# Patient Record
Sex: FEMALE | Race: WHITE | Employment: OTHER | URBAN - NONMETROPOLITAN AREA
[De-identification: names, ages, dates, MRNs, and addresses within clinical notes are randomized per-mention and may not be internally consistent; named-entity substitution may affect disease eponyms.]

---

## 2017-07-23 ENCOUNTER — HOSPITAL ENCOUNTER (EMERGENCY)
Age: 82
Discharge: HOME | End: 2017-07-23
Attending: EMERGENCY MEDICINE
Payer: MEDICARE

## 2017-07-23 VITALS
WEIGHT: 114.6 LBS | RESPIRATION RATE: 16 BRPM | OXYGEN SATURATION: 98 % | DIASTOLIC BLOOD PRESSURE: 64 MMHG | SYSTOLIC BLOOD PRESSURE: 135 MMHG | HEIGHT: 61 IN | TEMPERATURE: 98 F | HEART RATE: 84 BPM | BODY MASS INDEX: 21.64 KG/M2

## 2017-07-23 DIAGNOSIS — L50.9 URTICARIA: Primary | ICD-10-CM

## 2017-07-23 PROCEDURE — 99213 OFFICE O/P EST LOW 20 MIN: CPT | Performed by: EMERGENCY MEDICINE

## 2017-07-23 PROCEDURE — 99212 OFFICE O/P EST SF 10 MIN: CPT

## 2017-07-23 RX ORDER — METHYLPREDNISOLONE 4 MG/1
TABLET ORAL
Qty: 1 KIT | Refills: 0 | Status: SHIPPED | OUTPATIENT
Start: 2017-07-23 | End: 2017-07-29

## 2017-07-23 RX ORDER — HYDROXYZINE 50 MG/1
50 TABLET, FILM COATED ORAL 3 TIMES DAILY PRN
Qty: 15 TABLET | Refills: 0 | Status: SHIPPED | OUTPATIENT
Start: 2017-07-23 | End: 2017-08-02

## 2017-07-23 RX ORDER — ERGOCALCIFEROL 1.25 MG/1
50000 CAPSULE ORAL WEEKLY
COMMUNITY

## 2017-07-23 ASSESSMENT — ENCOUNTER SYMPTOMS
NAUSEA: 0
SORE THROAT: 0
ABDOMINAL PAIN: 0
WHEEZING: 0
EYE REDNESS: 0
COUGH: 0
EYE PAIN: 0
VOMITING: 0
DIARRHEA: 0
SHORTNESS OF BREATH: 0
EYE DISCHARGE: 0

## 2018-04-06 ENCOUNTER — HOSPITAL ENCOUNTER (EMERGENCY)
Age: 83
Discharge: HOME | End: 2018-04-06
Payer: MEDICARE

## 2018-04-06 VITALS
OXYGEN SATURATION: 96 % | DIASTOLIC BLOOD PRESSURE: 64 MMHG | SYSTOLIC BLOOD PRESSURE: 137 MMHG | BODY MASS INDEX: 21.73 KG/M2 | TEMPERATURE: 98.7 F | HEART RATE: 107 BPM | RESPIRATION RATE: 16 BRPM | WEIGHT: 115 LBS

## 2018-04-06 DIAGNOSIS — J04.0 LARYNGITIS ACUTE, SPASMODIC: ICD-10-CM

## 2018-04-06 DIAGNOSIS — H65.01 RIGHT ACUTE SEROUS OTITIS MEDIA, RECURRENCE NOT SPECIFIED: Primary | ICD-10-CM

## 2018-04-06 PROCEDURE — 99212 OFFICE O/P EST SF 10 MIN: CPT

## 2018-04-06 PROCEDURE — 99213 OFFICE O/P EST LOW 20 MIN: CPT | Performed by: NURSE PRACTITIONER

## 2018-04-06 RX ORDER — AZITHROMYCIN 250 MG/1
TABLET, FILM COATED ORAL
Qty: 6 TABLET | Refills: 0 | Status: SHIPPED | OUTPATIENT
Start: 2018-04-06 | End: 2020-11-24 | Stop reason: ALTCHOICE

## 2018-04-06 RX ORDER — METHYLPREDNISOLONE 4 MG/1
TABLET ORAL
Qty: 1 KIT | Refills: 0 | Status: SHIPPED | OUTPATIENT
Start: 2018-04-06 | End: 2018-04-12

## 2018-04-06 RX ORDER — AZELASTINE 1 MG/ML
1 SPRAY, METERED NASAL 2 TIMES DAILY
Qty: 1 BOTTLE | Refills: 0 | Status: SHIPPED | OUTPATIENT
Start: 2018-04-06 | End: 2020-11-24 | Stop reason: ALTCHOICE

## 2018-04-06 ASSESSMENT — ENCOUNTER SYMPTOMS
FLU SYMPTOMS: 1
SINUS PRESSURE: 1
SHORTNESS OF BREATH: 0
DIARRHEA: 0
CHOKING: 0
CHEST TIGHTNESS: 0
VOMITING: 0
COUGH: 1
SORE THROAT: 1
APNEA: 0
WHEEZING: 0
STRIDOR: 0
NAUSEA: 0
RHINORRHEA: 1

## 2020-11-24 ENCOUNTER — APPOINTMENT (OUTPATIENT)
Dept: GENERAL RADIOLOGY | Age: 85
End: 2020-11-24
Payer: MEDICARE

## 2020-11-24 ENCOUNTER — HOSPITAL ENCOUNTER (EMERGENCY)
Age: 85
Discharge: HOME | End: 2020-11-24
Payer: MEDICARE

## 2020-11-24 VITALS
OXYGEN SATURATION: 98 % | TEMPERATURE: 98 F | SYSTOLIC BLOOD PRESSURE: 110 MMHG | BODY MASS INDEX: 21.92 KG/M2 | HEART RATE: 89 BPM | DIASTOLIC BLOOD PRESSURE: 73 MMHG | RESPIRATION RATE: 16 BRPM | WEIGHT: 116 LBS

## 2020-11-24 LAB
ALBUMIN SERPL-MCNC: 4.5 G/DL (ref 3.5–5.1)
ALP BLD-CCNC: 54 U/L (ref 38–126)
ALT SERPL-CCNC: 13 U/L (ref 11–66)
ANION GAP SERPL CALCULATED.3IONS-SCNC: 10 MEQ/L (ref 8–16)
AST SERPL-CCNC: 23 U/L (ref 5–40)
BACTERIA: ABNORMAL
BASOPHILS # BLD: 1.3 %
BASOPHILS ABSOLUTE: 0.1 THOU/MM3 (ref 0–0.1)
BILIRUB SERPL-MCNC: 0.2 MG/DL (ref 0.3–1.2)
BILIRUBIN DIRECT: < 0.2 MG/DL (ref 0–0.3)
BILIRUBIN URINE: NEGATIVE
BLOOD, URINE: NEGATIVE
BUN BLDV-MCNC: 14 MG/DL (ref 7–22)
CALCIUM SERPL-MCNC: 10.6 MG/DL (ref 8.5–10.5)
CASTS: ABNORMAL /LPF
CASTS: ABNORMAL /LPF
CHARACTER, URINE: CLEAR
CHLORIDE BLD-SCNC: 103 MEQ/L (ref 98–111)
CO2: 25 MEQ/L (ref 23–33)
COLOR: YELLOW
CREAT SERPL-MCNC: 0.9 MG/DL (ref 0.4–1.2)
CRYSTALS: ABNORMAL
D-DIMER QUANTITATIVE: 351 NG/ML FEU (ref 0–500)
EOSINOPHIL # BLD: 2.4 %
EOSINOPHILS ABSOLUTE: 0.2 THOU/MM3 (ref 0–0.4)
EPITHELIAL CELLS, UA: ABNORMAL /HPF
ERYTHROCYTE [DISTWIDTH] IN BLOOD BY AUTOMATED COUNT: 13 % (ref 11.5–14.5)
ERYTHROCYTE [DISTWIDTH] IN BLOOD BY AUTOMATED COUNT: 46.5 FL (ref 35–45)
GFR SERPL CREATININE-BSD FRML MDRD: 59 ML/MIN/1.73M2
GLUCOSE BLD-MCNC: 103 MG/DL (ref 70–108)
GLUCOSE, URINE: NEGATIVE MG/DL
HCT VFR BLD CALC: 36 % (ref 37–47)
HEMOGLOBIN: 11.8 GM/DL (ref 12–16)
IMMATURE GRANS (ABS): 0.01 THOU/MM3 (ref 0–0.07)
IMMATURE GRANULOCYTES: 0.1 %
KETONES, URINE: NEGATIVE
LEUKOCYTE ESTERASE, URINE: ABNORMAL
LIPASE: 30.6 U/L (ref 5.6–51.3)
LYMPHOCYTES # BLD: 28.8 %
LYMPHOCYTES ABSOLUTE: 2 THOU/MM3 (ref 1–4.8)
MCH RBC QN AUTO: 31.6 PG (ref 26–33)
MCHC RBC AUTO-ENTMCNC: 32.8 GM/DL (ref 32.2–35.5)
MCV RBC AUTO: 96.5 FL (ref 81–99)
MISCELLANEOUS LAB TEST RESULT: ABNORMAL
MONOCYTES # BLD: 5.9 %
MONOCYTES ABSOLUTE: 0.4 THOU/MM3 (ref 0.4–1.3)
NITRITE, URINE: NEGATIVE
NUCLEATED RED BLOOD CELLS: 0 /100 WBC
OSMOLALITY CALCULATION: 276.4 MOSMOL/KG (ref 275–300)
PH UA: 7 (ref 5–9)
PLATELET # BLD: 154 THOU/MM3 (ref 130–400)
PMV BLD AUTO: 12.6 FL (ref 9.4–12.4)
POTASSIUM SERPL-SCNC: 4 MEQ/L (ref 3.5–5.2)
PRO-BNP: 3859 PG/ML (ref 0–1800)
PROTEIN UA: NEGATIVE MG/DL
RBC # BLD: 3.73 MILL/MM3 (ref 4.2–5.4)
RBC URINE: ABNORMAL /HPF
RENAL EPITHELIAL, UA: ABNORMAL
SARS-COV-2, NAAT: NOT DETECTED
SEG NEUTROPHILS: 61.5 %
SEGMENTED NEUTROPHILS ABSOLUTE COUNT: 4.4 THOU/MM3 (ref 1.8–7.7)
SODIUM BLD-SCNC: 138 MEQ/L (ref 135–145)
SPECIFIC GRAVITY UA: 1.01 (ref 1–1.03)
TOTAL PROTEIN: 6.5 G/DL (ref 6.1–8)
TROPONIN T: < 0.01 NG/ML
UROBILINOGEN, URINE: 0.2 EU/DL (ref 0–1)
WBC # BLD: 7.1 THOU/MM3 (ref 4.8–10.8)
WBC UA: ABNORMAL /HPF
YEAST: ABNORMAL

## 2020-11-24 PROCEDURE — 36415 COLL VENOUS BLD VENIPUNCTURE: CPT

## 2020-11-24 PROCEDURE — 93005 ELECTROCARDIOGRAM TRACING: CPT | Performed by: NURSE PRACTITIONER

## 2020-11-24 PROCEDURE — 83880 ASSAY OF NATRIURETIC PEPTIDE: CPT

## 2020-11-24 PROCEDURE — 81001 URINALYSIS AUTO W/SCOPE: CPT

## 2020-11-24 PROCEDURE — 82248 BILIRUBIN DIRECT: CPT

## 2020-11-24 PROCEDURE — 85025 COMPLETE CBC W/AUTO DIFF WBC: CPT

## 2020-11-24 PROCEDURE — 83690 ASSAY OF LIPASE: CPT

## 2020-11-24 PROCEDURE — 99213 OFFICE O/P EST LOW 20 MIN: CPT | Performed by: NURSE PRACTITIONER

## 2020-11-24 PROCEDURE — 80053 COMPREHEN METABOLIC PANEL: CPT

## 2020-11-24 PROCEDURE — 84484 ASSAY OF TROPONIN QUANT: CPT

## 2020-11-24 PROCEDURE — 99215 OFFICE O/P EST HI 40 MIN: CPT

## 2020-11-24 PROCEDURE — 99284 EMERGENCY DEPT VISIT MOD MDM: CPT

## 2020-11-24 PROCEDURE — U0002 COVID-19 LAB TEST NON-CDC: HCPCS

## 2020-11-24 PROCEDURE — 85379 FIBRIN DEGRADATION QUANT: CPT

## 2020-11-24 PROCEDURE — 71045 X-RAY EXAM CHEST 1 VIEW: CPT

## 2020-11-24 PROCEDURE — 6370000000 HC RX 637 (ALT 250 FOR IP): Performed by: NURSE PRACTITIONER

## 2020-11-24 RX ORDER — POTASSIUM CHLORIDE 20 MEQ/1
20 TABLET, EXTENDED RELEASE ORAL DAILY
Qty: 14 TABLET | Refills: 0 | Status: ON HOLD | OUTPATIENT
Start: 2020-11-24 | End: 2020-12-18 | Stop reason: SDUPTHER

## 2020-11-24 RX ORDER — ASPIRIN 81 MG/1
243 TABLET, CHEWABLE ORAL ONCE
Status: COMPLETED | OUTPATIENT
Start: 2020-11-24 | End: 2020-11-24

## 2020-11-24 RX ORDER — FUROSEMIDE 20 MG/1
20 TABLET ORAL DAILY
Qty: 14 TABLET | Refills: 0 | Status: ON HOLD | OUTPATIENT
Start: 2020-11-24 | End: 2020-12-18 | Stop reason: SDUPTHER

## 2020-11-24 RX ADMIN — ASPIRIN 243 MG: 81 TABLET, CHEWABLE ORAL at 12:44

## 2020-11-24 ASSESSMENT — ENCOUNTER SYMPTOMS
SHORTNESS OF BREATH: 1
RHINORRHEA: 0
BACK PAIN: 0
ABDOMINAL PAIN: 1
COUGH: 0
VOMITING: 0
DIARRHEA: 0
SINUS PRESSURE: 0
CONSTIPATION: 0
CHEST TIGHTNESS: 1
NAUSEA: 0
SORE THROAT: 0

## 2020-11-24 NOTE — PROGRESS NOTES
Patient released in stable condition. Instructed to go directly to [facility] emergency department for further evaluation and treatment. Patient verbalized understanding. Ambulated, per self, to private car.

## 2020-11-24 NOTE — ED PROVIDER NOTES
[facility]  Urgent Care Encounter       CHIEF COMPLAINT       Chief Complaint   Patient presents with    Shortness of Breath     dizziness    Chest Pain       Nurses Notes reviewed and I agree except as noted in the HPI. HISTORY OF PRESENT ILLNESS   Denny Osorio is a 80 y.o. female who presents for evaluation of chest tightness and shortness of breath. Patient states this is an ongoing issue for the past week. States that the symptoms seem to be worse with exertion. States that she can typically walk without difficulty but now feels somewhat short of breath and her chest will get tight after a mildly extended period of walking which is not normal for her. States that she has also noticed some swelling in the left lower extremity. Patient reports a history of hypertension and high cholesterol for which she is currently taking medications. States that she has not had a routine cardiac work-ups patient denies any cough, congestion, sore throat or any respiratory or evaluations. Does report a history of a heart murmur. Patient denies any illnesses or any known sick exposures. The history is provided by the patient. REVIEW OF SYSTEMS     Review of Systems   Constitutional: Negative for chills and fever. HENT: Negative for congestion and sore throat. Respiratory: Positive for chest tightness and shortness of breath. Negative for cough. Cardiovascular: Positive for chest pain and leg swelling. Gastrointestinal: Negative for nausea and vomiting. Musculoskeletal: Negative for arthralgias and myalgias. Skin: Negative for rash. Allergic/Immunologic: Negative for environmental allergies. Neurological: Positive for dizziness. Negative for headaches.        PAST MEDICAL HISTORY         Diagnosis Date    Hyperlipidemia     Hypertension     Osteoporosis     Pneumonia        SURGICALHISTORY     Patient  has a past surgical history that includes Elbow surgery; Tonsillectomy; and Carpal tunnel release (Right). CURRENT MEDICATIONS       Previous Medications    AMLODIPINE-BENAZEPRIL (LOTREL) 5-10 MG PER CAPSULE    Take 1 capsule by mouth daily. DICYCLOMINE (BENTYL) 10 MG CAPSULE    Take 1 capsule by mouth 4 times daily (before meals and nightly). LORATADINE (CLARITIN) 10 MG TABLET    Take 10 mg by mouth daily. LOVASTATIN (MEVACOR) 10 MG TABLET    Take 1 tablet by mouth nightly. MULTIPLE VITAMINS-MINERALS (MULTI VITAMIN/MINERALS) TABS    Take  by mouth. OMEGA-3 FATTY ACIDS (FISH OIL OMEGA-3 PO)    Take by mouth    PIROXICAM (FELDENE) 20 MG CAPSULE    Take 1 capsule by mouth daily for 90 days. UNABLE TO FIND    Indications: Osteoporosis    VITAMIN D (ERGOCALCIFEROL) 42571 UNITS CAPS CAPSULE    Take 50,000 Units by mouth once a week       ALLERGIES     Patient is is allergic to tomato. Patients   There is no immunization history on file for this patient. FAMILY HISTORY     Patient's family history is not on file. SOCIAL HISTORY     Patient  reports that she has never smoked. She has never used smokeless tobacco. She reports that she does not drink alcohol or use drugs. PHYSICAL EXAM     ED TRIAGE VITALS  BP: 122/66, Temp: 97.7 °F (36.5 °C), Pulse: 97, Resp: 18, SpO2: 98 %,Estimated body mass index is 21.92 kg/m² as calculated from the following:    Height as of 7/23/17: 5' 1\" (1.549 m). Weight as of this encounter: 116 lb (52.6 kg). ,No LMP recorded. Patient is postmenopausal.    Physical Exam  Vitals signs and nursing note reviewed. Constitutional:       General: She is not in acute distress. Appearance: She is well-developed. She is not diaphoretic. Eyes:      Conjunctiva/sclera:      Right eye: Right conjunctiva is not injected. Left eye: Left conjunctiva is not injected. Pupils: Pupils are equal.   Neck:      Musculoskeletal: Normal range of motion.    Cardiovascular:      Rate and Rhythm: Normal rate and regular rhythm. Heart sounds: Murmur present. Pulmonary:      Effort: Pulmonary effort is normal. No respiratory distress. Breath sounds: Normal breath sounds. Musculoskeletal:      Right knee: She exhibits normal range of motion. Left knee: She exhibits normal range of motion. Right lower leg: No edema. Left lower leg: No edema. Skin:     General: Skin is warm. Findings: No rash. Neurological:      Mental Status: She is alert and oriented to person, place, and time. Psychiatric:         Behavior: Behavior normal.         DIAGNOSTIC RESULTS     Labs:  Results for orders placed or performed during the hospital encounter of 11/24/20   EKG 12 Lead   Result Value Ref Range    Ventricular Rate 97 BPM    Atrial Rate 97 BPM    P-R Interval 164 ms    QRS Duration 78 ms    Q-T Interval 310 ms    QTc Calculation (Bazett) 393 ms    P Axis 58 degrees    R Axis 24 degrees    T Axis 65 degrees       IMAGING:    No orders to display         EKG:  Sinus rhythm with subtle changes noted from previous EKG performed on 1/17/2013    URGENT CARE COURSE:     Vitals:    11/24/20 1219   BP: 122/66   Pulse: 97   Resp: 18   Temp: 97.7 °F (36.5 °C)   TempSrc: Temporal   SpO2: 98%   Weight: 116 lb (52.6 kg)       Medications   aspirin chewable tablet 243 mg (has no administration in time range)            PROCEDURES:  None    FINAL IMPRESSION      1. Dyspnea on exertion    2. Chest pain, unspecified type          DISPOSITION/ PLAN       I discussed with the patient that although the symptoms have been ongoing intermittently for the past week, the fact that they seem to occur with exertion is concerning and I advised that she have a cardiac work-up performed in the emergency department. She is agreeable to plan as discussed and states that she would prefer to go to [facility]. Report was called to HELEN Pierre RN.     PATIENT REFERRED TO:  Celena Tan MD  [address] / [address] [facility]

## 2020-11-24 NOTE — ED PROVIDER NOTES
[facility]       Chief Complaint   Patient presents with    Shortness of Breath     dizziness    Chest Pain     for about a week       Nurses Notes reviewed and I agree except as noted in the HPI. HISTORY OF PRESENT ILLNESS    Diego Camarillo is a 80 y.o. female with a history of hypertension, dyslipidemia, and heart murmur who presents to the ED for evaluation of chest pain and dizziness. Patient states she first noticed the chest pain when she was outside getting the mail about a week ago. She describes it as a substernal \"pressure\" that is non-radiating and lasted for a few seconds before subsiding. She states this chest pressure would occur intermittently throughout the week with exertion. She states she has not had a pain like this before in the past. She also notes SOB with exertion and dizziness. Patient also states she has bilateral lower extremity edema but denies leg pain. She denies palpitations, diaphoresis, cough, PND, orthopnea, pain with inspiration, recent illness, bowel changes, urinary changes, nausea, vomiting, fever, or chills. Patient was seen at urgent care and was referred to the ED. HPI was provided by the patient. REVIEW OF SYSTEMS     Review of Systems   Constitutional: Negative for chills, diaphoresis and fever. HENT: Negative for congestion, ear pain, hearing loss, rhinorrhea, sinus pressure, sore throat and tinnitus. Eyes: Negative for visual disturbance. Respiratory: Positive for shortness of breath (with exertion). Negative for cough. Cardiovascular: Positive for chest pain (substernal pressure) and leg swelling (bilateral ). Negative for palpitations. Gastrointestinal: Positive for abdominal pain (RLQ). Negative for constipation, diarrhea, nausea and vomiting. Genitourinary: Negative for dysuria and frequency. Musculoskeletal: Negative for back pain and neck pain. Neurological: Positive for dizziness.  Negative for syncope, weakness and headaches. PAST MEDICAL HISTORY     Past Medical History:   Diagnosis Date    Hyperlipidemia     Hypertension     Osteoporosis     Pneumonia        SURGICALHISTORY      has a past surgical history that includes Elbow surgery; Tonsillectomy; and Carpal tunnel release (Right). CURRENT MEDICATIONS       Previous Medications    AMLODIPINE-BENAZEPRIL (LOTREL) 5-10 MG PER CAPSULE    Take 1 capsule by mouth daily. DICYCLOMINE (BENTYL) 10 MG CAPSULE    Take 1 capsule by mouth 4 times daily (before meals and nightly). LORATADINE (CLARITIN) 10 MG TABLET    Take 10 mg by mouth daily. LOVASTATIN (MEVACOR) 10 MG TABLET    Take 1 tablet by mouth nightly. MULTIPLE VITAMINS-MINERALS (MULTI VITAMIN/MINERALS) TABS    Take  by mouth. OMEGA-3 FATTY ACIDS (FISH OIL OMEGA-3 PO)    Take by mouth    PIROXICAM (FELDENE) 20 MG CAPSULE    Take 1 capsule by mouth daily for 90 days. UNABLE TO FIND    Indications: Osteoporosis    VITAMIN D (ERGOCALCIFEROL) 68246 UNITS CAPS CAPSULE    Take 50,000 Units by mouth once a week       ALLERGIES     is allergic to tomato. FAMILY HISTORY     She indicated that her mother is . She indicated that her father is . family history is not on file. SOCIAL HISTORY       Social History     Socioeconomic History    Marital status:       Spouse name: Not on file    Number of children: Not on file    Years of education: Not on file    Highest education level: Not on file   Occupational History    Not on file   Social Needs    Financial resource strain: Not on file    Food insecurity     Worry: Not on file     Inability: Not on file    Transportation needs     Medical: Not on file     Non-medical: Not on file   Tobacco Use    Smoking status: Never Smoker    Smokeless tobacco: Never Used   Substance and Sexual Activity    Alcohol use: No     Comment: rare    Drug use: No    Sexual activity: Not on file   Lifestyle  Physical activity     Days per week: Not on file     Minutes per session: Not on file    Stress: Not on file   Relationships    Social connections     Talks on phone: Not on file     Gets together: Not on file     Attends Protestant service: Not on file     Active member of club or organization: Not on file     Attends meetings of clubs or organizations: Not on file     Relationship status: Not on file    Intimate partner violence     Fear of current or ex partner: Not on file     Emotionally abused: Not on file     Physically abused: Not on file     Forced sexual activity: Not on file   Other Topics Concern    Not on file   Social History Narrative    Not on file       PHYSICAL EXAM     INITIAL VITALS:  weight is 116 lb (52.6 kg). Her oral temperature is 98 °F (36.7 °C). Her blood pressure is 134/55 (abnormal) and her pulse is 88. Her respiration is 18 and oxygen saturation is 95%. Physical Exam  Constitutional:       General: She is not in acute distress. Appearance: She is well-developed and normal weight. She is not ill-appearing or diaphoretic. Cardiovascular:      Rate and Rhythm: Normal rate and regular rhythm. Pulses: Normal pulses. Heart sounds: Murmur (blowing murmur located in the aortic region that radiates to carotids ) present. Pulmonary:      Effort: Pulmonary effort is normal. No respiratory distress. Breath sounds: Examination of the right-lower field reveals rales. Examination of the left-lower field reveals rales. Rales present. No decreased breath sounds or wheezing. Chest:      Chest wall: No tenderness. Abdominal:      General: Bowel sounds are normal.      Palpations: Abdomen is soft. There is no mass. Tenderness: There is no abdominal tenderness. Musculoskeletal: Normal range of motion. Right lower leg: She exhibits tenderness. Edema present. Left lower leg: She exhibits tenderness. Edema present.       Comments: Mild non-pitting edema L>R Skin:     General: Skin is warm and dry. Capillary Refill: Capillary refill takes less than 2 seconds. Coloration: Skin is not pale. Findings: No erythema. Comments: Varicose veins located bilateral lower extremities    Neurological:      General: No focal deficit present. Mental Status: She is alert. Motor: No weakness. DIFFERENTIAL DIAGNOSIS:   Unstable angina, CHF, PE, aortic stenosis, PNA    DIAGNOSTIC RESULTS     EKG: All EKG's are interpreted by the Emergency Department Physician who eithersigns or Co-signs this chart in the absence of a cardiologist.    EKG read and interpreted by myself with comparison to January 17, 2013 gives impression of normal sinus rhythm with heart rate of 97; interval 164; QRS 78;QTc 393; axis P-58, R-24, T-65. RADIOLOGY: non-plainfilm images(s) such as CT, Ultrasound and MRI are read by the radiologist.  Plain radiographic images are visualized and preliminarily interpreted by the emergency physician unless otherwise stated below. XR CHEST PORTABLE    (Results Pending)         LABS:   Labs Reviewed   CBC WITH AUTO DIFFERENTIAL   BASIC METABOLIC PANEL   HEPATIC FUNCTION PANEL   LIPASE   TROPONIN   BRAIN NATRIURETIC PEPTIDE   D-DIMER, QUANTITATIVE   COVID-19   URINALYSIS WITH MICROSCOPIC   COVID-19       EMERGENCY DEPARTMENT COURSE:   Vitals:    Vitals:    11/24/20 1219 11/24/20 1337   BP: 122/66 (!) 134/55   Pulse: 97 88   Resp: 18 18   Temp: 97.7 °F (36.5 °C) 98 °F (36.7 °C)   TempSrc: Temporal Oral   SpO2: 98% 95%   Weight: 116 lb (52.6 kg)        MDM    Patient was seen and evaluated in the emergency department, patient appeared to be in no acute distress, vital signs were reviewed, no significant findings were noted. Physical exam was completed, some minimal edema in the lower extremities, she had a systolic murmur, consistent with aortic stenosis.   She denies any history of echocardiogram in the past.  She denies history of cardiologist.  She denies chest pain for me. She has no significant medical histories. Labs were obtained, slight elevation in proBNP noted. Chest x-ray reveals no significant findings. Patient seems to have some congestive heart failure may be associated with her heart murmur. She is stable enough for discharge, will place her on furosemide daily, will add potassium for expected hypokalemia. Discussed the case with the on-call cardiologist Dr. Garry Maddox, he agrees to follow-up with the patient as an outpatient. Discussed this with the patient and her daughter and they are amenable with discharge. They are advised to return the emergency department new or worsening signs or symptoms. Here in the emergency department patient maintained stable course and appropriate for discharge. Medications   aspirin chewable tablet 243 mg (243 mg Oral Given 11/24/20 1244)       Patient was seenindependently by myself. The patient's final impression and disposition and plan was determined by myself. CRITICAL CARE:   None    CONSULTS:  Dr. Tami Gary:  None    FINAL IMPRESSION     1. Dyspnea on exertion    2. Chest pain, unspecified type          DISPOSITION/PLAN   Patient was discharged in stable condition    PATIENT REFERREDTO:  [facility] [address] [address] EMERGENCY DEPT  [address]  Go today        DISCHARGE MEDICATIONS:  New Prescriptions    No medications on file       (Please note that portions of this note were completed with a voice recognition program.  Efforts were made to edit the dictations but occasionally words are mis-transcribed.)      Provider:  I personally performed the services described in the documentation,reviewed and edited the documentation which was dictated to the scribe in my presence, and it accurately records my words and actions.     Samir Pope CNP 11/24/20 2:29 PM    Allegra Pope, DEBBIE - CNP       [place], APRN - CNP  11/24/20

## 2020-11-24 NOTE — ED TRIAGE NOTES
Patient to room from registration. C/o intermittent shortness of breath, chest tightness, and dizziness beginning one week ago. EKG completed. Provider notified.

## 2020-11-25 ENCOUNTER — TELEPHONE (OUTPATIENT)
Dept: CARDIOLOGY CLINIC | Age: 85
End: 2020-11-25

## 2020-11-25 LAB
EKG ATRIAL RATE: 83 BPM
EKG ATRIAL RATE: 97 BPM
EKG P AXIS: 58 DEGREES
EKG P AXIS: 59 DEGREES
EKG P-R INTERVAL: 162 MS
EKG P-R INTERVAL: 164 MS
EKG Q-T INTERVAL: 310 MS
EKG Q-T INTERVAL: 362 MS
EKG QRS DURATION: 78 MS
EKG QRS DURATION: 78 MS
EKG QTC CALCULATION (BAZETT): 393 MS
EKG QTC CALCULATION (BAZETT): 425 MS
EKG R AXIS: 14 DEGREES
EKG R AXIS: 24 DEGREES
EKG T AXIS: 44 DEGREES
EKG T AXIS: 65 DEGREES
EKG VENTRICULAR RATE: 83 BPM
EKG VENTRICULAR RATE: 97 BPM

## 2020-11-25 PROCEDURE — 93010 ELECTROCARDIOGRAM REPORT: CPT | Performed by: INTERNAL MEDICINE

## 2020-11-30 ENCOUNTER — OFFICE VISIT (OUTPATIENT)
Dept: CARDIOLOGY CLINIC | Age: 85
End: 2020-11-30
Payer: MEDICARE

## 2020-11-30 VITALS
HEIGHT: 62 IN | WEIGHT: 119.4 LBS | SYSTOLIC BLOOD PRESSURE: 138 MMHG | BODY MASS INDEX: 21.97 KG/M2 | DIASTOLIC BLOOD PRESSURE: 80 MMHG | HEART RATE: 66 BPM

## 2020-11-30 PROBLEM — R07.9 CHEST PAIN ON EXERTION: Status: ACTIVE | Noted: 2020-11-30

## 2020-11-30 PROBLEM — R01.1 SYSTOLIC EJECTION MURMUR: Status: ACTIVE | Noted: 2020-11-30

## 2020-11-30 PROBLEM — R01.1 PANSYSTOLIC MURMUR: Status: ACTIVE | Noted: 2020-11-30

## 2020-11-30 PROBLEM — I50.9 CHRONIC CONGESTIVE HEART FAILURE (HCC): Status: ACTIVE | Noted: 2020-11-30

## 2020-11-30 PROBLEM — R06.02 SOB (SHORTNESS OF BREATH) ON EXERTION: Status: ACTIVE | Noted: 2020-11-30

## 2020-11-30 PROCEDURE — 99204 OFFICE O/P NEW MOD 45 MIN: CPT | Performed by: INTERNAL MEDICINE

## 2020-11-30 RX ORDER — AMLODIPINE BESYLATE 5 MG/1
TABLET ORAL
Status: ON HOLD | COMMUNITY
Start: 2020-10-01 | End: 2020-12-30 | Stop reason: HOSPADM

## 2020-11-30 NOTE — PROGRESS NOTES
Chief Complaint   Patient presents with    New Patient    Chest Pain    Shortness of Breath    New patient for SOB and chest pain    Seen in ED and sent home with lasix  And kcl    Leg edema got better after lasix    Chest Pain   Patient complains of chest pain. For the last 2 weeks . Retrosternal, sudden in onset, Symptoms have been unchanged since that time. The patient's pain is intermittent. The patient describes the pain as pressure and does not radiate. Patient rates pain as a 5/10 in intensity. Associated symptoms are: dyspnea. Aggravating factors are: exercise and walking. Alleviating factors are: rest.   CP last 5 min    Sob on exertion olast 2 weeks    She started to walk less    Walking to mail box and return give her cp and sob    Normally active and doing yard work    Dizziness on getting up    Never smoked    Known to have murmur for yrs    FHX  None for CAD      Patient Active Problem List   Diagnosis    HTN (hypertension)    Hyperlipidemia    Osteoporosis    Allergic rhinitis    IBS (irritable bowel syndrome)    Osteoarthrosis involving multiple sites    Medication monitoring encounter    Postmenopausal bone loss    Anemia    Right hand weakness, atrophy of thumb muscle.  Chest pressure  on exertion    SOB (shortness of breath) on exertion    Chronic congestive heart failure (HCC)    Systolic ejection murmur 5/6 best aortic area    Pansystolic murmur- best tricuspid area 5/6       Past Surgical History:   Procedure Laterality Date    CARPAL TUNNEL RELEASE Right     ELBOW SURGERY      2013 right elbow    TONSILLECTOMY         Allergies   Allergen Reactions    Tomato Hives        History reviewed. No pertinent family history. Social History     Socioeconomic History    Marital status:       Spouse name: Not on file    Number of children: Not on file    Years of education: Not on file    Highest education level: Not on file   Occupational History    Not on file Social Needs    Financial resource strain: Not on file    Food insecurity     Worry: Not on file     Inability: Not on file    Transportation needs     Medical: Not on file     Non-medical: Not on file   Tobacco Use    Smoking status: Never Smoker    Smokeless tobacco: Never Used   Substance and Sexual Activity    Alcohol use: No     Comment: rare    Drug use: No    Sexual activity: Not on file   Lifestyle    Physical activity     Days per week: Not on file     Minutes per session: Not on file    Stress: Not on file   Relationships    Social connections     Talks on phone: Not on file     Gets together: Not on file     Attends Mandaen service: Not on file     Active member of club or organization: Not on file     Attends meetings of clubs or organizations: Not on file     Relationship status: Not on file    Intimate partner violence     Fear of current or ex partner: Not on file     Emotionally abused: Not on file     Physically abused: Not on file     Forced sexual activity: Not on file   Other Topics Concern    Not on file   Social History Narrative    Not on file       Current Outpatient Medications   Medication Sig Dispense Refill    amLODIPine (NORVASC) 5 MG tablet       furosemide (LASIX) 20 MG tablet Take 1 tablet by mouth daily 14 tablet 0    potassium chloride (KLOR-CON M) 20 MEQ extended release tablet Take 1 tablet by mouth daily 14 tablet 0    UNABLE TO FIND Indications: Osteoporosis      Omega-3 Fatty Acids (FISH OIL OMEGA-3 PO) Take by mouth      vitamin D (ERGOCALCIFEROL) 84289 units CAPS capsule Take 50,000 Units by mouth once a week      Multiple Vitamins-Minerals (MULTI VITAMIN/MINERALS) TABS Take  by mouth.  lovastatin (MEVACOR) 10 MG tablet Take 1 tablet by mouth nightly. 90 tablet 3    dicyclomine (BENTYL) 10 MG capsule Take 1 capsule by mouth 4 times daily (before meals and nightly). 360 capsule 3    loratadine (CLARITIN) 10 MG tablet Take 10 mg by mouth daily.  piroxicam (FELDENE) 20 MG capsule Take 1 capsule by mouth daily for 90 days. 30 capsule 3     No current facility-administered medications for this visit. Review of Systems -     General ROS: negative  Psychological ROS: negative  Hematological and Lymphatic ROS: No history of blood clots or bleeding disorder. Respiratory ROS: no cough,  or wheezing, the rest see HPI  Cardiovascular ROS: See HPI  Gastrointestinal ROS: negative  Genito-Urinary ROS: no dysuria, trouble voiding, or hematuria  Musculoskeletal ROS: negative  Neurological ROS: no TIA or stroke symptoms  Dermatological ROS: negative      Blood pressure 138/80, pulse 66, height 5' 2\" (1.575 m), weight 119 lb 6.4 oz (54.2 kg). Physical Examination:    General appearance - alert, well appearing, and in no distress  HEENT- Pink conjunctiva  , Non-icteri sclera,PERRLA  Mental status - alert, oriented to person, place, and time  Neck - supple, no significant adenopathy, no JVD, or carotid bruits  Chest - clear to auscultation, no wheezes, rales or rhonchi, symmetric air entry  Heart - normal rate, regular rhythm, normal S1, S2, no murmurs, rubs, clicks or gallops  Abdomen - soft, nontender, nondistended, no masses or organomegaly  HODAN- no CVA or flank tenderness, no suprapubic tenderness  Neurological - alert, oriented, normal speech, no focal findings or movement disorder noted  Musculoskeletal/limbs - no joint tenderness, deformity or swelling   - peripheral pulses normal, no pedal edema, no clubbing or cyanosis  Skin - normal coloration and turgor, no rashes, no suspicious skin lesions noted  Psych- appropriate mood and affect    Lab  No results for input(s): CKTOTAL, CKMB, CKMBINDEX, TROPONINI in the last 72 hours.   CBC:   Lab Results   Component Value Date    WBC 7.1 11/24/2020    RBC 3.73 11/24/2020    HGB 11.8 11/24/2020    HCT 36.0 11/24/2020    MCV 96.5 11/24/2020    MCH 31.6 11/24/2020    MCHC 32.8 11/24/2020    RDW 13.0 symptoms got worse and/or develop new symptoms. The ED record reviewed    Congestive heart failure: no evidence of fluid overload today, no recent hospitalization for CHF  Cont lasix and kcl  BMP and mg  Leg edema resolved    significant murmurs  Echo    Cp and sob on exertion  Echo  lexisc nuc after echo      Hypertension, on medical treatment. Seems to be under good control. Patient is compliant with medical treatment. Hyperlipidemia: on statins, followed periodically. Patient need periodic lipid and liver profile.      BMP and MG    D/w the pat the plan of care    RTC in 2 weeks        [facility]

## 2020-12-02 ENCOUNTER — HOSPITAL ENCOUNTER (OUTPATIENT)
Dept: NON INVASIVE DIAGNOSTICS | Age: 85
Discharge: HOME | End: 2020-12-02
Payer: MEDICARE

## 2020-12-02 LAB
LV EF: 43 %
LVEF MODALITY: NORMAL

## 2020-12-02 PROCEDURE — 93306 TTE W/DOPPLER COMPLETE: CPT

## 2020-12-08 ENCOUNTER — TELEPHONE (OUTPATIENT)
Dept: CARDIOLOGY CLINIC | Age: 85
End: 2020-12-08

## 2020-12-08 NOTE — TELEPHONE ENCOUNTER
Echo completed. Does pt need stress test?    Summary   Left ventricle size is normal.   Mildly increased left ventricle wall thickness. Mild concentric left ventricular hypertrophy. There was moderate global hypokinesis of the left ventricle. Systolic function was moderately reduced. Ejection fraction is visually estimated in the range of 40% to 45%. Doppler parameters were consistent with abnormal left ventricular   relaxation (grade 1 diastolic dysfunction). The left atrium is Mildly dilated. There is severe/critical/ aortic stenosis with valve area of 0.5 to 0.6 sq   cm. The maximum aortic valve gradient is 125 mmHg, the mean gradient is 84   mmHg, and the peak velocity is 5.6 m/s.

## 2020-12-10 NOTE — PATIENT INSTRUCTIONS
You may receive a survey regarding the care you received during your visit. Your input is valuable to us. We encourage you to complete and return your survey. We hope you will choose us in the future for your healthcare needs.
Yes

## 2020-12-11 ENCOUNTER — OFFICE VISIT (OUTPATIENT)
Dept: CARDIOLOGY CLINIC | Age: 85
End: 2020-12-11
Payer: MEDICARE

## 2020-12-11 ENCOUNTER — HOSPITAL ENCOUNTER (OUTPATIENT)
Age: 85
Discharge: HOME | End: 2020-12-11
Payer: MEDICARE

## 2020-12-11 VITALS
BODY MASS INDEX: 22.38 KG/M2 | DIASTOLIC BLOOD PRESSURE: 64 MMHG | SYSTOLIC BLOOD PRESSURE: 123 MMHG | HEIGHT: 62 IN | WEIGHT: 121.6 LBS | HEART RATE: 94 BPM

## 2020-12-11 PROBLEM — I42.9 CARDIOMYOPATHY (HCC): Status: ACTIVE | Noted: 2020-12-11

## 2020-12-11 PROBLEM — I35.0 SEVERE CALCIFIC AORTIC STENOSIS: Status: ACTIVE | Noted: 2020-12-11

## 2020-12-11 PROCEDURE — U0003 INFECTIOUS AGENT DETECTION BY NUCLEIC ACID (DNA OR RNA); SEVERE ACUTE RESPIRATORY SYNDROME CORONAVIRUS 2 (SARS-COV-2) (CORONAVIRUS DISEASE [COVID-19]), AMPLIFIED PROBE TECHNIQUE, MAKING USE OF HIGH THROUGHPUT TECHNOLOGIES AS DESCRIBED BY CMS-2020-01-R: HCPCS

## 2020-12-11 PROCEDURE — 99215 OFFICE O/P EST HI 40 MIN: CPT | Performed by: INTERNAL MEDICINE

## 2020-12-11 NOTE — PROGRESS NOTES
Chief Complaint   Patient presents with    Follow-up     Echo fu     Chest Pain        Pt here to go over the echo. Seen in ED and sent home with lasix  And kcl 3 week back  With referal to cardiology    Complains of palpitation intermittent  On exertion    Sob and chest pressure on exertion in the last 4 weeks  Feel fatigue  Kline Median was active prior     Leg edema got better after lasix    Chest Pain   Patient complains of chest pain. For the last 4 weeks . Retrosternal, sudden in onset, Symptoms have been unchanged since that time. The patient's pain is intermittent. The patient describes the pain as pressure and does not radiate. Patient rates pain as a 5/10 in intensity. Associated symptoms are: dyspnea. Aggravating factors are: exercise and walking. Alleviating factors are: rest.   CP last 5 min    Sob on exertion 4  weeks    She started to walk less    Walking to mail box and return give her cp and sob    Normally active and doing yard work    Dizziness on getting up in the last 4 weeks    Never smoked    Known to have murmur for yrs    [address] [facility]  None for CAD      Patient Active Problem List   Diagnosis    HTN (hypertension)    Hyperlipidemia    Osteoporosis    Allergic rhinitis    IBS (irritable bowel syndrome)    Osteoarthrosis involving multiple sites    Medication monitoring encounter    Postmenopausal bone loss    Anemia    Right hand weakness, atrophy of thumb muscle.  Chest pressure  on exertion    SOB (shortness of breath) on exertion    Chronic congestive heart failure (HCC)    Systolic ejection murmur 5/6 best aortic area    Pansystolic murmur- best tricuspid area 5/6    Severe calcific aortic stenosis    Cardiomyopathy [facility])       Past Surgical History:   Procedure Laterality Date    CARPAL TUNNEL RELEASE Right     ELBOW SURGERY      2013 right elbow    TONSILLECTOMY         Allergies   Allergen Reactions    Tomato Hives        History reviewed. No pertinent family history. Social History     Socioeconomic History    Marital status:      Spouse name: Not on file    Number of children: Not on file    Years of education: Not on file    Highest education level: Not on file   Occupational History    Not on file   Social Needs    Financial resource strain: Not on file    Food insecurity     Worry: Not on file     Inability: Not on file    Transportation needs     Medical: Not on file     Non-medical: Not on file   Tobacco Use    Smoking status: Never Smoker    Smokeless tobacco: Never Used   Substance and Sexual Activity    Alcohol use: No     Comment: rare    Drug use: No    Sexual activity: Not on file   Lifestyle    Physical activity     Days per week: Not on file     Minutes per session: Not on file    Stress: Not on file   Relationships    Social connections     Talks on phone: Not on file     Gets together: Not on file     Attends Zoroastrianism service: Not on file     Active member of club or organization: Not on file     Attends meetings of clubs or organizations: Not on file     Relationship status: Not on file    Intimate partner violence     Fear of current or ex partner: Not on file     Emotionally abused: Not on file     Physically abused: Not on file     Forced sexual activity: Not on file   Other Topics Concern    Not on file   Social History Narrative    Not on file       Current Outpatient Medications   Medication Sig Dispense Refill    amLODIPine (NORVASC) 5 MG tablet       furosemide (LASIX) 20 MG tablet Take 1 tablet by mouth daily 14 tablet 0    potassium chloride (KLOR-CON M) 20 MEQ extended release tablet Take 1 tablet by mouth daily 14 tablet 0    UNABLE TO FIND Indications: Osteoporosis      Omega-3 Fatty Acids (FISH OIL OMEGA-3 PO) Take by mouth      vitamin D (ERGOCALCIFEROL) 90534 units CAPS capsule Take 50,000 Units by mouth once a week      Multiple Vitamins-Minerals (MULTI VITAMIN/MINERALS) TABS Take  by mouth.         lovastatin (MEVACOR) 10 MG tablet Take 1 tablet by mouth nightly. 90 tablet 3    dicyclomine (BENTYL) 10 MG capsule Take 1 capsule by mouth 4 times daily (before meals and nightly). 360 capsule 3    loratadine (CLARITIN) 10 MG tablet Take 10 mg by mouth daily.  piroxicam (FELDENE) 20 MG capsule Take 1 capsule by mouth daily for 90 days. 30 capsule 3     No current facility-administered medications for this visit. Review of Systems -     General ROS: negative  Psychological ROS: negative  Hematological and Lymphatic ROS: No history of blood clots or bleeding disorder. Respiratory ROS: no cough,  or wheezing, the rest see HPI  Cardiovascular ROS: See HPI  Gastrointestinal ROS: negative  Genito-Urinary ROS: no dysuria, trouble voiding, or hematuria  Musculoskeletal ROS: negative  Neurological ROS: no TIA or stroke symptoms  Dermatological ROS: negative      Blood pressure 123/64, pulse 116, height 5' 2\" (1.575 m), weight 121 lb 9.6 oz (55.2 kg).         Physical Examination:    General appearance - alert, well appearing, and in no distress  HEENT- Pink conjunctiva  , Non-icteri sclera,PERRLA  Mental status - alert, oriented to person, place, and time  Neck - supple, no significant adenopathy, no JVD, or carotid bruits  Chest - clear to auscultation, no wheezes, rales or rhonchi, symmetric air entry  Heart - normal rate, regular rhythm, normal S1, S2, no murmurs, rubs, clicks or gallops  Abdomen - soft, nontender, nondistended, no masses or organomegaly  HODAN- no CVA or flank tenderness, no suprapubic tenderness  Neurological - alert, oriented, normal speech, no focal findings or movement disorder noted  Musculoskeletal/limbs - no joint tenderness, deformity or swelling   - peripheral pulses normal, no pedal edema, no clubbing or cyanosis  Skin - normal coloration and turgor, no rashes, no suspicious skin lesions noted  Psych- appropriate mood and affect    Lab  No results for input(s): CKTOTAL, CKMB, CKMBINDEX, TROPONINI in the last 72 hours. CBC:   Lab Results   Component Value Date    WBC 7.1 11/24/2020    RBC 3.73 11/24/2020    HGB 11.8 11/24/2020    HCT 36.0 11/24/2020    MCV 96.5 11/24/2020    MCH 31.6 11/24/2020    MCHC 32.8 11/24/2020    RDW 13.0 01/17/2013     11/24/2020    MPV 12.6 11/24/2020     BMP:    Lab Results   Component Value Date     11/24/2020    K 4.0 11/24/2020     11/24/2020    CO2 25 11/24/2020    BUN 14 11/24/2020    LABALBU 4.5 11/24/2020    CREATININE 0.9 11/24/2020    CALCIUM 10.6 11/24/2020    LABGLOM 59 11/24/2020    GLUCOSE 103 11/24/2020     Hepatic Function Panel:    Lab Results   Component Value Date    ALKPHOS 54 11/24/2020    ALT 13 11/24/2020    AST 23 11/24/2020    PROT 6.5 11/24/2020    BILITOT 0.2 11/24/2020    BILIDIR <0.2 11/24/2020    LABALBU 4.5 11/24/2020     Magnesium:  No results found for: MG  Warfarin PT/INR:  No components found for: PTPATWAR, PTINRWAR  HgBA1c:  No results found for: LABA1C  FLP:    Lab Results   Component Value Date    TRIG 56 01/17/2013    HDL 91 01/17/2013    HDL 68 01/11/2012    LDLCALC 106 01/17/2013     TSH:  No results found for: TSH    ekg  Normal 11/24/2020sinus rhythm  Possible Left atrial enlargement  LVH  Nonspecific T wave abnormality  Abnormal ECG  When compared with ECG of 24-NOV-2020 12:12,  No significant change was found     Conclusions      Summary   Left ventricle size is normal.   Mildly increased left ventricle wall thickness. Mild concentric left ventricular hypertrophy. There was moderate global hypokinesis of the left ventricle. Systolic function was moderately reduced. Ejection fraction is visually estimated in the range of 40% to 45%. Doppler parameters were consistent with abnormal left ventricular   relaxation (grade 1 diastolic dysfunction). The left atrium is Mildly dilated. There is severe/critical/ aortic stenosis with valve area of 0.5 to 0.6 sq   cm.    The maximum aortic valve gradient is 125 mmHg, the mean gradient is 84   mmHg, and the peak velocity is 5.6 m/s. Signature    ----------------------------------------------------------------   Electronically signed by Poonam Jane MD (Interpreting   physician) on 12/02/2020 at 05:08 PM      Assessment       Diagnosis Orders   1. Severe calcific aortic stenosis     2. SOB (shortness of breath) on exertion     3. Chest pressure  on exertion     4. Cardiomyopathy, unspecified type ([address]     5. Chronic combined systolic and diastolic congestive heart failure (HCC)     6. Pure hypercholesterolemia     7. Essential hypertension     8. Systolic ejection murmur 5/6 best aortic area           Plan     Continue the current treatment and with constant vigilance to changes in symptoms and also any potential side effects. Return for care or seek medical attention immediately if symptoms got worse and/or develop new symptoms. The ED record reviewed    Congestive heart failure: no evidence of fluid overload today, no recent hospitalization for CHF  Off  lasix and kcl for the last 5 days and still stable  Leg edema  Trace    Severe /critical AORTIC STENOSIS  significant murmurs  Cp and sob on exertion  Echo- ABN  lexisc nuc  WAS TO BE DONE after echo- CANCELLED DUE TO SEVER AS  NEED DONE ASAP   NEED VALVE REPLACEMENT- SAVR VERSUS TAVR  NEED CATH  NEED CATH AND AVR ASAP  D/W THE PAT AT LENGTH    The risk and benefit of left heart cath has been explain in detail including but not limited to  Bleeding including retroperitoneal bleed 1%, infection, MI, CVA, BRENDA, Limb loss, dissection, allergic reaction,death  Each of them 1 in 2000 range. The alternative managment has been explained. Patient expressed understanding of the risk and benefit and of the alternative managment well. Patient wanted and agreed to proceed with left heart cath. Hence we will schedule hER for [facility]       Cardiomyopathy: improving, no CHF symptoms, no change in clinical condition.  Will need periodic echocardiograms depending on symptoms. Hypertension, on medical treatment. Seems to be under good control. Patient is compliant with medical treatment. Hyperlipidemia: on statins, followed periodically. Patient need periodic lipid and liver profile.     D/w the elsy the plan of care    RTC in 3 weeks        [facility]

## 2020-12-13 LAB — SARS-COV-2: NOT DETECTED

## 2020-12-16 LAB
ANION GAP SERPL CALCULATED.3IONS-SCNC: 9 MMOL/L (ref 5–15)
BUN BLDV-MCNC: 16 MG/DL (ref 5–27)
CALCIUM SERPL-MCNC: 9.9 MG/DL (ref 8.5–10.5)
CHLORIDE BLD-SCNC: 107 MMOL/L (ref 98–109)
CO2: 26 MMOL/L (ref 22–32)
CREAT SERPL-MCNC: 0.93 MG/DL (ref 0.4–1)
EGFR AFRICAN AMERICAN: >60 ML/MIN/1.73SQ.M
EGFR IF NONAFRICAN AMERICAN: 57 ML/MIN/1.73SQ.M
GLUCOSE: 93 MG/DL (ref 65–99)
MAGNESIUM: 1.8 MG/DL (ref 1.8–2.6)
POTASSIUM SERPL-SCNC: 4 MMOL/L (ref 3.5–5)
SODIUM BLD-SCNC: 142 MMOL/L (ref 134–146)

## 2020-12-17 ENCOUNTER — PREP FOR PROCEDURE (OUTPATIENT)
Dept: CARDIOLOGY | Age: 85
End: 2020-12-17

## 2020-12-17 RX ORDER — SODIUM CHLORIDE 0.9 % (FLUSH) 0.9 %
10 SYRINGE (ML) INJECTION EVERY 12 HOURS SCHEDULED
Status: CANCELLED | OUTPATIENT
Start: 2020-12-17

## 2020-12-17 RX ORDER — SODIUM CHLORIDE 0.9 % (FLUSH) 0.9 %
10 SYRINGE (ML) INJECTION PRN
Status: CANCELLED | OUTPATIENT
Start: 2020-12-17

## 2020-12-17 RX ORDER — ASPIRIN 325 MG
325 TABLET ORAL ONCE
Status: CANCELLED | OUTPATIENT
Start: 2020-12-17 | End: 2020-12-17

## 2020-12-17 RX ORDER — SODIUM CHLORIDE 9 MG/ML
INJECTION, SOLUTION INTRAVENOUS CONTINUOUS
Status: CANCELLED | OUTPATIENT
Start: 2020-12-17

## 2020-12-18 ENCOUNTER — APPOINTMENT (OUTPATIENT)
Dept: CT IMAGING | Age: 85
DRG: 266 | End: 2020-12-18
Attending: INTERNAL MEDICINE
Payer: MEDICARE

## 2020-12-18 ENCOUNTER — HOSPITAL ENCOUNTER (INPATIENT)
Dept: INPATIENT UNIT | Age: 85
LOS: 6 days | Discharge: NURSING, REHAB OR LONG-TERM CARE | DRG: 266 | End: 2020-12-24
Attending: INTERNAL MEDICINE | Admitting: INTERNAL MEDICINE
Payer: MEDICARE

## 2020-12-18 ENCOUNTER — APPOINTMENT (OUTPATIENT)
Dept: GENERAL RADIOLOGY | Age: 85
DRG: 266 | End: 2020-12-18
Attending: INTERNAL MEDICINE
Payer: MEDICARE

## 2020-12-18 PROBLEM — Z98.890 S/P CARDIAC CATH: Status: ACTIVE | Noted: 2020-12-18

## 2020-12-18 PROBLEM — Z98.890 S/P RIGHT HEART CATHETERIZATION: Status: ACTIVE | Noted: 2020-12-18

## 2020-12-18 PROBLEM — Z98.890 S/P RIGHT HEART CATHETERIZATION: Status: RESOLVED | Noted: 2020-12-18 | Resolved: 2020-12-18

## 2020-12-18 PROBLEM — I46.9 CARDIAC ARREST (HCC): Status: ACTIVE | Noted: 2020-12-18

## 2020-12-18 PROBLEM — Z98.890 S/P RIGHT AND LEFT HEART CATHETERIZATION: Status: ACTIVE | Noted: 2020-12-18

## 2020-12-18 LAB
ABO: NORMAL
ACTIVATED CLOTTING TIME: 153 SECONDS (ref 1–150)
ANION GAP SERPL CALCULATED.3IONS-SCNC: 10 MEQ/L (ref 8–16)
ANTIBODY SCREEN: NORMAL
APTT: 28.3 SECONDS (ref 22–38)
BUN BLDV-MCNC: 13 MG/DL (ref 7–22)
CALCIUM SERPL-MCNC: 10.5 MG/DL (ref 8.5–10.5)
CHLORIDE BLD-SCNC: 101 MEQ/L (ref 98–111)
CO2: 24 MEQ/L (ref 23–33)
COLLECTED BY:: ABNORMAL
CREAT SERPL-MCNC: 0.9 MG/DL (ref 0.4–1.2)
EKG ATRIAL RATE: 89 BPM
EKG P AXIS: 51 DEGREES
EKG P-R INTERVAL: 168 MS
EKG Q-T INTERVAL: 360 MS
EKG QRS DURATION: 82 MS
EKG QTC CALCULATION (BAZETT): 438 MS
EKG R AXIS: 8 DEGREES
EKG T AXIS: 73 DEGREES
EKG VENTRICULAR RATE: 89 BPM
ERYTHROCYTE [DISTWIDTH] IN BLOOD BY AUTOMATED COUNT: 13.2 % (ref 11.5–14.5)
ERYTHROCYTE [DISTWIDTH] IN BLOOD BY AUTOMATED COUNT: 45.4 FL (ref 35–45)
GFR SERPL CREATININE-BSD FRML MDRD: 59 ML/MIN/1.73M2
GLUCOSE BLD-MCNC: 113 MG/DL (ref 70–108)
HCT VFR BLD CALC: 35 % (ref 37–47)
HEMOGLOBIN: 11.5 GM/DL (ref 12–16)
INR BLD: 0.99 (ref 0.85–1.13)
MCH RBC QN AUTO: 31.3 PG (ref 26–33)
MCHC RBC AUTO-ENTMCNC: 32.9 GM/DL (ref 32.2–35.5)
MCV RBC AUTO: 95.1 FL (ref 81–99)
PLATELET # BLD: 161 THOU/MM3 (ref 130–400)
PMV BLD AUTO: 12.2 FL (ref 9.4–12.4)
POC O2 SATURATION: 61 % (ref 94–97)
POC O2 SATURATION: 61 % (ref 94–97)
POC O2 SATURATION: 62 % (ref 94–97)
POC O2 SATURATION: 93 % (ref 94–97)
POTASSIUM REFLEX MAGNESIUM: 4.1 MEQ/L (ref 3.5–5.2)
RBC # BLD: 3.68 MILL/MM3 (ref 4.2–5.4)
RH FACTOR: NORMAL
SODIUM BLD-SCNC: 135 MEQ/L (ref 135–145)
SOURCE, BLOOD GAS: ABNORMAL
WBC # BLD: 8 THOU/MM3 (ref 4.8–10.8)

## 2020-12-18 PROCEDURE — U0002 COVID-19 LAB TEST NON-CDC: HCPCS

## 2020-12-18 PROCEDURE — 5A1935Z RESPIRATORY VENTILATION, LESS THAN 24 CONSECUTIVE HOURS: ICD-10-PCS | Performed by: INTERNAL MEDICINE

## 2020-12-18 PROCEDURE — 6360000002 HC RX W HCPCS

## 2020-12-18 PROCEDURE — 83935 ASSAY OF URINE OSMOLALITY: CPT

## 2020-12-18 PROCEDURE — C1769 GUIDE WIRE: HCPCS

## 2020-12-18 PROCEDURE — 36415 COLL VENOUS BLD VENIPUNCTURE: CPT

## 2020-12-18 PROCEDURE — 2500000003 HC RX 250 WO HCPCS

## 2020-12-18 PROCEDURE — 0BH18EZ INSERTION OF ENDOTRACHEAL AIRWAY INTO TRACHEA, VIA NATURAL OR ARTIFICIAL OPENING ENDOSCOPIC: ICD-10-PCS | Performed by: INTERNAL MEDICINE

## 2020-12-18 PROCEDURE — C1887 CATHETER, GUIDING: HCPCS

## 2020-12-18 PROCEDURE — 87486 CHLMYD PNEUM DNA AMP PROBE: CPT

## 2020-12-18 PROCEDURE — 80076 HEPATIC FUNCTION PANEL: CPT

## 2020-12-18 PROCEDURE — 84100 ASSAY OF PHOSPHORUS: CPT

## 2020-12-18 PROCEDURE — 87500 VANOMYCIN DNA AMP PROBE: CPT

## 2020-12-18 PROCEDURE — 81003 URINALYSIS AUTO W/O SCOPE: CPT

## 2020-12-18 PROCEDURE — 87081 CULTURE SCREEN ONLY: CPT

## 2020-12-18 PROCEDURE — 85347 COAGULATION TIME ACTIVATED: CPT

## 2020-12-18 PROCEDURE — 85027 COMPLETE CBC AUTOMATED: CPT

## 2020-12-18 PROCEDURE — 93010 ELECTROCARDIOGRAM REPORT: CPT | Performed by: INTERNAL MEDICINE

## 2020-12-18 PROCEDURE — 2709999900 HC NON-CHARGEABLE SUPPLY

## 2020-12-18 PROCEDURE — 85610 PROTHROMBIN TIME: CPT

## 2020-12-18 PROCEDURE — 93456 R HRT CORONARY ARTERY ANGIO: CPT | Performed by: INTERNAL MEDICINE

## 2020-12-18 PROCEDURE — 86900 BLOOD TYPING SEROLOGIC ABO: CPT

## 2020-12-18 PROCEDURE — 93005 ELECTROCARDIOGRAM TRACING: CPT | Performed by: NURSE PRACTITIONER

## 2020-12-18 PROCEDURE — 87641 MR-STAPH DNA AMP PROBE: CPT

## 2020-12-18 PROCEDURE — 87541 LEGION PNEUMO DNA AMP PROB: CPT

## 2020-12-18 PROCEDURE — 2500000003 HC RX 250 WO HCPCS: Performed by: NURSE PRACTITIONER

## 2020-12-18 PROCEDURE — 85025 COMPLETE CBC W/AUTO DIFF WBC: CPT

## 2020-12-18 PROCEDURE — 6360000004 HC RX CONTRAST MEDICATION: Performed by: INTERNAL MEDICINE

## 2020-12-18 PROCEDURE — 71045 X-RAY EXAM CHEST 1 VIEW: CPT

## 2020-12-18 PROCEDURE — 85730 THROMBOPLASTIN TIME PARTIAL: CPT

## 2020-12-18 PROCEDURE — B2111ZZ FLUOROSCOPY OF MULTIPLE CORONARY ARTERIES USING LOW OSMOLAR CONTRAST: ICD-10-PCS | Performed by: INTERNAL MEDICINE

## 2020-12-18 PROCEDURE — 82810 BLOOD GASES O2 SAT ONLY: CPT

## 2020-12-18 PROCEDURE — 87631 RESP VIRUS 3-5 TARGETS: CPT

## 2020-12-18 PROCEDURE — 2580000003 HC RX 258: Performed by: PHYSICIAN ASSISTANT

## 2020-12-18 PROCEDURE — 70450 CT HEAD/BRAIN W/O DYE: CPT

## 2020-12-18 PROCEDURE — C1894 INTRO/SHEATH, NON-LASER: HCPCS

## 2020-12-18 PROCEDURE — 87581 M.PNEUMON DNA AMP PROBE: CPT

## 2020-12-18 PROCEDURE — 6360000002 HC RX W HCPCS: Performed by: NURSE PRACTITIONER

## 2020-12-18 PROCEDURE — 74174 CTA ABD&PLVS W/CONTRAST: CPT

## 2020-12-18 PROCEDURE — 71275 CT ANGIOGRAPHY CHEST: CPT

## 2020-12-18 PROCEDURE — 2000000000 HC ICU R&B

## 2020-12-18 PROCEDURE — 93005 ELECTROCARDIOGRAM TRACING: CPT | Performed by: PHYSICIAN ASSISTANT

## 2020-12-18 PROCEDURE — 94002 VENT MGMT INPAT INIT DAY: CPT

## 2020-12-18 PROCEDURE — 87086 URINE CULTURE/COLONY COUNT: CPT

## 2020-12-18 PROCEDURE — 99223 1ST HOSP IP/OBS HIGH 75: CPT | Performed by: INTERNAL MEDICINE

## 2020-12-18 PROCEDURE — C1725 CATH, TRANSLUMIN NON-LASER: HCPCS

## 2020-12-18 PROCEDURE — 86901 BLOOD TYPING SEROLOGIC RH(D): CPT

## 2020-12-18 PROCEDURE — 86850 RBC ANTIBODY SCREEN: CPT

## 2020-12-18 PROCEDURE — 4A023N8 MEASUREMENT OF CARDIAC SAMPLING AND PRESSURE, BILATERAL, PERCUTANEOUS APPROACH: ICD-10-PCS | Performed by: INTERNAL MEDICINE

## 2020-12-18 PROCEDURE — 84300 ASSAY OF URINE SODIUM: CPT

## 2020-12-18 PROCEDURE — 87798 DETECT AGENT NOS DNA AMP: CPT

## 2020-12-18 PROCEDURE — 80048 BASIC METABOLIC PNL TOTAL CA: CPT

## 2020-12-18 PROCEDURE — 82330 ASSAY OF CALCIUM: CPT

## 2020-12-18 RX ORDER — SODIUM CHLORIDE 0.9 % (FLUSH) 0.9 %
10 SYRINGE (ML) INJECTION PRN
Status: DISCONTINUED | OUTPATIENT
Start: 2020-12-18 | End: 2020-12-18 | Stop reason: SDUPTHER

## 2020-12-18 RX ORDER — ONDANSETRON 2 MG/ML
4 INJECTION INTRAMUSCULAR; INTRAVENOUS EVERY 6 HOURS PRN
Status: DISCONTINUED | OUTPATIENT
Start: 2020-12-18 | End: 2020-12-18 | Stop reason: SDUPTHER

## 2020-12-18 RX ORDER — SODIUM CHLORIDE 0.9 % (FLUSH) 0.9 %
10 SYRINGE (ML) INJECTION EVERY 12 HOURS SCHEDULED
Status: DISCONTINUED | OUTPATIENT
Start: 2020-12-18 | End: 2020-12-24 | Stop reason: HOSPADM

## 2020-12-18 RX ORDER — PROMETHAZINE HYDROCHLORIDE 25 MG/1
12.5 TABLET ORAL EVERY 6 HOURS PRN
Status: DISCONTINUED | OUTPATIENT
Start: 2020-12-18 | End: 2020-12-24 | Stop reason: HOSPADM

## 2020-12-18 RX ORDER — MIDAZOLAM HYDROCHLORIDE 2 MG/2ML
2 INJECTION, SOLUTION INTRAMUSCULAR; INTRAVENOUS ONCE
Status: COMPLETED | OUTPATIENT
Start: 2020-12-18 | End: 2020-12-18

## 2020-12-18 RX ORDER — ACETAMINOPHEN 325 MG/1
650 TABLET ORAL EVERY 6 HOURS PRN
Status: DISCONTINUED | OUTPATIENT
Start: 2020-12-18 | End: 2020-12-18

## 2020-12-18 RX ORDER — SODIUM CHLORIDE 9 MG/ML
INJECTION, SOLUTION INTRAVENOUS CONTINUOUS
Status: DISCONTINUED | OUTPATIENT
Start: 2020-12-18 | End: 2020-12-19

## 2020-12-18 RX ORDER — EPINEPHRINE 0.1 MG/ML
SYRINGE (ML) INJECTION DAILY PRN
Status: COMPLETED | OUTPATIENT
Start: 2020-12-18 | End: 2020-12-18

## 2020-12-18 RX ORDER — SODIUM CHLORIDE 0.9 % (FLUSH) 0.9 %
10 SYRINGE (ML) INJECTION PRN
Status: DISCONTINUED | OUTPATIENT
Start: 2020-12-18 | End: 2020-12-24 | Stop reason: HOSPADM

## 2020-12-18 RX ORDER — CHLORHEXIDINE GLUCONATE 0.12 MG/ML
15 RINSE ORAL 2 TIMES DAILY
Status: DISCONTINUED | OUTPATIENT
Start: 2020-12-18 | End: 2020-12-19

## 2020-12-18 RX ORDER — POLYETHYLENE GLYCOL 3350 17 G/17G
17 POWDER, FOR SOLUTION ORAL DAILY PRN
Status: DISCONTINUED | OUTPATIENT
Start: 2020-12-18 | End: 2020-12-24 | Stop reason: HOSPADM

## 2020-12-18 RX ORDER — MIDAZOLAM HYDROCHLORIDE 1 MG/ML
INJECTION INTRAMUSCULAR; INTRAVENOUS
Status: COMPLETED | OUTPATIENT
Start: 2020-12-18 | End: 2020-12-18

## 2020-12-18 RX ORDER — ASPIRIN 325 MG
325 TABLET ORAL ONCE
Status: DISCONTINUED | OUTPATIENT
Start: 2020-12-18 | End: 2020-12-24 | Stop reason: HOSPADM

## 2020-12-18 RX ORDER — SODIUM CHLORIDE 0.9 % (FLUSH) 0.9 %
10 SYRINGE (ML) INJECTION EVERY 12 HOURS SCHEDULED
Status: DISCONTINUED | OUTPATIENT
Start: 2020-12-18 | End: 2020-12-18 | Stop reason: SDUPTHER

## 2020-12-18 RX ORDER — PROPOFOL 10 MG/ML
10 INJECTION, EMULSION INTRAVENOUS
Status: DISCONTINUED | OUTPATIENT
Start: 2020-12-18 | End: 2020-12-20 | Stop reason: ALTCHOICE

## 2020-12-18 RX ORDER — ACETAMINOPHEN 325 MG/1
650 TABLET ORAL EVERY 4 HOURS PRN
Status: DISCONTINUED | OUTPATIENT
Start: 2020-12-18 | End: 2020-12-24 | Stop reason: HOSPADM

## 2020-12-18 RX ORDER — ACETAMINOPHEN 650 MG/1
650 SUPPOSITORY RECTAL EVERY 6 HOURS PRN
Status: DISCONTINUED | OUTPATIENT
Start: 2020-12-18 | End: 2020-12-18

## 2020-12-18 RX ORDER — ONDANSETRON 2 MG/ML
4 INJECTION INTRAMUSCULAR; INTRAVENOUS EVERY 6 HOURS PRN
Status: DISCONTINUED | OUTPATIENT
Start: 2020-12-18 | End: 2020-12-24 | Stop reason: HOSPADM

## 2020-12-18 RX ORDER — ETOMIDATE 2 MG/ML
INJECTION INTRAVENOUS
Status: COMPLETED | OUTPATIENT
Start: 2020-12-18 | End: 2020-12-18

## 2020-12-18 RX ORDER — POTASSIUM CHLORIDE 750 MG/1
10 TABLET, EXTENDED RELEASE ORAL DAILY
Qty: 10 TABLET | Refills: 0 | Status: ON HOLD | OUTPATIENT
Start: 2020-12-18 | End: 2020-12-30 | Stop reason: HOSPADM

## 2020-12-18 RX ORDER — ASPIRIN 81 MG/1
81 TABLET ORAL DAILY
COMMUNITY

## 2020-12-18 RX ORDER — FENTANYL CITRATE 50 UG/ML
25 INJECTION, SOLUTION INTRAMUSCULAR; INTRAVENOUS
Status: DISCONTINUED | OUTPATIENT
Start: 2020-12-18 | End: 2020-12-19

## 2020-12-18 RX ORDER — FUROSEMIDE 20 MG/1
10 TABLET ORAL DAILY
Qty: 10 TABLET | Refills: 0 | Status: ON HOLD | OUTPATIENT
Start: 2020-12-18 | End: 2020-12-30 | Stop reason: HOSPADM

## 2020-12-18 RX ADMIN — PROPOFOL 10 MCG/KG/MIN: 10 INJECTION, EMULSION INTRAVENOUS at 23:00

## 2020-12-18 RX ADMIN — ETOMIDATE 20 MG: 2 INJECTION INTRAVENOUS at 22:41

## 2020-12-18 RX ADMIN — MIDAZOLAM 2 MG: 1 INJECTION INTRAMUSCULAR; INTRAVENOUS at 22:42

## 2020-12-18 RX ADMIN — MIDAZOLAM 2 MG: 1 INJECTION INTRAMUSCULAR; INTRAVENOUS at 22:44

## 2020-12-18 RX ADMIN — MIDAZOLAM HYDROCHLORIDE 2 MG: 1 INJECTION, SOLUTION INTRAMUSCULAR; INTRAVENOUS at 23:48

## 2020-12-18 RX ADMIN — SODIUM CHLORIDE: 9 INJECTION, SOLUTION INTRAVENOUS at 13:57

## 2020-12-18 RX ADMIN — ONDANSETRON HYDROCHLORIDE 4 MG: 4 INJECTION, SOLUTION INTRAMUSCULAR; INTRAVENOUS at 22:27

## 2020-12-18 RX ADMIN — IOPAMIDOL 90 ML: 755 INJECTION, SOLUTION INTRAVENOUS at 18:12

## 2020-12-18 RX ADMIN — Medication 1 MG: at 22:20

## 2020-12-18 ASSESSMENT — PULMONARY FUNCTION TESTS: PIF_VALUE: 21

## 2020-12-18 ASSESSMENT — PAIN SCALES - GENERAL: PAINLEVEL_OUTOF10: 0

## 2020-12-18 NOTE — FLOWSHEET NOTE
Patient admitted to 2E12 via [facility] for heart cath  Patient NPO. Eva Hogan Vital signs obtained. Assessment and data collection intiated. Oriented to room. Policies and procedures for 2E explained. All questions answered with no further questions at this time. Fall prevention and safety precautions discussed with patient. Explained patients right to have family, representative or physician notified of their admission.   Patient has declined

## 2020-12-18 NOTE — PRE SEDATION
[facility]  Sedation/Analgesia History & Physical    Pt Name: Alena Santana  MRN: [id number]  YOB: 1931  Provider Performing Procedure: Jessica Colon  Primary Care Physician: Mally Gonzalez MD    PRE-PROCEDURE   DNR-CCA/DNR-CC []Yes [x]No  Brief History/Pre-Procedure Diagnosis:   Chest pain and sob on exertion CCS class III  Severe aortic stenosis need TAVR  Pre tavr cath and as well as symptomatic too    Need RHC and [facility]    The risk and benefit of left heart cath and RHC has been explain in detail including but not limited to  Bleeding including retroperitoneal bleed 1%, infection, MI, CVA, BRENDA, Limb loss, dissection, allergic reaction,death  Each of them 1 in 2000 range. The alternative managment has been explained. Patient expressed understanding of the risk and benefit and of the alternative managment well. Patient wanted and agreed to proceed with left heart cath. Hence we will schedule her for [facility]              MEDICAL HISTORY  []CAD/Valve  []Liver Disease  []Lung Disease []Diabetes  []Hypertension []Renal Disease  []Additional information:       has a past medical history of Hyperlipidemia, Hypertension, Osteoporosis, and Pneumonia. SURGICAL HISTORY   has a past surgical history that includes Elbow surgery; Tonsillectomy; and Carpal tunnel release (Right).   Additional information:       ALLERGIES   Allergies as of 12/18/2020 - Review Complete 12/18/2020   Allergen Reaction Noted    Tomato Hives 07/23/2017     Additional information:       MEDICATIONS   Coumadin Use Last 5 Days [x]No []Yes  Antiplatelet drug therapy use last 5 days  []No [x]Yes  Other anticoagulant use last 5 days  [x]No []Yes    Current Facility-Administered Medications:     0.9 % sodium chloride infusion, , Intravenous, Continuous, Avel Beckett PA-C    aspirin tablet 325 mg, 325 mg, Oral, Once, Avel Beckett PA-C   sodium chloride flush 0.9 % injection 10 mL, 10 mL, Intravenous, PRN, Zayra Goodson PA-C  Prior to Admission medications    Medication Sig Start Date End Date Taking? Authorizing Provider   Multiple Vitamins-Minerals (OCUVITE PO) Take 1 tablet by mouth daily   Yes Historical Provider, MD   Ferrous Sulfate (IRON PO) Take 1 tablet by mouth daily   Yes Historical Provider, MD   aspirin 81 MG EC tablet Take 81 mg by mouth daily   Yes Historical Provider, MD   ZINC PO Take 1 tablet by mouth daily   Yes Historical Provider, MD   amLODIPine (NORVASC) 5 MG tablet  10/1/20  Yes Historical Provider, MD   furosemide (LASIX) 20 MG tablet Take 1 tablet by mouth daily 11/24/20  Yes Samir Counts, APRN - CNP   potassium chloride (KLOR-CON M) 20 MEQ extended release tablet Take 1 tablet by mouth daily 11/24/20  Yes Samir Counts, APRN - CNP   Omega-3 Fatty Acids (FISH OIL OMEGA-3 PO) Take by mouth   Yes Historical Provider, MD   vitamin D (ERGOCALCIFEROL) 26933 units CAPS capsule Take 50,000 Units by mouth once a week   Yes Historical Provider, MD   Multiple Vitamins-Minerals (MULTI VITAMIN/MINERALS) TABS Take  by mouth. Yes Historical Provider, MD   lovastatin (MEVACOR) 10 MG tablet Take 1 tablet by mouth nightly. 1/31/12  Yes Tiny Hollis MD   dicyclomine (BENTYL) 10 MG capsule Take 1 capsule by mouth 4 times daily (before meals and nightly). 1/31/12  Yes Tiny Hollis MD   loratadine (CLARITIN) 10 MG tablet Take 10 mg by mouth daily. Yes Historical Provider, MD   piroxicam (FELDENE) 20 MG capsule Take 1 capsule by mouth daily for 90 days. 4/30/12 7/29/12  Tiny Hollis MD     Additional information:       VITAL SIGNS   There were no vitals filed for this visit.     PHYSICAL:   Heart:  [x]Regular rate and rhythm  []Other:    Lungs:  [x]Clear    []Other:    Abdomen: [x]Soft    []Other:    Mental Status: [x]Alert & Oriented  []Other:      PLANNED PROCEDURE   [x]Cath  []PCI                []Pacemaker/AICD

## 2020-12-18 NOTE — FLOWSHEET NOTE
Received from cath lab. Right groin (venous) stable. Right wrist(arterial) stable. vascband and armboard cont. Right brachial vein site stable. Pt aware to keep right leg still and not to lift head or cross legs. 0.9 normal saline cont. Denies pain or needs. Resting with easy resp.

## 2020-12-19 ENCOUNTER — APPOINTMENT (OUTPATIENT)
Dept: GENERAL RADIOLOGY | Age: 85
DRG: 266 | End: 2020-12-19
Attending: INTERNAL MEDICINE
Payer: MEDICARE

## 2020-12-19 PROBLEM — E43 SEVERE MALNUTRITION (HCC): Status: ACTIVE | Noted: 2020-12-19

## 2020-12-19 PROBLEM — E44.0 MODERATE MALNUTRITION (HCC): Status: ACTIVE | Noted: 2020-12-19

## 2020-12-19 LAB
ACINETOBACTER CALCOACETICUS-BAUMANNII BY PCR: NOT DETECTED
ACTIVATED CLOTTING TIME: 213 SECONDS (ref 1–150)
ACTIVATED CLOTTING TIME: 230 SECONDS (ref 1–150)
ADENOVIRUS BY PCR: NOT DETECTED
ALBUMIN SERPL-MCNC: 2.8 G/DL (ref 3.5–5.1)
ALP BLD-CCNC: 62 U/L (ref 38–126)
ALT SERPL-CCNC: 16 U/L (ref 11–66)
ANION GAP SERPL CALCULATED.3IONS-SCNC: 10 MEQ/L (ref 8–16)
ANION GAP SERPL CALCULATED.3IONS-SCNC: 12 MEQ/L (ref 8–16)
ANION GAP SERPL CALCULATED.3IONS-SCNC: 9 MEQ/L (ref 8–16)
APTT: 26.1 SECONDS (ref 22–38)
AST SERPL-CCNC: 80 U/L (ref 5–40)
BASOPHILS # BLD: 0.7 %
BASOPHILS ABSOLUTE: 0.1 THOU/MM3 (ref 0–0.1)
BILIRUB SERPL-MCNC: 0.4 MG/DL (ref 0.3–1.2)
BILIRUBIN DIRECT: < 0.2 MG/DL (ref 0–0.3)
BILIRUBIN URINE: NEGATIVE
BLOOD, URINE: NEGATIVE
BUN BLDV-MCNC: 11 MG/DL (ref 7–22)
BUN BLDV-MCNC: 12 MG/DL (ref 7–22)
BUN BLDV-MCNC: 12 MG/DL (ref 7–22)
CALCIUM IONIZED: 1.17 MMOL/L (ref 1.12–1.32)
CALCIUM SERPL-MCNC: 8.6 MG/DL (ref 8.5–10.5)
CALCIUM SERPL-MCNC: 8.9 MG/DL (ref 8.5–10.5)
CALCIUM SERPL-MCNC: 9.1 MG/DL (ref 8.5–10.5)
CHARACTER, URINE: CLEAR
CHLAMYDIA PNEUMONIAE BY PCR: NOT DETECTED
CHLORIDE BLD-SCNC: 107 MEQ/L (ref 98–111)
CHLORIDE BLD-SCNC: 107 MEQ/L (ref 98–111)
CHLORIDE BLD-SCNC: 96 MEQ/L (ref 98–111)
CO2: 21 MEQ/L (ref 23–33)
CO2: 22 MEQ/L (ref 23–33)
CO2: 22 MEQ/L (ref 23–33)
COLOR: YELLOW
CORONAVIRUS PCR: NOT DETECTED
CREAT SERPL-MCNC: 0.7 MG/DL (ref 0.4–1.2)
CREAT SERPL-MCNC: 0.8 MG/DL (ref 0.4–1.2)
CREAT SERPL-MCNC: 0.8 MG/DL (ref 0.4–1.2)
ENTEROBACTER CLOACAE COMPLEX BY PCR: NOT DETECTED
EOSINOPHIL # BLD: 1.9 %
EOSINOPHILS ABSOLUTE: 0.2 THOU/MM3 (ref 0–0.4)
ERYTHROCYTE [DISTWIDTH] IN BLOOD BY AUTOMATED COUNT: 13.2 % (ref 11.5–14.5)
ERYTHROCYTE [DISTWIDTH] IN BLOOD BY AUTOMATED COUNT: 13.2 % (ref 11.5–14.5)
ERYTHROCYTE [DISTWIDTH] IN BLOOD BY AUTOMATED COUNT: 44.3 FL (ref 35–45)
ERYTHROCYTE [DISTWIDTH] IN BLOOD BY AUTOMATED COUNT: 47.6 FL (ref 35–45)
ESCHERICHIA COLI BY PCR: NOT DETECTED
FIBRIN SPLIT PRODUCTS: > 20 MCG/ML
FIBRINOGEN: 277 MG/100ML (ref 155–475)
GFR SERPL CREATININE-BSD FRML MDRD: 67 ML/MIN/1.73M2
GFR SERPL CREATININE-BSD FRML MDRD: 67 ML/MIN/1.73M2
GFR SERPL CREATININE-BSD FRML MDRD: 79 ML/MIN/1.73M2
GLUCOSE BLD-MCNC: 112 MG/DL (ref 70–108)
GLUCOSE BLD-MCNC: 135 MG/DL (ref 70–108)
GLUCOSE BLD-MCNC: 94 MG/DL (ref 70–108)
GLUCOSE, URINE: NEGATIVE MG/DL
HAEMOPHILUS INFLUENZAE BY PCR: NOT DETECTED
HCT VFR BLD CALC: 31.4 % (ref 37–47)
HCT VFR BLD CALC: 34.3 % (ref 37–47)
HEMOGLOBIN: 10.7 GM/DL (ref 12–16)
HEMOGLOBIN: 11.3 GM/DL (ref 12–16)
IMMATURE GRANS (ABS): 0.08 THOU/MM3 (ref 0–0.07)
IMMATURE GRANULOCYTES: 0.8 %
INFLUENZA A BY PCR: NOT DETECTED
INFLUENZA B BY PCR: NOT DETECTED
INR BLD: 1.06 (ref 0.85–1.13)
KETONES, URINE: ABNORMAL
KLEBSIELLA AEROGENES BY PCR: NOT DETECTED
KLEBSIELLA OXYTOCA BY PCR: NOT DETECTED
KLEBSIELLA PNEUMONIAE GROUP BY PCR: NOT DETECTED
LEGIONELLA PNEUMOPHILIA BY PCR: NOT DETECTED
LEUKOCYTE EST, POC: NEGATIVE
LYMPHOCYTES # BLD: 14.4 %
LYMPHOCYTES ABSOLUTE: 1.4 THOU/MM3 (ref 1–4.8)
MCH RBC QN AUTO: 31.1 PG (ref 26–33)
MCH RBC QN AUTO: 32.6 PG (ref 26–33)
MCHC RBC AUTO-ENTMCNC: 32.9 GM/DL (ref 32.2–35.5)
MCHC RBC AUTO-ENTMCNC: 34.1 GM/DL (ref 32.2–35.5)
MCV RBC AUTO: 91.3 FL (ref 81–99)
MCV RBC AUTO: 98.8 FL (ref 81–99)
METAPNEUMOVIRUS BY PCR: NOT DETECTED
MONOCYTES # BLD: 7.4 %
MONOCYTES ABSOLUTE: 0.7 THOU/MM3 (ref 0.4–1.3)
MORAXELLA CATARRHALIS BY PCR: NOT DETECTED
MRSA SCREEN RT-PCR: NEGATIVE
MYCOPLASMA PNEUMONIAE BY PCR: NOT DETECTED
NITRITE, URINE: NEGATIVE
NUCLEATED RED BLOOD CELLS: 0 /100 WBC
OSMOLALITY URINE: 212 MOSMOL/KG (ref 250–750)
OSMOLALITY: 292 MOSMOL/KG (ref 275–295)
PARAINFLUENZA VIRUS BY PCR: NOT DETECTED
PH UA: 7 (ref 5–9)
PHOSPHORUS: 3.4 MG/DL (ref 2.4–4.7)
PLATELET # BLD: 111 THOU/MM3 (ref 130–400)
PLATELET # BLD: 126 THOU/MM3 (ref 130–400)
PMV BLD AUTO: 12.5 FL (ref 9.4–12.4)
PMV BLD AUTO: 13.5 FL (ref 9.4–12.4)
POTASSIUM REFLEX MAGNESIUM: 3.8 MEQ/L (ref 3.5–5.2)
POTASSIUM SERPL-SCNC: 3.5 MEQ/L (ref 3.5–5.2)
POTASSIUM SERPL-SCNC: 4 MEQ/L (ref 3.5–5.2)
PRO-BNP: 8639 PG/ML (ref 0–1800)
PROCALCITONIN: 0.14 NG/ML (ref 0.01–0.09)
PROTEIN UA: NEGATIVE MG/DL
PROTEUS SPECIES BY PCR: NOT DETECTED
PSEUDOMONAS AERUGINOSA BY PCR: NOT DETECTED
RBC # BLD: 3.44 MILL/MM3 (ref 4.2–5.4)
RBC # BLD: 3.47 MILL/MM3 (ref 4.2–5.4)
RESISTANT GENE CTX-M BY PCR: NORMAL
RESISTANT GENE IMP BY PCR: NORMAL
RESISTANT GENE KPC BY PCR: NORMAL
RESISTANT GENE MECA/C & MREJ BY PCR: NORMAL
RESISTANT GENE NDM BY PCR: NORMAL
RESISTANT GENE OXA-48-LIKE BY PCR: NORMAL
RESISTANT GENE VIM BY PCR: NORMAL
RESPIRATORY SYNCYTIAL VIRUS BY PCR: NOT DETECTED
RHINOVIRUS ENTEROVIRUS PCR: NOT DETECTED
SARS-COV-2, NAAT: NOT DETECTED
SEG NEUTROPHILS: 74.8 %
SEGMENTED NEUTROPHILS ABSOLUTE COUNT: 7.5 THOU/MM3 (ref 1.8–7.7)
SERRATIA MARCESCENS BY PCR: NOT DETECTED
SODIUM BLD-SCNC: 127 MEQ/L (ref 135–145)
SODIUM BLD-SCNC: 138 MEQ/L (ref 135–145)
SODIUM BLD-SCNC: 141 MEQ/L (ref 135–145)
SODIUM URINE: 21 MEQ/L
SOURCE: NORMAL
SPECIFIC GRAVITY UA: 1.01 (ref 1–1.03)
SPECIMEN ACCEPTABILITY: NORMAL
STAPH AUREUS BY PCR: NOT DETECTED
STREP AGALACTIAE BY PCR: NOT DETECTED
STREP PNEUMONIAE BY PCR: NOT DETECTED
STREP PYOGENES BY PCR: NOT DETECTED
TOTAL PROTEIN: 5.3 G/DL (ref 6.1–8)
TSH SERPL DL<=0.05 MIU/L-ACNC: 2.21 UIU/ML (ref 0.4–4.2)
UROBILINOGEN, URINE: 0.2 EU/DL (ref 0–1)
VANCOMYCIN RESISTANT ENTEROCOCCUS: NEGATIVE
WBC # BLD: 10 THOU/MM3 (ref 4.8–10.8)
WBC # BLD: 9.1 THOU/MM3 (ref 4.8–10.8)

## 2020-12-19 PROCEDURE — C1760 CLOSURE DEV, VASC: HCPCS

## 2020-12-19 PROCEDURE — 94761 N-INVAS EAR/PLS OXIMETRY MLT: CPT

## 2020-12-19 PROCEDURE — 02RF38Z REPLACEMENT OF AORTIC VALVE WITH ZOOPLASTIC TISSUE, PERCUTANEOUS APPROACH: ICD-10-PCS | Performed by: INTERNAL MEDICINE

## 2020-12-19 PROCEDURE — 2580000003 HC RX 258: Performed by: INTERNAL MEDICINE

## 2020-12-19 PROCEDURE — 6370000000 HC RX 637 (ALT 250 FOR IP)

## 2020-12-19 PROCEDURE — 6360000002 HC RX W HCPCS: Performed by: NURSE PRACTITIONER

## 2020-12-19 PROCEDURE — 2709999900 HC NON-CHARGEABLE SUPPLY

## 2020-12-19 PROCEDURE — 87077 CULTURE AEROBIC IDENTIFY: CPT

## 2020-12-19 PROCEDURE — 85347 COAGULATION TIME ACTIVATED: CPT

## 2020-12-19 PROCEDURE — 6370000000 HC RX 637 (ALT 250 FOR IP): Performed by: INTERNAL MEDICINE

## 2020-12-19 PROCEDURE — 83930 ASSAY OF BLOOD OSMOLALITY: CPT

## 2020-12-19 PROCEDURE — 80048 BASIC METABOLIC PNL TOTAL CA: CPT

## 2020-12-19 PROCEDURE — 6360000002 HC RX W HCPCS: Performed by: INTERNAL MEDICINE

## 2020-12-19 PROCEDURE — 6370000000 HC RX 637 (ALT 250 FOR IP): Performed by: NURSE PRACTITIONER

## 2020-12-19 PROCEDURE — 2000000000 HC ICU R&B

## 2020-12-19 PROCEDURE — 2580000003 HC RX 258: Performed by: NURSE PRACTITIONER

## 2020-12-19 PROCEDURE — 6360000002 HC RX W HCPCS

## 2020-12-19 PROCEDURE — 36415 COLL VENOUS BLD VENIPUNCTURE: CPT

## 2020-12-19 PROCEDURE — 2780000006 HC MISC HEART VALVE

## 2020-12-19 PROCEDURE — C1725 CATH, TRANSLUMIN NON-LASER: HCPCS

## 2020-12-19 PROCEDURE — 71045 X-RAY EXAM CHEST 1 VIEW: CPT

## 2020-12-19 PROCEDURE — 2500000003 HC RX 250 WO HCPCS

## 2020-12-19 PROCEDURE — 6360000004 HC RX CONTRAST MEDICATION: Performed by: INTERNAL MEDICINE

## 2020-12-19 PROCEDURE — 33361 REPLACE AORTIC VALVE PERQ: CPT | Performed by: INTERNAL MEDICINE

## 2020-12-19 PROCEDURE — 85362 FIBRIN DEGRADATION PRODUCTS: CPT

## 2020-12-19 PROCEDURE — C1894 INTRO/SHEATH, NON-LASER: HCPCS

## 2020-12-19 PROCEDURE — 83880 ASSAY OF NATRIURETIC PEPTIDE: CPT

## 2020-12-19 PROCEDURE — 2500000003 HC RX 250 WO HCPCS: Performed by: NURSE PRACTITIONER

## 2020-12-19 PROCEDURE — 85385 FIBRINOGEN ANTIGEN: CPT

## 2020-12-19 PROCEDURE — C1769 GUIDE WIRE: HCPCS

## 2020-12-19 PROCEDURE — 87205 SMEAR GRAM STAIN: CPT

## 2020-12-19 PROCEDURE — 93005 ELECTROCARDIOGRAM TRACING: CPT | Performed by: INTERNAL MEDICINE

## 2020-12-19 PROCEDURE — 87070 CULTURE OTHR SPECIMN AEROBIC: CPT

## 2020-12-19 PROCEDURE — 84145 PROCALCITONIN (PCT): CPT

## 2020-12-19 PROCEDURE — 94003 VENT MGMT INPAT SUBQ DAY: CPT

## 2020-12-19 PROCEDURE — 84443 ASSAY THYROID STIM HORMONE: CPT

## 2020-12-19 PROCEDURE — 2720000010 HC SURG SUPPLY STERILE

## 2020-12-19 PROCEDURE — APPSS60 APP SPLIT SHARED TIME 46-60 MINUTES: Performed by: PHYSICIAN ASSISTANT

## 2020-12-19 PROCEDURE — 85027 COMPLETE CBC AUTOMATED: CPT

## 2020-12-19 PROCEDURE — 33361 REPLACE AORTIC VALVE PERQ: CPT | Performed by: THORACIC SURGERY (CARDIOTHORACIC VASCULAR SURGERY)

## 2020-12-19 PROCEDURE — 99291 CRITICAL CARE FIRST HOUR: CPT | Performed by: INTERNAL MEDICINE

## 2020-12-19 RX ORDER — ONDANSETRON 2 MG/ML
INJECTION INTRAMUSCULAR; INTRAVENOUS DAILY PRN
Status: COMPLETED | OUTPATIENT
Start: 2020-12-18 | End: 2020-12-18

## 2020-12-19 RX ORDER — SODIUM CHLORIDE 9 MG/ML
INJECTION, SOLUTION INTRAVENOUS CONTINUOUS
Status: DISCONTINUED | OUTPATIENT
Start: 2020-12-19 | End: 2020-12-19

## 2020-12-19 RX ORDER — FUROSEMIDE 10 MG/ML
40 INJECTION INTRAMUSCULAR; INTRAVENOUS ONCE
Status: COMPLETED | OUTPATIENT
Start: 2020-12-19 | End: 2020-12-19

## 2020-12-19 RX ORDER — SODIUM CHLORIDE 0.9 % (FLUSH) 0.9 %
10 SYRINGE (ML) INJECTION EVERY 12 HOURS SCHEDULED
Status: DISCONTINUED | OUTPATIENT
Start: 2020-12-19 | End: 2020-12-19 | Stop reason: SDUPTHER

## 2020-12-19 RX ORDER — CEFAZOLIN SODIUM 1 G/50ML
1 INJECTION, SOLUTION INTRAVENOUS EVERY 8 HOURS
Status: COMPLETED | OUTPATIENT
Start: 2020-12-19 | End: 2020-12-20

## 2020-12-19 RX ORDER — ATROPINE SULFATE 0.4 MG/ML
0.5 AMPUL (ML) INJECTION
Status: ACTIVE | OUTPATIENT
Start: 2020-12-19 | End: 2020-12-19

## 2020-12-19 RX ORDER — ACETAMINOPHEN 325 MG/1
650 TABLET ORAL EVERY 4 HOURS PRN
Status: DISCONTINUED | OUTPATIENT
Start: 2020-12-19 | End: 2020-12-19 | Stop reason: SDUPTHER

## 2020-12-19 RX ORDER — FUROSEMIDE 10 MG/ML
20 INJECTION INTRAMUSCULAR; INTRAVENOUS ONCE
Status: COMPLETED | OUTPATIENT
Start: 2020-12-19 | End: 2020-12-19

## 2020-12-19 RX ORDER — HYDRALAZINE HYDROCHLORIDE 20 MG/ML
10 INJECTION INTRAMUSCULAR; INTRAVENOUS EVERY 10 MIN PRN
Status: DISCONTINUED | OUTPATIENT
Start: 2020-12-19 | End: 2020-12-24 | Stop reason: HOSPADM

## 2020-12-19 RX ORDER — ONDANSETRON 2 MG/ML
4 INJECTION INTRAMUSCULAR; INTRAVENOUS EVERY 6 HOURS PRN
Status: DISCONTINUED | OUTPATIENT
Start: 2020-12-19 | End: 2020-12-19 | Stop reason: SDUPTHER

## 2020-12-19 RX ORDER — MIDAZOLAM HYDROCHLORIDE 2 MG/2ML
1 INJECTION, SOLUTION INTRAMUSCULAR; INTRAVENOUS EVERY 6 HOURS PRN
Status: DISCONTINUED | OUTPATIENT
Start: 2020-12-19 | End: 2020-12-23 | Stop reason: ALTCHOICE

## 2020-12-19 RX ORDER — ASPIRIN 81 MG/1
81 TABLET ORAL DAILY
Status: DISCONTINUED | OUTPATIENT
Start: 2020-12-20 | End: 2020-12-24 | Stop reason: HOSPADM

## 2020-12-19 RX ORDER — 0.9 % SODIUM CHLORIDE 0.9 %
250 INTRAVENOUS SOLUTION INTRAVENOUS ONCE
Status: DISCONTINUED | OUTPATIENT
Start: 2020-12-19 | End: 2020-12-19

## 2020-12-19 RX ORDER — CLOPIDOGREL BISULFATE 75 MG/1
75 TABLET ORAL DAILY
Status: DISCONTINUED | OUTPATIENT
Start: 2020-12-20 | End: 2020-12-24 | Stop reason: HOSPADM

## 2020-12-19 RX ORDER — FENTANYL CITRATE 50 UG/ML
50 INJECTION, SOLUTION INTRAMUSCULAR; INTRAVENOUS
Status: DISCONTINUED | OUTPATIENT
Start: 2020-12-19 | End: 2020-12-23 | Stop reason: ALTCHOICE

## 2020-12-19 RX ORDER — SODIUM CHLORIDE 0.9 % (FLUSH) 0.9 %
10 SYRINGE (ML) INJECTION PRN
Status: DISCONTINUED | OUTPATIENT
Start: 2020-12-19 | End: 2020-12-19 | Stop reason: SDUPTHER

## 2020-12-19 RX ADMIN — CEFAZOLIN SODIUM 1 G: 1 INJECTION, SOLUTION INTRAVENOUS at 14:02

## 2020-12-19 RX ADMIN — ACETAMINOPHEN 650 MG: 325 TABLET ORAL at 21:13

## 2020-12-19 RX ADMIN — METOPROLOL TARTRATE 25 MG: 25 TABLET ORAL at 21:10

## 2020-12-19 RX ADMIN — PHENYLEPHRINE HYDROCHLORIDE 100 MCG/MIN: 10 INJECTION INTRAVENOUS at 13:06

## 2020-12-19 RX ADMIN — Medication 10 ML: at 08:03

## 2020-12-19 RX ADMIN — IOPAMIDOL 15 ML: 755 INJECTION, SOLUTION INTRAVENOUS at 12:13

## 2020-12-19 RX ADMIN — Medication 15 ML: at 02:00

## 2020-12-19 RX ADMIN — Medication 15 ML: at 08:02

## 2020-12-19 RX ADMIN — SODIUM CHLORIDE 250 ML: 9 INJECTION, SOLUTION INTRAVENOUS at 03:15

## 2020-12-19 RX ADMIN — CEFAZOLIN SODIUM 1 G: 1 INJECTION, SOLUTION INTRAVENOUS at 21:52

## 2020-12-19 RX ADMIN — SODIUM CHLORIDE: 9 INJECTION, SOLUTION INTRAVENOUS at 15:54

## 2020-12-19 RX ADMIN — FUROSEMIDE 20 MG: 10 INJECTION, SOLUTION INTRAMUSCULAR; INTRAVENOUS at 04:38

## 2020-12-19 RX ADMIN — SODIUM CHLORIDE: 9 INJECTION, SOLUTION INTRAVENOUS at 01:39

## 2020-12-19 RX ADMIN — FAMOTIDINE 20 MG: 10 INJECTION INTRAVENOUS at 08:02

## 2020-12-19 RX ADMIN — IOPAMIDOL 125 ML: 755 INJECTION, SOLUTION INTRAVENOUS at 01:07

## 2020-12-19 RX ADMIN — FUROSEMIDE 40 MG: 10 INJECTION, SOLUTION INTRAMUSCULAR; INTRAVENOUS at 23:36

## 2020-12-19 ASSESSMENT — PULMONARY FUNCTION TESTS
PIF_VALUE: 21
PIF_VALUE: 21
PIF_VALUE: 16
PIF_VALUE: 21

## 2020-12-19 ASSESSMENT — PAIN SCALES - GENERAL
PAINLEVEL_OUTOF10: 0
PAINLEVEL_OUTOF10: 0

## 2020-12-19 ASSESSMENT — PAIN DESCRIPTION - PROGRESSION: CLINICAL_PROGRESSION: NOT CHANGED

## 2020-12-19 ASSESSMENT — PAIN - FUNCTIONAL ASSESSMENT: PAIN_FUNCTIONAL_ASSESSMENT: ACTIVITIES ARE NOT PREVENTED

## 2020-12-19 NOTE — PROGRESS NOTES
· Usual Body Weight: (Per EMR: 121 lb 9.6 oz on 12/11/20; 119 lb 6.4 oz on 11/30/20)    · Ideal Body Weight: 110 lbs  · BMI: 16.65  · BMI Categories: Underweight (BMI less than 22) age over 72       Nutrition Diagnosis:   · Moderate malnutrition, In context of acute illness or injury related to inadequate protein-energy intake(suspect inadequate oral intake) as evidenced by mild muscle loss(-24% weight loss in 3 weeks)    Nutrition Interventions:   Food and/or Nutrient Delivery:  (Recommend enteral feeding when able to use gut)  Nutrition Education/Counseling:  Education not indicated   Coordination of Nutrition Care:  Continue to monitor while inpatient    Goals:  Patient will receive adequate nutrition within 1-4 days. Nutrition Monitoring and Evaluation:   Behavioral-Environmental Outcomes:  None Identified   Food/Nutrient Intake Outcomes:  (NPO status)  Physical Signs/Symptoms Outcomes:  Biochemical Data, Chewing or Swallowing, GI Status, Weight, Skin, Nutrition Focused Physical Findings, Fluid Status or Edema     Discharge Planning:     Too soon to determine     Electronically signed by Hilda Carey RD, ANGELITA on 12/19/20 at 1:10 PM EST    Contact: [phone]

## 2020-12-19 NOTE — PROGRESS NOTES
Pat with recently Dxed critically severe aortic valve stenosis  After presentation with sob and chest pain  Cath was done today for pre-TAVR and for chest pain   Over 4 hours post was informed by the nurse attending the pat that pat went in to cardiac arrest - asystole after used [facility] room. Code blue called and  Martina Puente was successfully resuscitated with in few minutes  The cardiac arrest happened while the Nurse was with the pat. .  Later pat regain consciousness but remain hypoxic hence transferred to ICU and got Intubated  And appears stable    Pat at high risk of sudden cardiac death in view of sever critical aortic stenosis. After initial arrest the chance of second arrest is quit high unless the severe aortic stenosis addressed urgently. I believe this pat need to have TAVR ASAP preferably tomorrow. To this effect I talked to dr. Vivien Rahman to address it . Dr. Sam Gandhi share similar concern and agreed to proceed for TAVR semi-emergently.     Appreciate all team involved in the code blue to save my pat's life including ICU team    Cecile Diaz MD

## 2020-12-19 NOTE — PROGRESS NOTES
2240: Patient arrived to unit from 2E. Patient emergently intubated upon arrival to unit. See Sedation Narrator for documentation regarding intubation. 2250: Spoke with Arden Jordan RN with Dr. Aguirre Alert office. Informed of new orders from Dr. Christin Marvin for STAT CT scans of abdomen/pelvis and chest with and without contrast in preparation for TAVR in the AM. CAITIE Manjarrez informed of patient's most recent creatinine level.     2300: Patient's step-daughter, Kranthi Mayfield, at bedside. Updates given and all questions answered. 2338: STAT labs obtained at this time. 2350: Patient transported to CT scan at this time. 0040: Patient returned from CT scan at this time. Patient placed back on continuous ICU bedside monitor. 0050: Patient's daughters at bedside. All questions answered. Instructed patient's daughters that per Dr. Christin Marvin, he would like them here at 0700 to explain TAVR procedure. Patient's daughters verbalize understanding.

## 2020-12-19 NOTE — H&P
CRITICAL CARE PROGRESS NOTE      Patient:  Alexandr Campoverde    Unit/Bed:4D-11/011-A  YOB: 1931  MRN: [id number]   PCP: Tika Steven MD  Date of Admission: 12/18/2020  Chief Complaint:-Cardiac arrest    Assessment and Plan:    1. Acute respiratory failure,hypoxia: Status post cardiac arrest.  Patient was orally intubated and connected to PCV mode of ventilation continue with lung protection strategies targeting a peak pressure 35 and less and plateau 30 and less. 2. Cardiac arrest: Bradycardia-asystole, with known severe AS this event was witnessed and occurred after activity of walking to bathroom. ROSC was obtained after 2 minutes CPR and epinephrine x1 was given. Patient was purposeful and oriented. Currently on no pressors to support blood pressure. Patient was not a candidate for TTH. EKG no acute. CT head no acute. CTA abdomen and chest are pending. Cardiology-Dr Larissa Abrams was notified and case was reviewed. Plans for TAVR in AM.  3. Severe AS: ECHO 12/2020 LVEF 40-45% Severe/Critical/AS with valve area of 0.5 to 0.6 sq cm. Valve gradient 125 mmHg. Plan for TAVR 12/19/2020  4. Acute Hyponatremia:  12/18 1320 - 2318 Sodium 135-127 likely secondary to hypervolemia ? Etomidate may have contributed. MAR was reviewed. Lasix IVPX1 given. 5. Acute on chronic biventricular heart failure: in the setting of severe AS, IVP Lasix given X1,   6. Nonobstructive CAD: 12/18 RHC/LHC  LM-Ostial 10% ostial stenosis, ASA was given, monitor. 7. Impaired glucose tolerance:  Monitor if greater than 150 will complete A1C and monitor with ACCU and SSI. 8. Hypoalbuminemia:  monitor  9. Anemia, normocytic:  Monitor and trend  10. Thrombocytopenia, mild:  No ecchymotic or bruising noted. Patient on ASA. Fibrinogen/FSP pending. Repeat and monitor.      INITIAL H AND P AND ICU COURSE:  Andrey Crenshaw is a 59-year-old  female admitted to [address] ICU on 12/18/2020 status post cardiac arrest. Patient has a past medical history that is significant for lifetime non-smoker, essential hypertension, Bi-Ventricular heart failure-2020 ECHO LVEF 40 to 05%, grade 1 diastolic, severe AS with valve area of 0.5-0.6 moderate global hypokinesis of left ventricle, recent RHC and LHC 2020 LM-Ostial 10% ostial stenosis, anemia, normocytic IBS, right hand weakness secondary to atrophy of the muscle, dyslipidemia. Recent [facility] ER evaluation on 2020 with complaints of shortness of breath and chest pain. Patient was treated for acute on on chronic congestive heart failure associated with heart murmur. Prescription was given for Lasix and Potassium with last dose 2020. She was followed up as an outpatient with Dr. Arvind Simmons in cardiology clinic on 2020 and 2020    Rojas Dawkins was admitted earlier on 2020 as an outpatient under the care of Dr. Arvind Simmons for right and left-sided heart catheterization in preparation for TAVR procedure. 2020 2218 patient had walked to the restroom with complaints of chest pain and dizziness. Per documentation patient became unresponsive in asystole was noted on cardiac monitor. CODE BLUE was called with ROSC obtained after 2 minutes of CPR and 1 round of epi given. Patient was awake answering questions appropriately and following commands. Monitor Sinus Tachycardia with no acute changes noted on EKG. She became hypoxic with SaO2 74% while on 100% NRB Mask. Patient was emergently intubated and transferred to ICU for further management and care. Past Medical History:  See HPI. Family History: Mother and father are . Social History: Lifetime non-smoker, denies any alcohol or illicit drug use. She is a , and retired. ROS   GENERAL: Positive for fatigue, denies any fever or chills. SKN: No lesions or rashes. Positive for right wrist dressing and right groin dressing dry and intact. No hematoma.   HEAD: No headaches or recent injury ? 12/18/2020 2318 sodium 127 potassium 3.8 chloride 76 CO2 21 BUN is 11 creatinine 0.8 glucose 135 calcium is 8.9  ? Albumin is 2.8 alk phos is 62 ALT 16 AST is 80 total bili 0.5  ? White count 10.0 hemoglobin 11.3 hematocrit three 4.3 platelet count 176  ? INR 1.06  ? Covid-19-negative  ? 12/19/2020 CT head, no acute intracranial findings. No intracranial mass, midline shift, hydrocephalus, or acute hemorrhage. ? 12/19/2020 CTA Chest and ABD-pending  ? 12/18/2020 chest x-ray, perihilar and left lower lung opacities. Mild interstitial thickening. Probable small left pleural effusion. No pneumothorax. ? EKG sinus tachycardia heart rate 121  QTC is 423 axis deviation LVH with repolarization abnormality 8 T waves noted in V1 V2 V3  ? Cardiac telemetry sinus tachycardia        Seen with multidisciplinary ICU team yes. Meets Continued ICU Level Care Criteria:    [x] Yes   [] No - Transfer Planned to listed location:  [] HOSPITALIST CONTACTED-      Case and plan discussed with Dr. Jaymie Chris.         Electronically signed by DEBBIE Bates - CNP  CRITICAL CARE SPECIALIST

## 2020-12-19 NOTE — CONSULTS
[email or web address]    Patient's Name/Date of Birth: Paty Bobby / 9/25/1931 (79 y.o.)    Date: December 19, 2020     Referring Provider: Dash Campbell MD    CHIEF COMPLAINT: Severe AS      HPI: This is a pleasant 80 y.o. female presents without any major cardiac history of 2 weeks of worsening sob, and chest pain, 5/10. She is unable to walk to the mailbox anymore and prior to this was doing all her ADLs. She still lives alone, drives a car, and independently cares for herself. Her daughter is present. She has no malignancies. She has recently seen a dentist.  She had a cath today, showing no significant CAD. She has no active malignancies. Denies a/t/d. No major family hx. Patient notes that she becomes dizzy with any exertion, but has never passed out. She knows she has had a murmur for years but she felt fine and thus no major work-up was done. TTE reviewed below and consistent with critical AS with reduced EF. Echo:   Results for orders placed during the hospital encounter of 12/02/20   ECHO Complete 2D W Doppler W Color    Narrative Transthoracic Echocardiography Report (TTE)     Demographics      Patient Name    Lucy Marie  Gender               Female                   P      MR #            [id number]       Race                                                       Ethnicity      Account #       [de-identified]       Room Number      Accession       [id number]      Date of Study        12/02/2020   Number      Date of Birth   09/25/1931      Referring Physician  Poonam Mcintyre MD      Age             80 year(s)      Brenda May, [facility]                                      Interpreting         Poonam Jane MD                                   Physician     Procedure    Type of Study      TTE procedure:ECHOCARDIOGRAM COMPLETE 2D W DOPPLER W COLOR. The maximum aortic valve gradient is 125 mmHg, the mean gradient is 84   mmHg, and the peak velocity is 5.6 m/s. Tricuspid Valve   The tricuspid valve structure was normal with normal leaflet separation. DOPPLER: There was no evidence of tricuspid stenosis. Mild tricuspid regurgitation visualized. Right ventricular systolic pressure measures 40 mmhg. Pulmonic Valve   The pulmonic valve leaflets exhibited normal thickness, no calcification,   and normal cuspal separation. DOPPLER: The transpulmonic velocity was   within the normal range with no evidence for regurgitation. Left Atrium   The left atrium is Mildly dilated. Left Ventricle   Left ventricle size is normal.   Mildly increased left ventricle wall thickness. Mild concentric left ventricular hypertrophy. There was moderate global hypokinesis of the left ventricle. Systolic function was moderately reduced. Ejection fraction is visually estimated in the range of 40% to 45%. Doppler parameters were consistent with abnormal left ventricular   relaxation (grade 1 diastolic dysfunction). Right Atrium   Right atrial size was normal.      Right Ventricle   The right ventricular size was normal with normal systolic function and   wall thickness. Pericardial Effusion   The pericardium was normal in appearance with no evidence of a pericardial   effusion. Pleural Effusion   No evidence of pleural effusion. Aorta / Great Vessels   -Aortic root dimension within normal limits.   -The Pulmonary artery is within normal limits. -IVC size is within normal limits with normal respiratory phasic changes.      M-Mode/2D Measurements & Calculations      LV Diastolic    LV Systolic Dimension: 3  AV Cusp Separation: 0.8 cmLA   Dimension: 4.1  cm                        Dimension: 3.2 cmAO Root   cm              LV Volume Diastolic: 54.4 Dimension: 3 cmLA Area: 17.7   LV FS:26.8 %    ml                        cm^2 LV PW           LV Volume Systolic: 35 ml   Diastolic: 1.3  LV EDV/LV EDV Index: 74.2   cm              ml/48 m^2LV ESV/LV ESV   Septum          Index: 35 ml/23 m^2       RV Diastolic Dimension: 2.3 cm   Diastolic: 1.3  EF Calculated: 52.8 %   cm                                        LA/Aorta: 1.07                                             Ascending Aorta: 3 cm                                             LA volume/Index: 48.6 ml /32m^2                   LVOT: 1.9 cm     Doppler Measurements & Calculations      MV Peak E-Wave: 99.4 AV Peak Velocity: 560   LVOT Peak Velocity: 80.9 cm/s   cm/s                 cm/s                    LVOT Mean Velocity: 56.6 cm/s   MV Peak A-Wave: 133  AV Peak Gradient:       LVOT Peak Gradient: 3   cm/s                 125.44 mmHg             mmHgLVOT Mean Gradient: 2   MV E/A Ratio: 0.75   AV Mean Velocity: 438   mmHg   MV Peak Gradient:    cm/s   3.95 mmHg            AV Mean Gradient: 80    TV Peak E-Wave: 60.4 cm/s   MV Mean Gradient: 4  mmHg                    TV Peak A-Wave: 47.1 cm/s   mmHg                 AV VTI: 138 cm   MV Mean Velocity:    AV Area                 TV Peak Gradient: 1.46 mmHg   91.4 cm/s            (Continuity):0.45 cm^2  TR Velocity:212 cm/s   MV Deceleration      AV Acceleration Time:   TR Gradient:17.98 mmHg   Time: 194 msec       130 msec                PV Peak Velocity: 65.6 cm/s   MV P1/2t: 93 msec    LVOT VTI: 22 cm         PV Peak Gradient: 1.72 mmHg   MVA by PHT:2.37 cm^2 IVRT: 102 msec   MV Area   (continuity): 1.55   cm^2                 AV DVI (VTI): 0.16AV   MV E' Septal         DVI (Vmax):0.14   Velocity: 5.3 cm/s   MV A' Septal   Velocity: 10.2 cm/s   MV E' Lateral   Velocity: 5 cm/s   MV A' Lateral   Velocity: 10.7 cm/s   E/E' septal: 18.75   E/E' lateral: 19.88   MR Velocity: 482   cm/s     [id number]  [id number]%3d All labs, EKG's, diagnostic testing and images as well as cardiac cath, stress testing were reviewed during this encounter    Past Medical History:   Diagnosis Date    Hyperlipidemia     Hypertension     Osteoporosis     Pneumonia      Past Surgical History:   Procedure Laterality Date    CARPAL TUNNEL RELEASE Right     ELBOW SURGERY      2013 right elbow    TONSILLECTOMY       Current Facility-Administered Medications   Medication Dose Route Frequency Provider Last Rate Last Admin    phenylephrine (DANIELLE-SYNEPHRINE) 50 mg in dextrose 5 % 250 mL infusion  100 mcg/min Intravenous Continuous DEBBIE Guerrero CNP        aspirin tablet 325 mg  325 mg Oral Once Perry Luna PA-C        acetaminophen (TYLENOL) tablet 650 mg  650 mg Oral Q4H PRN Juan Ledesma MD        fentaNYL (SUBLIMAZE) injection 25 mcg  25 mcg Intravenous Q1H PRN DEBBIE Guerrero CNP        chlorhexidine (PERIDEX) 0.12 % solution 15 mL  15 mL Mouth/Throat BID DEBBIE Guerrero CNP   15 mL at 12/19/20 0200    famotidine (PEPCID) injection 20 mg  20 mg Intravenous Daily DEBBIE Guerrero CNP        sodium chloride flush 0.9 % injection 10 mL  10 mL Intravenous 2 times per day DEBBIE Guerrero CNP        sodium chloride flush 0.9 % injection 10 mL  10 mL Intravenous PRN DEBBIE Guerrero CNP        [Held by provider] enoxaparin (LOVENOX) injection 40 mg  40 mg Subcutaneous Daily DEBBIE Guerrero CNP        promethazine (PHENERGAN) tablet 12.5 mg  12.5 mg Oral Q6H PRN DEBBIE Guerrero CNP        Or    ondansetron (ZOFRAN) injection 4 mg  4 mg Intravenous Q6H PRN DEBBIE Guerrero CNP        polyethylene glycol (GLYCOLAX) packet 17 g  17 g Oral Daily PRN DEBBIE Guerrero CNP  midazolam HCl (VERSED) 100 mg in dextrose 5 % 100 mL infusion  1 mg/hr Intravenous Continuous DEBBIE Catherine - CNP 1 mL/hr at 12/18/20 2348 1 mg/hr at 12/18/20 2348    propofol injection  10 mcg/kg/min Intravenous Titrated Daija Lemus APRN - CNP 9.6 mL/hr at 12/18/20 2330 30 mcg/kg/min at 12/18/20 2330     Prior to Admission medications    Medication Sig Start Date End Date Taking? Authorizing Provider   Multiple Vitamins-Minerals (OCUVITE PO) Take 1 tablet by mouth daily   Yes Historical Provider, MD   Ferrous Sulfate (IRON PO) Take 1 tablet by mouth daily   Yes Historical Provider, MD   aspirin 81 MG EC tablet Take 81 mg by mouth daily   Yes Historical Provider, MD   ZINC PO Take 1 tablet by mouth daily   Yes Historical Provider, MD   furosemide (LASIX) 20 MG tablet Take 0.5 tablets by mouth daily 12/18/20  Yes Pola Haynes MD   potassium chloride (KLOR-CON M) 10 MEQ extended release tablet Take 1 tablet by mouth daily 12/18/20  Yes Pola Haynes MD   amLODIPine (NORVASC) 5 MG tablet  10/1/20  Yes Historical Provider, MD   Omega-3 Fatty Acids (FISH OIL OMEGA-3 PO) Take by mouth   Yes Historical Provider, MD   vitamin D (ERGOCALCIFEROL) 80612 units CAPS capsule Take 50,000 Units by mouth once a week   Yes Historical Provider, MD   Multiple Vitamins-Minerals (MULTI VITAMIN/MINERALS) TABS Take  by mouth. Yes Historical Provider, MD   lovastatin (MEVACOR) 10 MG tablet Take 1 tablet by mouth nightly. 1/31/12  Yes Jayden Ayon MD   dicyclomine (BENTYL) 10 MG capsule Take 1 capsule by mouth 4 times daily (before meals and nightly). 1/31/12  Yes Jayden Ayon MD   loratadine (CLARITIN) 10 MG tablet Take 10 mg by mouth daily.      Yes Historical Provider, MD   Scheduled Meds:   aspirin  325 mg Oral Once    chlorhexidine  15 mL Mouth/Throat BID    famotidine (PEPCID) injection  20 mg Intravenous Daily    sodium chloride flush  10 mL Intravenous 2 times per day  [Held by provider] enoxaparin  40 mg Subcutaneous Daily     Continuous Infusions:   phenylephrine (DANIELLE-SYNEPHRINE) 50mg/250mL infusion      midazolam 1 mg/hr (12/18/20 1496)    propofol 30 mcg/kg/min (12/18/20 8480)     PRN Meds:.acetaminophen, fentanNYL, sodium chloride flush, promethazine **OR** ondansetron, polyethylene glycol    Allergies   Allergen Reactions    Tomato Hives     No family history on file. Social History     Socioeconomic History    Marital status:      Spouse name: Not on file    Number of children: Not on file    Years of education: Not on file    Highest education level: Not on file   Occupational History    Not on file   Social Needs    Financial resource strain: Not on file    Food insecurity     Worry: Not on file     Inability: Not on file    Transportation needs     Medical: Not on file     Non-medical: Not on file   Tobacco Use    Smoking status: Never Smoker    Smokeless tobacco: Never Used   Substance and Sexual Activity    Alcohol use: No     Comment: rare    Drug use: No    Sexual activity: Not on file   Lifestyle    Physical activity     Days per week: Not on file     Minutes per session: Not on file    Stress: Not on file   Relationships    Social connections     Talks on phone: Not on file     Gets together: Not on file     Attends [employer] service: Not on file     Active member of club or organization: Not on file     Attends meetings of clubs or organizations: Not on file     Relationship status: Not on file    Intimate partner violence     Fear of current or ex partner: Not on file     Emotionally abused: Not on file     Physically abused: Not on file     Forced sexual activity: Not on file   Other Topics Concern    Not on file   Social History Narrative    Not on file     ROS:   Constitutional: Denies any recent wt change.   Eyes:  Denies any blurring or double vision, no glaucoma Ears/Nose/Mouth/Throat:  Denies any chronic sinus/rhinitis, bleeding gums  Cardiovascular:  As described above. Respiratory:  Denies any frequent cough, wheezing or coughing up blood  Genitourinary:  Denies difficulty with urination and kidney stones  Gastrointestinal:  Denies any chronic problems with abdominal pain, nausea, vomiting or diarrhea  Musculoskeletal:  Denies any joint pain, back pain, or difficulty walking  Integumentary:  Denies any rash  Neurological:  No numbness or tingling  Endocrine:  Denies any polydipsia. Hematologic/Lymphatic:  Denies any hemorrhage or lymphatic drainage problems. Labs:  CBC:   Recent Labs     12/18/20  1320 12/18/20  2318   WBC 8.0 10.0   HGB 11.5* 11.3*   HCT 35.0* 34.3*   MCV 95.1 98.8    126*     BMP:   Recent Labs     12/18/20  1320 12/18/20  2318 12/19/20  0350    127* 138   K 4.1 3.8 4.0    96* 107   CO2 24 21* 22*   PHOS  --  3.4  --    BUN 13 11 12   CREATININE 0.9 0.8 0.7     Accucheck Glucoses: No results for input(s): POCGLU in the last 72 hours. Cardiac Enzymes: No results for input(s): CKTOTAL, CKMB, CKMBINDEX, TROPONINI in the last 72 hours.   PT/INR:   Recent Labs     12/18/20  1320 12/18/20  2318   INR 0.99 1.06     APTT:   Recent Labs     12/18/20  1320 12/18/20  2318   APTT 28.3 26.1     Liver Profile:  Lab Results   Component Value Date    AST 80 12/18/2020    ALT 16 12/18/2020    BILIDIR <0.2 12/18/2020    BILITOT 0.4 12/18/2020    ALKPHOS 62 12/18/2020     Lab Results   Component Value Date    CHOL 208 01/17/2013    HDL 91 01/17/2013    HDL 68 01/11/2012    TRIG 56 01/17/2013     TSH:   Lab Results   Component Value Date    TSH 2.210 12/19/2020     UA:   Lab Results   Component Value Date    COLORU YELLOW 12/18/2020    PHUR 7.0 12/18/2020    LABCAST NONE SEEN 11/24/2020    LABCAST NONE SEEN 11/24/2020    WBCUA 0-2 11/24/2020    RBCUA 0-2 11/24/2020    YEAST NONE SEEN 11/24/2020    BACTERIA NONE SEEN 11/24/2020 SPECGRAV 1.008 12/18/2020    LEUKOCYTESUR NEGATIVE 12/18/2020    LEUKOCYTESUR TRACE 11/24/2020    UROBILINOGEN 0.2 12/18/2020    BILIRUBINUR NEGATIVE 12/18/2020    BLOODU NEGATIVE 12/18/2020         Physical Exam:  Vitals:    12/19/20 0725   BP: (!) 110/59   Pulse: 104   Resp: 16   Temp: 100.6 °F (38.1 °C)   SpO2: 98%    No intake or output data in the 24 hours ending 12/19/20 0747   General:  No acute distress  Neck: Supple, no JVD, no carotid bruits  Heart: Regular rhythm normal S1, no rubs, 3/6 CASSI, S2 obliterated, PeT of the carotids  Lungs: clear to ascultation no rales, wheezes, or rhonchi  Abdomen: positive bowel sounds, soft, non-tender, non-distended, no bruits, no masses  Extremities:no clubbing, cyanosis or edema  Neurologic: alert and oriented x 3, cranial nerves 2-12 grossly intact, motor and sensory intact, moving all extremities  Skin: No rashes    Assessment:  Severe Symptomatic Aortic Stenosis - Given the STS scores, frailty, comorbidities, and the imaging findings, the patient is clinically a candidate for TAVR (See PreTAVR Assesment). Discussed transcatheter aortic valve replacement (TAVR) with the patient/family regarding risks, benefits, alternatives to the procedure. The patient understands the associated visits with CT surgery and also understands the need for TAVR CTA. The patient is FULL CODE. The POA is listed in the chart. We will schedule the above as appropriate. Once complete and appropriate based on the findings, a procedure date will be aligned at [address] [place] [address], and we will notify the patient of further instructions. We had a long discussion with myself, family, patient, and structural heart coordinators. The family and patient were explained the risks/benefits/alternatives of the procedure, how the procedure works, the work-up, post-procedural care, and all of the possible complications of the procedure, including, but not limited to vascular injury, debilitating stroke, need for permanent pacemaker, and death. The patient/family would like to pursue TAVR at our facility and we will work towards this goal.         The patient and family understand that should there be any findings or issues that arise during the evaluation that would preclude TAVR, that this will need to be evaluated prior to proceeding with a percutaneous approach. Further, a unified heart team approach will be performed prior to proceeding with TAVR which includes the patient's primary care givers, cardiologist, and the entire structural heart team (interventionalist, CT surgeons, structural heart NP). The patient and family appeared to understand everything, all of their questions were answered to their satisfaction and they are agreeable to proceed with further evaluation for TAVR. In the meantime, the patient is to stop driving, live with her relatives, and avoid dehydration. I explained she is at risk for SCD due to her critical AS and elevated velocities. She understood and will comply. Thank you for allowing us to participate in the care of this patient. Please do not hesitate to call us with questions. Thank you for allowing us to participate in the care of this patient. Please do not hesitate to call us with questions.     Electronically signed by Dariusz Pope MD on 12/19/2020 at 7:47 AM    Interventional Cardiology - [facility]

## 2020-12-19 NOTE — PROCEDURES
ICU PROCEDURE - ENDOTRACHEAL INTUBATION    Cara Melton     MRN#: [id number]  20      [email or web address]     : 1931      INDICATION: acute respiratory failure, hypoxia    TIME OUT: taken    Permission obtained, risks/benefits reviewed:    ANESTHESIA:   []Ketamine  []Ativan  [] Morphine  []Propofol  [x]Other medications:Etomidate, Versed    ESTIMATED BLOOD LOSS:  None. COMPLICATIONS:  []N/A  [] Other:    LARYNGOSCOPIC AIRWAY GRADE (CORMACK-LEHANE):[]1  []2a  [x]2b []3  []4        INTUBATION EQUIPMENT USED:  [x] Direct laryngoscope only    OUTCOME: Successful placement of #  7.5  Taperguard Evac endotracheal via   [x]Oral route    INSERTION DEPTH:  23    cm from   [x]lip           CONFIRMATION OF TUBE POSITION:   [x]Capnography - Strong & repeatable exhaled CO2 detection   [x]Multiple point auscultation   [x]SpO2 response   []STAT X-ray   []Bronchoscopic assessment    UNUSUAL FINDINGS:    PROCEDURE:     Using direct video laryngoscopy, the vocal cords were visualized and the endotracheal tube was placed through the cords under direct vision. Good breath sounds were auscultated bilaterally without sounds over abdomen. Appropriate strong & repeatable exhaled CO2 detection was confirmed.        Electronically signed by DEBBIE Jean CNP on 2020 at 11:03 PM

## 2020-12-19 NOTE — CODE DOCUMENTATION
Pt's step-daughter Sveta Marinelli called and notified of change in condition.  On her way back to hospital.

## 2020-12-19 NOTE — CONSULTS
CT/CV Surgery Consult Note    2020 7:55 AM  Surgeon:  Dr. Yohan Carrington     Reason for Consult: Aortic stenosis/Cardiac arrest    CC:   Cardiac arrest    HPI:    Ms. Marquis Rodney  is a 80year old female with a PMH including pneumonia, osteoporosis, hypertension, hyperlipidemia, and aortic stenosis. The pt presented yesterday for an outpatient cath. Cath was normal. Following the cath the pt became dizzy and coded. She was stabllized and transferred to the ICU. Cath showed the following     \" Procedure Summary   left heart cath   LM-OSTIAL 10% STENOSIS OSTIAL   LAD-PATENT   LCX- PATENT   RCA - MPATENT      ATTEMPTED TO CROSS THE AORTIC VALVE      RIGHT HEART CATH   PRESSURE   PA 55/26 MEAN 38 MMHG   RV 56/3 MEAN 11 MMHG   RA 14/9 MEAN 8 MMHG   PCWP 26 MMHG   NO STEP UP ON O2 SATURATION\"    Her last ECHO was obtained on 20 and showed EF 40-45%. There is severe/critical/ aortic stenosis with valve area of 0.5 to 0.6 sq   cm. The maximum aortic valve gradient is 125 mmHg, the mean gradient is 84mmHg, and the peak velocity is 5.6 m/s.            Vital Signs: BP (!) 110/59   Pulse 104   Temp 100.6 °F (38.1 °C) (Bladder)   Resp 16   Ht 5' 2\" (1.575 m)   Wt 91 lb 0.8 oz (41.3 kg)   SpO2 98%   BMI 16.65 kg/m²    Temp (24hrs), Av.5 °F (37.5 °C), Min:98.5 °F (36.9 °C), Max:100.6 °F (38.1 °C)      PULSE OXIMETRY RANGE: SpO2  Av.5 %  Min: 79 %  Max: 100 %    SUPPLEMENTAL O2: O2 Flow Rate (L/min): 10 L/min     Labs:   CBC:     Recent Labs     20  1320 20  2318   WBC 8.0 10.0   HGB 11.5* 11.3*   HCT 35.0* 34.3*   MCV 95.1 98.8    126*   APTT 28.3 26.1   INR 0.99 1.06     BMP:   Recent Labs     20  1320 20  2318 12/19/20  0350    127* 138   K 4.1 3.8 4.0    96* 107   CO2 24 21* 22*   PHOS  --  3.4  --    BUN 13 11 12   CREATININE 0.9 0.8 0.7     Last HgA1C: No results found for: LABA1C    Imaging:  I have reviewed all imaging  Narrative PROCEDURE: CTA CHEST W WO CONTRAST, CTA ABDOMEN PELVIS W WO CONTRAST       CLINICAL INFORMATION: TAVR evaluation.       COMPARISON: No prior study.       TECHNIQUE: Cardiac gated helical CT of the chest, abdomen and pelvis during intravenous administration of 125 mL  injected in the left [facility] with multiplanar reconstructions.        All CT scans at this facility use dose modulation, iterative reconstruction, and/or weight-based dosing when appropriate to reduce radiation dose to as low as reasonably achievable.           Impression   FINDINGS/IMPRESSION:   Emilia Shamir are prominent bilateral pleural effusions with adjacent atelectasis. Lungs otherwise are clear. Endotracheal and NG tubes are in place. No acute vascular abnormality is identified. No acute intra-abdominal or intrapelvic abnormality is identified.       This is a preliminary interpretation. A vascular radiologist will render a formal interpretation later time.                   **This report has been created using voice recognition software. It may contain minor errors which are inherent in voice recognition technology. **             Narrative   PROCEDURE: CTA CHEST W WO CONTRAST, CTA ABDOMEN PELVIS W WO CONTRAST       CLINICAL INFORMATION: TAVR evaluation.       COMPARISON: No prior study.       TECHNIQUE: Cardiac gated helical CT of the chest, abdomen and pelvis during intravenous administration of 125 mL  injected in the left [facility] with multiplanar reconstructions.        All CT scans at this facility use dose modulation, iterative reconstruction, and/or weight-based dosing when appropriate to reduce radiation dose to as low as reasonably achievable.           Impression   FINDINGS/IMPRESSION:   Emilia Shamir are prominent bilateral pleural effusions with adjacent atelectasis. Lungs otherwise are clear. Endotracheal and NG tubes are in place. No acute vascular abnormality is identified. No acute intra-abdominal or intrapelvic abnormality is identified.     This is a preliminary interpretation. A vascular radiologist will render a formal interpretation later time.                   **This report has been created using voice recognition software. It may contain minor errors which are inherent in voice recognition technology. **         Narrative   CT head without contrast       Comparison: None       Findings:   No intracranial mass, midline shift, hydrocephalus, or acute hemorrhage. Mild atrophy-like change.       Sinuses and mastoids clear. Fluid in the upper nasopharynx. The orbits are unremarkable. There is no acute fracture.           Impression   1. No acute intracranial findings. 2. Fluid in the upper nasopharynx is nonspecific.       This document has been electronically signed by:  Jake Gomez MD on 12/19/2020 01:29 AM       All CT scans at this facility use dose modulation, iterative    reconstruction, and/or weight-based   dosing when appropriate to reduce radiation dose to as low as reasonably    achievable.         Demographics      Patient Name       Neymar El       Gender                 Female                      Stephanie Fitzgerald      MR #               [id number]       Race                                                            Ethnicity      Account #          [de-identified]       Room Number            0012                                         Height                 61.81 inches      Accession Number   [id number]      Weight                 116.85 pounds      Date of Birth      09/25/1931      BSA                    1.52 m^2      Age                80 year(s)      BMI                    21.5 kg/m^2      Referring          Jose Manuel Perry   Date of Study          12/18/2020   Physician          MD      Performing         Jose Manuel Perry   Interventional   Physician          MD              Physician     Procedure     Procedure Type      Diagnostic procedure:CA w/o LHC w/RT Heart, Oximetry     Conclusions      Procedure Summary left heart cath   LM-OSTIAL 10% STENOSIS OSTIAL   LAD-PATENT   LCX- PATENT   RCA - MPATENT      ATTEMPTED TO CROSS THE AORTIC VALVE      RIGHT HEART CATH   PRESSURE   PA 55/26 MEAN 38 MMHG   RV 56/3 MEAN 11 MMHG   RA 14/9 MEAN 8 MMHG   PCWP 26 MMHG   NO STEP UP ON O2 SATURATION      Recommendations   Recommend aggressive risk factor modification and medical therapy. TO BE SCHEDULED FOR TAVR ASAP      Estimated Blood Loss:10 ml. Complications:No complications. Signatures      ----------------------------------------------------------------   Electronically signed by Ronal Pereyra MD (Performing   Physician) on 12/18/2020 at 18:32   ----------------------------------------------------------------     Indications: Aortic stenosis by Echo.     Medical History     Allergies    - No Known Allergies.     Admission Data  Admission Date: 12/18/2020 Admission Time: 12:56     Clinical Evaluation Leading to Procedure       - The patient's anginal syndrome during the past two weeks was assessed      as: Class III according to the [address] (CCS).     Procedure Data  Procedure Date  Date: 12/18/2020Start: 17:00     The procedure was explained in detail to the patient. Risks, complications  and alternative treatments were reviewed. Written consent was obtained.     Diagnostic Cath Status: Urgent     Fluoroscopy Time: Diagnostic: 0:00 minutes. PCI: 0:00 minutes. Total: 0:00  minutes.     Fluoroscopy Dose: Diagnostic: 286 mGy. PCI: 0 mGy. Total: 286 mGy.     Estimated Blood Loss:10 ml.     Contrast Material    - Isovue 95548 ml     Entry Locations    - Percutaneous access was performed through the Right Brachial Vein. A 5      Fr sheath was inserted.       - Percutaneous access was performed through the Right Radial artery. A 5      Fr sheath was inserted.       - Percutaneous access was performed through the Right Femoral artery.  A 5      Fr sheath was inserted.    Procedure Medications    - Versed/Midazolam I.V. 0.5 mg.       - Fentanyl/Sublimaze I.V. 25 mcg.       - Lidocaine 2% S.C. 2 ml.       - Lidocaine 2% S.C. 1 ml.       - Verapamil 2.5mg/ml I.A. 2.5 mg.       - Heparin I.A. 2000 units.       - Lidocaine 2% S.C. 10 ml.     Diagnostic Catheters    - A5FR JR 4.0 DxTerity Diagnostic Catheter. Unable to cannulate the vessel.       - A5FR JR 4.0 DxTerity Diagnostic Catheterwas used for:RCA Angiography.       - A5Fr. DxTerity JL3.5 Diag. Catheterwas used for:LCA Angiography.       - A5Fr. AL1 Diag. Catheterwas used for:LV Angiogram.Unable to cannulate      the vessel.     Hemodynamics      Condition: Rest      O2 Consumption: Estimated: 154. 70Heart Rate: 106 bpm     Oxygen Saturation  +--------+-----+----+----------------------+----+--------------------------+  ! Location! pCO2 ! pO2 !% Saturation          ! Hgb ! O2 Content                ! +--------+-----+----+----------------------+----+--------------------------+  ! PA      !     !    !61.8                  !11.5!                          ! +--------+-----+----+----------------------+----+--------------------------+  ! RA      !     !    !60.5                  !11.5!                          ! +--------+-----+----+----------------------+----+--------------------------+  ! RV      !     !    !60.5                  !11.5!                          ! +--------+-----+----+----------------------+----+--------------------------+  ! AO      !     !    !92.8                  !11.5!                          ! +--------+-----+----+----------------------+----+--------------------------+     Pressures (mmHg)  +-----+--------------------------------------------------------------------+  ! Site ! Pressure                                                            ! +-----+--------------------------------------------------------------------+  ! PA   !55/26 (38)                                                          ! +-----+--------------------------------------------------------------------+  ! PA   !57/24 (38)                                                          !  +-----+--------------------------------------------------------------------+  ! PCW  !32/39 (26)                                                          !  +-----+--------------------------------------------------------------------+  ! PCW  !32/39 (27)                                                          !  +-----+--------------------------------------------------------------------+  ! RV   !56/3 ,11                                                            ! +-----+--------------------------------------------------------------------+  ! RA   !14/9 (8)                                                            !  +-----+--------------------------------------------------------------------+  ! AO   !109/54 (78)                                                         !  +-----+--------------------------------------------------------------------+  ! AO   !110/70 (86)                                                         !  +-----+--------------------------------------------------------------------+     Cardiac Output  +------+---------------------+------------------------+--------------------+  ! Method! CO (l/min)           ! CI (l/min/m2)           ! SV (ml)             !  +------+---------------------+------------------------+--------------------+  ! Diamond Baig  ! 3.06                 !2                       !28.8                !  +------+---------------------+------------------------+--------------------+     Vascular Resistance (dynes x sec x cm-5)     +---------------------------------+----+---+------+------+---------+-------+  ! CO method                        ! TSVR! SVR! TPVR  ! PVR   ! TPVR/TSVR! PVR/SVR!  +---------------------------------+----+---+------+------+---------+-------+  ! Zay                             !    !   !992.65!300.65!         !       ! Respiratory:  Normal respiratory effort. Clear to auscultation, bilaterally without rales/wheezes/rhonchi. Cardiovascular:  Sinus tach. 3/6 CASSI. Abdomen: Soft, non-tender, non-distended with normal bowel sounds. Musculoskeletal:  No clubbing, cyanosis or edema bilaterally. Full range of motion without deformity. Skin: Skin color, texture, turgor normal.  No rashes or lesions. Neurologic:  Neurovascularly intact without any focal sensory/motor deficits        Active Problem List  Patient Active Problem List   Diagnosis    HTN (hypertension)    Hyperlipidemia    Osteoporosis    Allergic rhinitis    IBS (irritable bowel syndrome)    Osteoarthrosis involving multiple sites    Medication monitoring encounter    Postmenopausal bone loss    Anemia    Right hand weakness, atrophy of thumb muscle.  Chest pressure  on exertion    SOB (shortness of breath) on exertion    Chronic congestive heart failure (HCC)    Systolic ejection murmur 5/6 best aortic area    Pansystolic murmur- best tricuspid area 5/6    Severe calcific aortic stenosis    Cardiomyopathy ([address]    S/P CATH - LHC AND RHC- LM-OSTAIL 10% STENOSIS, LAD-PATENT, LCX-P, RCA-P, ; RHC PAP 55/26 MEAN 38 MMHG,PCWP 26 MMHG,- TO BE SCHEDULED FOR TAVR  ASAP WITH DR. CARTAGENA    S/P right and left heart catheterization    Cardiac arrest [facility])       Assessment:   Severe aortic stenosis    Plan: 12/19/20  3 80year-old male with severe symptomatic aortic stenosis. Patient clearly is at high risk for surgical aortic valve replacement, for reasons of age, frailty, sts score, and current medical status. Recommend proceed with evaluation for TAVR. The plan of care was discussed in detail with Dr. Kathi Moya discussed in detail with the patient, who understands and has no further questions. Time spent with patient: 80 minutes, of which more than 50% was spent counseling/coordinating the patient's care.     Willy Garcia PA-C

## 2020-12-19 NOTE — CARE COORDINATION
DISASTER CHARTING    12/19/20, 3:12 PM EST    DISCHARGE ONGOING EVALUATION:     [facility] day: 1  Location: -11/011-A Reason for admit: Cardiac arrest [facility]) [I46.9]     12/18 Cardiac Cath: Clean  12/18 CODE BLUE - Asystole, 2 min to ROSC  12/18 Intubated  12/19 TAVR    Barriers to Discharge: Presented for scheduled OP Cardiac Cath on 12/18 in preparation for TAVR procedure. Later that evening, walked to restroom and c/o chest pain and dizziness, became unresponsive and asystole was noted on monitor. CODE BLUE with ROSC after 2 minutes CPR and 1 round of epi. Awoke, answered questions, and followed commands. Became hypoxic with sats 74% on 100% NRB mask. Emergently intubated and transferred to ICU. Went for emergent TAVR this morning. Returned to ICU on vent. Ruiz off this afternoon. Remains on vent w/ETT on PCV, peep 6, FIO2 35%, sats 99%. Tmax 100.6. NSR. Follows commands. SCDs. Isaura. Intensivist and Cardiology following. Cardiac Rehab consulted. IVF, versed @ 2 mg/hr, asa, plavix, IV ancef, pepcid, prn IV fentanyl. Received 250 ml fld bolus x1 and 20 mg IV lasix x1 today. Procal 0.14, pro-bnp 8639, alb 2.8, ast 80, hgb 11.3, plt 126. COVID 19 was negative. Respiratory and urine cultures sent. PCP: Celena Tan MD  Patient Goals/Plan/Treatment Preferences: Called and spoke with Sybil's daughter, Sandy Sat at [phone]. She states Gale Franks lives at home alone and did not use any DME or have any HH services PTA. She drives, cares for herself independently, and has a PCP. Discussed that she will need someone to stay with her 24/7 for at least 1 week post-discharge. [place] states that will be a problem as they work. Explained SW will meet with them Monday to discuss options. IMM reveiwed. IMM left on patient's bedside table.

## 2020-12-19 NOTE — BRIEF OP NOTE
[facility]  Sedation/Analgesia Post Sedation Record    Pt Name: Karol Rome  Account number: [de-identified]  MRN: [id number]  YOB: 1931  Procedure Performed By: Boston Meuse MD Arleta Eisenmenger, [address]  Primary Care Physician: Abiodun Lehman MD  Date: 12/19/2020    POST-PROCEDURE    Physicians/Assistants: Boston Meuse MD Arleta Eisenmenger, RPVI    Procedure Performed:TAVR      Sedation/Anesthesia: Versed/ Fentanyl and 2% xylocaine local anesthesia. Estimated Blood Loss: < 50 ml. Specimens Removed: None         Disposition of Specimen: N/A        Complications: No Immediate Complications.        Post-procedure Diagnosis/Findings:     Successful Evolut PRO+ 26 via RFA               Boston Meuse MD Arleta Eisenmenger, RPVI  Electronically signed 12/19/2020 at 11:38 AM  Interventional Cardiology

## 2020-12-19 NOTE — PRE SEDATION
[facility]  Sedation/Analgesia History & Physical    Pt Name: Cara Melton  Account number: [de-identified]  MRN: [id number]  YOB: 1931  Provider Performing Procedure: Estephanie Grossman MD  Referring Provider: Stanley Milton MD   Primary Care Physician: Enmanuel Aguayo MD  Date: 12/19/2020    PRE-PROCEDURE    Code Status: FULL CODE  Brief History/Pre-Procedure Diagnosis:   Cardiac arrest  Severe AS    Consent: : I have discussed with the patient risks, benefits, and alternatives (and relevant risks, benefits, and side effects related to alternatives or not receiving care), and likelihood of the success. The patient and/or representative appear to understand and agree to proceed. The discussion encompasses risks, benefits, and side effects related to the alternatives and the risks related to not receiving the proposed care, treatment, and services.   phenylephrine (DANIELLE-SYNEPHRINE) 50 mg in dextrose 5 % 250 mL infusion, 100 mcg/min, Intravenous, Continuous, Daija Ram Stage, APRN - CNP    aspirin tablet 325 mg, 325 mg, Oral, Once, Lorena Zhu PA-C    acetaminophen (TYLENOL) tablet 650 mg, 650 mg, Oral, Q4H PRN, Lilibeth Green MD    fentaNYL (SUBLIMAZE) injection 25 mcg, 25 mcg, Intravenous, Q1H PRN, Daija Ram Stage, APRN - CNP    chlorhexidine (PERIDEX) 0.12 % solution 15 mL, 15 mL, Mouth/Throat, BID, Daija Ram Stage, APRN - CNP, 15 mL at 12/19/20 0200    famotidine (PEPCID) injection 20 mg, 20 mg, Intravenous, Daily, Daija Ram Stage, APRN - CNP    sodium chloride flush 0.9 % injection 10 mL, 10 mL, Intravenous, 2 times per day, Daija Ram Stage, APRN - CNP    sodium chloride flush 0.9 % injection 10 mL, 10 mL, Intravenous, PRN, Daija Ram Stage, APRN - CNP    [Held by provider] enoxaparin (LOVENOX) injection 40 mg, 40 mg, Subcutaneous, Daily, Daija Ram Stage, APRN - CNP    promethazine (PHENERGAN) tablet 12.5 mg, 12.5 mg, Oral, Q6H PRN **OR** ondansetron (ZOFRAN) injection 4 mg, 4 mg, Intravenous, Q6H PRN, Daija Ram Stage, APRN - CNP    polyethylene glycol (GLYCOLAX) packet 17 g, 17 g, Oral, Daily PRN, Daija Ram Stage, APRN - CNP    midazolam HCl (VERSED) 100 mg in dextrose 5 % 100 mL infusion, 1 mg/hr, Intravenous, Continuous, Daija Ram Stage, DEBBIE - CNP, Last Rate: 2 mL/hr at 12/19/20 0700, 2 mg/hr at 12/19/20 0700    propofol injection, 10 mcg/kg/min, Intravenous, Titrated, Daija Ram Stage, APRN - CNP, Stopped at 12/19/20 0745  Prior to Admission medications    Medication Sig Start Date End Date Taking?  Authorizing Provider   Multiple Vitamins-Minerals (OCUVITE PO) Take 1 tablet by mouth daily   Yes Historical Provider, MD Ferrous Sulfate (IRON PO) Take 1 tablet by mouth daily   Yes Historical Provider, MD   aspirin 81 MG EC tablet Take 81 mg by mouth daily   Yes Historical Provider, MD   ZINC PO Take 1 tablet by mouth daily   Yes Historical Provider, MD   furosemide (LASIX) 20 MG tablet Take 0.5 tablets by mouth daily 12/18/20  Yes Kaitlyn Tang MD   potassium chloride (KLOR-CON M) 10 MEQ extended release tablet Take 1 tablet by mouth daily 12/18/20  Yes Kaitlyn Tang MD   amLODIPine (NORVASC) 5 MG tablet  10/1/20  Yes Historical Provider, MD   Omega-3 Fatty Acids (FISH OIL OMEGA-3 PO) Take by mouth   Yes Historical Provider, MD   vitamin D (ERGOCALCIFEROL) 24229 units CAPS capsule Take 50,000 Units by mouth once a week   Yes Historical Provider, MD   Multiple Vitamins-Minerals (MULTI VITAMIN/MINERALS) TABS Take  by mouth. Yes Historical Provider, MD   lovastatin (MEVACOR) 10 MG tablet Take 1 tablet by mouth nightly. 1/31/12  Yes Heydi Corral MD   dicyclomine (BENTYL) 10 MG capsule Take 1 capsule by mouth 4 times daily (before meals and nightly). 1/31/12  Yes Heydi Corral MD   loratadine (CLARITIN) 10 MG tablet Take 10 mg by mouth daily.      Yes Historical Provider, MD     Additional information:       VITAL SIGNS   Vitals:    12/19/20 0725   BP: (!) 110/59   Pulse: 104   Resp: 16   Temp: 100.6 °F (38.1 °C)   SpO2: 98%       PHYSICAL:   General: No acute distress  HEENT:  Unremarkable for age  Neck: without increased JVD, carotid pulses 2+ bilaterally without bruits  Heart: RRR, S1, S2 obliterated, S4 gallop, 3/6 CASSI  NYHA: 4  Lungs: Clear to auscultation    Abdomen: BS present, without HSM, masses, or tenderness    Extremities: without C,C,E.  Pulses 2+ bilaterally  Mental Status: Sedated        PLANNED PROCEDURE   []Cath  []PCI                []Pacemaker/AICD  []VILMA             []Cardioversion []Peripheral angiography/PTA  [x]Other: TAVR     SEDATION  Planned agent:[x]Midazolam []Meperidine [x]Sublimaze []Morphine []Diazepam  []Other:     ASA Classification:  []1 []2 []3 [x]4 []5  Class 1: A normal healthy patient  Class 2: Pt with mild to moderate systemic disease  Class 3: Severe systemic disease or disturbance  Class 4: Severe systemic disorders that are already life threatening. Class 5: Moribund pt with little chances of survival, for more than 24 hours. Mallampati I Airway Classification:   []1 []2 []3 []4 -- intubated    [x]Pre-procedure diagnostic studies complete and results available. Comment:    [x]Previous sedation/anesthesia experiences assessed. Comment:    [x]The patient is an appropriate candidate to undergo the planned procedure sedation and anesthesia. (Refer to nursing sedation/analgesia documentation record)  [x]Formulation and discussion of sedation/procedure plan, risks, and expectations with patient and/or responsible adult completed. [x]Patient examined immediately prior to the procedure.  (Refer to nursing sedation/analgesia documentation record)    Malachi Valerio MD   Electronically signed 12/19/2020 at 8:03 AM

## 2020-12-19 NOTE — PROGRESS NOTES
[facility] Adult Cardiac Surgery Database Version 4.20 RISK SCORES Procedure: Isolated AVR CALCULATE   Risk of Mortality:  43.539%    Renal Failure:  9.281%    Permanent Stroke:  50.963%    Prolonged Ventilation:  66.708%    DSW Infection:  0.051%    Reoperation:  9.430%    Morbidity or Mortality:  83.993%    Short Length of Stay:  4.289%    Long Length of Stay:  40.676%

## 2020-12-19 NOTE — PROGRESS NOTES
2210 Pt bedrest period is up. Pt up to use toilet in room. Pt voided, washed hands and back to bed. 2218  Upon immediate return to the bed, pt states \"Whew, that just wears me out. \" Pt then proceeds to rub her chest, and states \"I'm feeling dizzy. . I'm not feeling good. \" Pt turns pale, HR in the 120's, RN cycles blood pressure, pt makes gagging sound, goes unresponsive. Asystole on monitor. Code blue called, compressions started immediately. Within 1-2 minutes, code team in room. See Code documentation.

## 2020-12-19 NOTE — PROGRESS NOTES
Interventional Cardiology Progress Note    Informed by 2E RN that the patient went to the bathroom, came back to her bed, and then proceeded to complain of chest pain, and sob, and then had an asystolic cardiac arrest.      2 minutes of ACLS and 1 Epi and she had ROSC    Patient was mentally intact per ICU NP. I then initiated heart team discussions with primary cardiologist, Dr. Monica Carlson, structural heart nurse, ICU NP, and myself. Based on her age, frailty, and comorbidities and now cardiac arrest, she is prohibitive risk for cardiac surgery. Possibilities include transfer, BAV, or TAVR. BAV has some degree of recoil and may not be a solution that benefits her as this would result in multiple large bore accesses without a definite solution. At this point, the only cause of her arrest is the AS, and thus all team members agreed that a definitive solution is the best option to prevent repeat arrest and to reduce her mortality risk. We will proceed with TAVR after further discussions with the family    Plan:  ICU care  Intubate for hypoxic CHF  Repeat all labs  TAVR CTA  I will size the valve and contact the valve company based on the anatomy  Hgb > 8  Avoid propofol  ECG now  Hold anti-hypertensives  Continue ASA  FULL CODE    Further recommendations based on results and clinical course    Pradip Hough MD  Interventional Cardiology

## 2020-12-19 NOTE — PROGRESS NOTES
Intensive Care Unit  Intensivist Progress Note    Patient: Rui Purdy  : 1931  MRN#: [id number]  2020 1:21 PM  ADMISSION DAY:  2020 12:56 PM     Subjective:    Patient is sedated, on mechanical ventilation. She has needed no dyssynchrony with the vent. TAVR today. INITIAL H AND P AND ICU COURSE:  Amy Dubose is a 77-year-old  female admitted to [address] on 2020 status post cardiac arrest.     Patient has a past medical history that is significant for lifetime non-smoker, essential hypertension, Bi-Ventricular heart failure-2020 ECHO LVEF 40 to 71%, grade 1 diastolic, severe AS with valve area of 0.5-0.6 moderate global hypokinesis of left ventricle, recent RHC and LHC 2020 LM-Ostial 10% ostial stenosis, anemia, normocytic IBS, right hand weakness secondary to atrophy of the muscle, dyslipidemia. Recent [facility] ER evaluation on 2020 with complaints of shortness of breath and chest pain. Patient was treated for acute on on chronic congestive heart failure associated with heart murmur. Prescription was given for Lasix and Potassium with last dose 2020. She was followed up as an outpatient with Dr. Ken Magdaleno in cardiology clinic on 2020 and 2020     Chris Cowan was admitted earlier on 2020 as an outpatient under the care of Dr. Ken Magdaleno for right and left-sided heart catheterization in preparation for TAVR procedure. 12/18/2020 2218 patient had walked to the restroom with complaints of chest pain and dizziness. Per documentation patient became unresponsive in asystole was noted on cardiac monitor. CODE BLUE was called with ROSC obtained after 2 minutes of CPR and 1 round of epi given. Patient was awake answering questions appropriately and following commands. Monitor Sinus Tachycardia with no acute changes noted on EKG. She became hypoxic with SaO2 74% while on 100% NRB Mask. Patient was emergently intubated and transferred to ICU for further management and care. Patient Vitals for the past 8 hrs:   BP Temp Temp src Pulse Resp SpO2 Weight   12/19/20 1239    94 16 100 %    12/19/20 1220 (!) 138/56 99.9 °F (37.7 °C) Bladder 96 18 100 %    12/19/20 0900 113/60   109 21 96 %    12/19/20 0857    106 16 97 %    12/19/20 0800 105/61   104 17 97 %    12/19/20 0725 (!) 110/59 100.6 °F (38.1 °C) Bladder 104 16 98 %    12/19/20 0700 (!) 103/58   103 16 98 %    12/19/20 0630 (!) 99/55   100 16 97 %    12/19/20 0600 (!) 107/58   101 16 98 %    12/19/20 0542    98 16 97 %    12/19/20 0530 (!) 75/51   99 16 98 %    12/19/20 0525       91 lb 0.8 oz (41.3 kg)       EXAM:  General:   Frail pale ill in appearance  HEENT:  normocephalic and atraumatic. No scleral icterus. PERR  Neck: supple. No Thyromegaly. Lungs: clear to auscultation, diminished in bases. No retractions  Cardiac: RRR-regular, harsh systolic 5/8 murmur, positive JVD. Abdomen: soft. Nontender, bowel sounds present  Extremities:  No clubbing, cyanosis, or edema x 4. Vasculature: capillary refill < 3 seconds. Palpable dorsalis pedis pulses. Skin:  warm and dry. Right groin dressing dry and intact,   Psych:  Alert and oriented x3. Affect appropriate  Lymph:  No supraclavicular adenopathy. Neurologic:  No focal deficit. No seizures    No intake or output data in the 24 hours ending 12/19/20 1321  No intake/output data recorded. Wt Readings from Last 3 Encounters:   12/19/20 91 lb 0.8 oz (41.3 kg)   12/11/20 121 lb 9.6 oz (55.2 kg)   11/30/20 119 lb 6.4 oz (54.2 kg)      Body mass index is 16.65 kg/m².          Scheduled Meds:   [START ON 12/20/2020] aspirin  81 mg Oral Daily    [START ON 12/20/2020] clopidogrel  75 mg Oral Daily    ceFAZolin  1 g Intravenous Q8H    aspirin  325 mg Oral Once    chlorhexidine  15 mL Mouth/Throat BID    famotidine (PEPCID) injection  20 mg Intravenous Daily    sodium chloride flush  10 mL Intravenous 2 times per day    [Held by provider] enoxaparin  40 mg Subcutaneous Daily     Continuous Infusions:   phenylephrine (DANIELLE-SYNEPHRINE) 50mg/250mL infusion 100 mcg/min (12/19/20 1306)    sodium chloride      midazolam 2 mg/hr (12/19/20 0700)    propofol Stopped (12/19/20 0745)     PRN Meds:    [START ON 12/20/2020] perflutren lipid microspheres, 1.5 mL, ONCE PRN      atropine, 0.5 mg, Once PRN      fentanNYL, 50 mcg, Q1H PRN      midazolam, 1 mg, Q6H PRN      hydrALAZINE, 10 mg, Q10 Min PRN      acetaminophen, 650 mg, Q4H PRN      fentanNYL, 25 mcg, Q1H PRN      sodium chloride flush, 10 mL, PRN      promethazine, 12.5 mg, Q6H PRN    Or      ondansetron, 4 mg, Q6H PRN      polyethylene glycol, 17 g, Daily PRN            NUTRITION SUPPORT:    SUPPORT DEVICES:    Oxygen Delivery - O2 Flow Rate (L/min): 10 L/min  VENT SETTINGS (Comprehensive)  Vent Information  $Ventilation: $Subsequent Day  Skin Assessment: Clean, dry, & intact  Equipment ID: [id number]  Vent Type: C2G5 Mirza  Vent Mode: PCV+  Vt Ordered: 0 mL  Pressure Ordered: 22(Pcontrol 22)  Rate Set: 16 bmp  Peak Flow: 32 L/min  FiO2 : 35 %  SpO2: 100 %  SpO2/FiO2 ratio: 285.71  Sensitivity: 3  PEEP/CPAP: 6  I Time/ I Time %: 1 s  Humidification Source: HME  Nitric Oxide/Epoprostenol In Use?: No  Additional Respiratory  Assessments  Pulse: 94  Resp: 16  SpO2: 100 %  Position: Semi-Celaya's  Humidification Source: E Oral Care: Mouth swabbed, Mouth moisturizer, Mouth suctioned, Suction toothette  Subglottic Suction Done?: Yes      DATA:    CBC:   Recent Labs     12/18/20  1320 12/18/20  2318   WBC 8.0 10.0   RBC 3.68* 3.47*   HGB 11.5* 11.3*   HCT 35.0* 34.3*   MCV 95.1 98.8   MCH 31.3 32.6   MCHC 32.9 32.9    126*   MPV 12.2 13.5*      BMP/CMP:   Recent Labs     12/18/20  1320 12/18/20  2318 12/19/20  0350    127* 138   K 4.1 3.8 4.0    96* 107   CO2 24 21* 22*   ANIONGAP 10.0 10.0 9.0   BUN 13 11 12   CREATININE 0.9 0.8 0.7   GLUCOSE 113* 135* 112*   CALCIUM 10.5 8.9 9.1   PROT  --  5.3*  --    LABALBU  --  2.8*  --    BILITOT  --  0.4  --    ALKPHOS  --  62  --    AST  --  80*  --    ALT  --  16  --       Other Electrolytes:   Recent Labs     12/18/20  2318   CAION 1.17   PHOS 3.4     Serum Osmolality  Recent Labs     12/19/20  0350   OSMOMEASER 292     Procalcitonin:  Recent Labs     12/19/20  0350   PROCAL 0.14*     Cardiac: No results for input(s): TROPONINT in the last 72 hours. Lipids: No results for input(s): CHOL, HDL in the last 72 hours. Invalid input(s): LDLCALCU  Coagulation:   Recent Labs     12/18/20  1320 12/18/20  2318   INR 0.99 1.06     Lactic Acid: No results for input(s): LACTA in the last 72 hours. ABGs:   No results found for: PH, PCO2, PO2, HCO3, O2SAT  No results found for: IFIO2, MODE, SETTIDVOL, SETPEEP    Radiology/Imaging:    Patient Active Problem List   Diagnosis    HTN (hypertension)    Hyperlipidemia    Osteoporosis    Allergic rhinitis    IBS (irritable bowel syndrome)    Osteoarthrosis involving multiple sites    Medication monitoring encounter    Postmenopausal bone loss    Anemia    Right hand weakness, atrophy of thumb muscle.     Chest pressure  on exertion    SOB (shortness of breath) on exertion    Chronic congestive heart failure (HCC)    Systolic ejection murmur 5/6 best aortic area    Pansystolic murmur- best tricuspid area 5/6  Severe calcific aortic stenosis    Cardiomyopathy (HCC)    S/P CATH - C AND RHC- LM-OSTAIL 10% STENOSIS, LAD-PATENT, LCX-P, RCA-P, ; RHC PAP 55/26 MEAN 38 MMHG,PCWP 26 MMHG,- TO BE SCHEDULED FOR TAVR  ASAP WITH DR. CARTAGENA    S/P right and left heart catheterization    Cardiac arrest ([facility]    Moderate malnutrition ([facility] [facility]       Assessment and Plan:    1. Acute respiratory failure,hypoxia: Status post cardiac arrest.  Patient was orally intubated and connected to PCV mode of ventilation continue with lung protection strategies targeting a peak pressure 35 and less and plateau 30 and less. We will assess for extubation after she is able to sit in bed. 2. Cardiac arrest: Bradycardia-asystole, with known severe AS this event was witnessed and occurred after activity of walking to bathroom. ROSC was obtained after 2 minutes CPR and epinephrine x1 was given. Patient was purposeful and oriented. Currently on no pressors to support blood pressure. TAVR today. 3. Severe AS: ECHO 12/2020 LVEF 40-45% Severe/Critical/AS with valve area of 0.5 to 0.6 sq cm. Valve gradient 125 mmHg. TAVR today. 4. Acute on chronic biventricular heart failure: in the setting of severe AS. 5. Nonobstructive CAD: 12/18 RHC/LHC  LM-Ostial 10% ostial stenosis, ASA was given, monitor. 6. Impaired glucose tolerance:  Monitor if greater than 150 will complete A1C and monitor with ACCU and SSI. 7. Hypoalbuminemia:  monitor  8. Anemia, normocytic:  Monitor and trend  9. Thrombocytopenia, mild:  No ecchymotic or bruising noted. Patient on ASA. Fibrinogen/FSP pending. Repeat and monitor.   Ccm:31      Malika Beard MD

## 2020-12-19 NOTE — FLOWSHEET NOTE
Pt is an 80y.o. female, originally in 2E-12.  responded to Code BLUE alert, arriving along with other team members and monitored the scene (staff). Chris Cowan was moved to 4D-11 shortly afterward.  interacted with Sybil's step-daughter Ever Conde, who had arrived to be with her.  provided active listening, gauged Tavia's feelings, thoughts and concerns regarding Sybil's plight, nurtured hope, engaged in conversation with Ever Conde and prayed as well.  also provided Ever Conde with an update on what led to Chris Cowan being in ICU, clarifying what she was told over the phone. Ever Conde expressed gratitude for our time together and was appreciative of the staff as they serve Chris Cowan. 12/18/20 8111   Encounter Summary   Services provided to: Family; Patient not available   Referral/Consult From: Nursing Supervisor/Manager   Support System Family members   Continue Visiting Yes  (12/18)   Complexity of Encounter High   Length of Encounter 1 hour   Crisis   Type Code   Assessment Anxious; Shock; Anticipatory grief  (assessment of family member)   Intervention Active listening;Explored feelings, thoughts, concerns;Prayer;Discussed illness/injury and it's impact;Nurtured hope   Outcome Acceptance;Expressed gratitude;Engaged in conversation;Receptive

## 2020-12-20 ENCOUNTER — TELEPHONE (OUTPATIENT)
Dept: CARDIOLOGY CLINIC | Age: 85
End: 2020-12-20

## 2020-12-20 LAB
ANION GAP SERPL CALCULATED.3IONS-SCNC: 8 MEQ/L (ref 8–16)
APTT: 28.1 SECONDS (ref 22–38)
BUN BLDV-MCNC: 18 MG/DL (ref 7–22)
CALCIUM SERPL-MCNC: 9.8 MG/DL (ref 8.5–10.5)
CHLORIDE BLD-SCNC: 102 MEQ/L (ref 98–111)
CO2: 25 MEQ/L (ref 23–33)
CREAT SERPL-MCNC: 0.8 MG/DL (ref 0.4–1.2)
EKG ATRIAL RATE: 101 BPM
EKG ATRIAL RATE: 106 BPM
EKG ATRIAL RATE: 121 BPM
EKG ATRIAL RATE: 89 BPM
EKG ATRIAL RATE: 94 BPM
EKG P AXIS: 48 DEGREES
EKG P AXIS: 50 DEGREES
EKG P AXIS: 67 DEGREES
EKG P AXIS: 73 DEGREES
EKG P AXIS: 80 DEGREES
EKG P-R INTERVAL: 134 MS
EKG P-R INTERVAL: 146 MS
EKG P-R INTERVAL: 172 MS
EKG P-R INTERVAL: 174 MS
EKG P-R INTERVAL: 178 MS
EKG Q-T INTERVAL: 298 MS
EKG Q-T INTERVAL: 334 MS
EKG Q-T INTERVAL: 378 MS
EKG Q-T INTERVAL: 384 MS
EKG Q-T INTERVAL: 386 MS
EKG QRS DURATION: 140 MS
EKG QRS DURATION: 140 MS
EKG QRS DURATION: 142 MS
EKG QRS DURATION: 86 MS
EKG QRS DURATION: 94 MS
EKG QTC CALCULATION (BAZETT): 423 MS
EKG QTC CALCULATION (BAZETT): 443 MS
EKG QTC CALCULATION (BAZETT): 469 MS
EKG QTC CALCULATION (BAZETT): 472 MS
EKG QTC CALCULATION (BAZETT): 497 MS
EKG R AXIS: -11 DEGREES
EKG R AXIS: -35 DEGREES
EKG R AXIS: -45 DEGREES
EKG R AXIS: 18 DEGREES
EKG R AXIS: 5 DEGREES
EKG T AXIS: 116 DEGREES
EKG T AXIS: 123 DEGREES
EKG T AXIS: 148 DEGREES
EKG T AXIS: 85 DEGREES
EKG T AXIS: 89 DEGREES
EKG VENTRICULAR RATE: 101 BPM
EKG VENTRICULAR RATE: 106 BPM
EKG VENTRICULAR RATE: 121 BPM
EKG VENTRICULAR RATE: 89 BPM
EKG VENTRICULAR RATE: 94 BPM
ERYTHROCYTE [DISTWIDTH] IN BLOOD BY AUTOMATED COUNT: 13.2 % (ref 11.5–14.5)
ERYTHROCYTE [DISTWIDTH] IN BLOOD BY AUTOMATED COUNT: 46.3 FL (ref 35–45)
GFR SERPL CREATININE-BSD FRML MDRD: 67 ML/MIN/1.73M2
GLUCOSE BLD-MCNC: 128 MG/DL (ref 70–108)
GLUCOSE BLD-MCNC: 159 MG/DL (ref 70–108)
HCT VFR BLD CALC: 32.8 % (ref 37–47)
HEMOGLOBIN: 10.7 GM/DL (ref 12–16)
INR BLD: 1.13 (ref 0.85–1.13)
MAGNESIUM: 1.8 MG/DL (ref 1.6–2.4)
MCH RBC QN AUTO: 31.5 PG (ref 26–33)
MCHC RBC AUTO-ENTMCNC: 32.6 GM/DL (ref 32.2–35.5)
MCV RBC AUTO: 96.5 FL (ref 81–99)
PLATELET # BLD: 131 THOU/MM3 (ref 130–400)
PMV BLD AUTO: 12.4 FL (ref 9.4–12.4)
POTASSIUM REFLEX MAGNESIUM: 3.4 MEQ/L (ref 3.5–5.2)
RBC # BLD: 3.4 MILL/MM3 (ref 4.2–5.4)
SODIUM BLD-SCNC: 135 MEQ/L (ref 135–145)
WBC # BLD: 8.6 THOU/MM3 (ref 4.8–10.8)

## 2020-12-20 PROCEDURE — 93005 ELECTROCARDIOGRAM TRACING: CPT | Performed by: INTERNAL MEDICINE

## 2020-12-20 PROCEDURE — 83735 ASSAY OF MAGNESIUM: CPT

## 2020-12-20 PROCEDURE — 93010 ELECTROCARDIOGRAM REPORT: CPT | Performed by: INTERNAL MEDICINE

## 2020-12-20 PROCEDURE — 82948 REAGENT STRIP/BLOOD GLUCOSE: CPT

## 2020-12-20 PROCEDURE — APPSS60 APP SPLIT SHARED TIME 46-60 MINUTES: Performed by: PHYSICIAN ASSISTANT

## 2020-12-20 PROCEDURE — 2580000003 HC RX 258: Performed by: NURSE PRACTITIONER

## 2020-12-20 PROCEDURE — 6370000000 HC RX 637 (ALT 250 FOR IP): Performed by: INTERNAL MEDICINE

## 2020-12-20 PROCEDURE — 85027 COMPLETE CBC AUTOMATED: CPT

## 2020-12-20 PROCEDURE — 36415 COLL VENOUS BLD VENIPUNCTURE: CPT

## 2020-12-20 PROCEDURE — 2140000000 HC CCU INTERMEDIATE R&B

## 2020-12-20 PROCEDURE — 80048 BASIC METABOLIC PNL TOTAL CA: CPT

## 2020-12-20 PROCEDURE — 85730 THROMBOPLASTIN TIME PARTIAL: CPT

## 2020-12-20 PROCEDURE — 6360000002 HC RX W HCPCS: Performed by: INTERNAL MEDICINE

## 2020-12-20 PROCEDURE — 85610 PROTHROMBIN TIME: CPT

## 2020-12-20 PROCEDURE — 94761 N-INVAS EAR/PLS OXIMETRY MLT: CPT

## 2020-12-20 PROCEDURE — 6360000002 HC RX W HCPCS: Performed by: NURSE PRACTITIONER

## 2020-12-20 RX ORDER — POTASSIUM CHLORIDE 20 MEQ/1
40 TABLET, EXTENDED RELEASE ORAL ONCE
Status: COMPLETED | OUTPATIENT
Start: 2020-12-20 | End: 2020-12-20

## 2020-12-20 RX ORDER — PHENTOLAMINE MESYLATE 5 MG/1
5 INJECTION INTRAMUSCULAR; INTRAVENOUS ONCE
Status: COMPLETED | OUTPATIENT
Start: 2020-12-20 | End: 2020-12-20

## 2020-12-20 RX ADMIN — METOPROLOL TARTRATE 25 MG: 25 TABLET ORAL at 21:51

## 2020-12-20 RX ADMIN — Medication 10 ML: at 21:52

## 2020-12-20 RX ADMIN — ASPIRIN 81 MG: 81 TABLET, COATED ORAL at 08:34

## 2020-12-20 RX ADMIN — Medication 10 ML: at 08:35

## 2020-12-20 RX ADMIN — ACETAMINOPHEN 650 MG: 325 TABLET ORAL at 23:20

## 2020-12-20 RX ADMIN — CEFAZOLIN SODIUM 1 G: 1 INJECTION, SOLUTION INTRAVENOUS at 06:42

## 2020-12-20 RX ADMIN — ACETAMINOPHEN 650 MG: 325 TABLET ORAL at 17:56

## 2020-12-20 RX ADMIN — METOPROLOL TARTRATE 25 MG: 25 TABLET ORAL at 08:34

## 2020-12-20 RX ADMIN — POTASSIUM CHLORIDE 40 MEQ: 1500 TABLET, EXTENDED RELEASE ORAL at 21:51

## 2020-12-20 RX ADMIN — CLOPIDOGREL BISULFATE 75 MG: 75 TABLET ORAL at 08:34

## 2020-12-20 RX ADMIN — PHENTOLAMINE MESYLATE 5 MG: 5 INJECTION, POWDER, FOR SOLUTION INTRAMUSCULAR; INTRAVENOUS at 07:24

## 2020-12-20 ASSESSMENT — PAIN SCALES - GENERAL
PAINLEVEL_OUTOF10: 0
PAINLEVEL_OUTOF10: 0
PAINLEVEL_OUTOF10: 2
PAINLEVEL_OUTOF10: 0
PAINLEVEL_OUTOF10: 1

## 2020-12-20 ASSESSMENT — PAIN DESCRIPTION - LOCATION: LOCATION: ABDOMEN

## 2020-12-20 ASSESSMENT — PAIN DESCRIPTION - DESCRIPTORS: DESCRIPTORS: ACHING

## 2020-12-20 ASSESSMENT — PAIN DESCRIPTION - ONSET: ONSET: ON-GOING

## 2020-12-20 ASSESSMENT — PAIN DESCRIPTION - FREQUENCY: FREQUENCY: CONTINUOUS

## 2020-12-20 ASSESSMENT — PAIN DESCRIPTION - ORIENTATION: ORIENTATION: LEFT

## 2020-12-20 ASSESSMENT — PAIN DESCRIPTION - PAIN TYPE: TYPE: ACUTE PAIN

## 2020-12-20 NOTE — PROCEDURES
[address] [id number]                            CARDIAC CATHETERIZATION    PATIENT NAME: Duke Ashraf                   :        1931  MED REC NO:   [id number]                           ROOM:       8112  ACCOUNT NO:   [de-identified]                           ADMIT DATE: 2020  PROVIDER:     Roger Meléndez MD    DATE OF PROCEDURE:  2020    PROCEDURE:  Transcatheter aortic valve replacement. INDICATION:  Cardiac arrest post coronary angiogram in the setting of  critical aortic stenosis; heart team approach was done over the tele  conference and myself, Dr. Jose Stover, Dr. Lucía Lopez, all agreed. This patient  had had a cardiac arrest and had ROSC with intact neurological status. Therefore, the best option at this time would be to proceed with  semi-emergent TAVR to prevent further concerns for cardiac arrest.  Her  aortic stenosis was critical with velocities over 5 m/sec which in  itself is a risk factor for sudden cardiac death, which the patient had  as a witnessed arrest while in the holding area after her cardiac  catheterization, though it was uncomplicated and did not demonstrate any  significant coronary artery disease. She had all other cardiac workup  done including dental evaluation other than the CT scanning for the  evaluation of the valve and access, which was done urgently overnight in  preparation for the TAVR after the heart team discussion. DESCRIPTION OF PROCEDURE:  After informed consent was obtained from the  patient and the patient's family, she was brought to the cardiac  catheterization laboratory and prepped in a sterile fashion. Bilateral  femoral arterial accesses and right internal jugular vein were chosen as  the primary points of access. Preprocedure timeout was completed.    After infiltration of the right neck with 2% lidocaine using micropuncture and modified Seldinger technique under fluoroscopic  guidance and ultrasound guidance, I was able to insert a 6-[place]ankan  locking sheath in the right internal jugular vein. I floated a 5-[place]  balloon-tipped pacemaker into the right ventricular apex. Pacing  thresholds were checked. Pacemaker capture lasted less than 1 mA  output. I then turned my attention to the left femoral artery. After  infiltration of the left inguinal region with 2% lidocaine using  micropuncture and modified Seldinger technique under fluoroscopic  guidance and ultrasound guidance, I was able to insert a 5-[place] long  sheath in the left femoral artery after angiography confirmed common  femoral artery puncture. After infiltration of the right inguinal  region with 2% lidocaine using micropuncture and modified Seldinger  technique under fluoroscopic guidance and ultrasound guidance, I was  able to insert a 4-[place] micropuncture sheath into the right femoral  artery. Angiography confirmed common femoral artery puncture. I  upsized the access to a 6-[place]an. I deployed two Perclose sutures in  orthogonal fashion leaving them incomplete. I then inserted a 14-[place]ankan  sheath over a 0.035 Supra Core wire. Heparin IV was given. ACT was  confirmed to be above 250 seconds. I then placed a pigtail in the  noncoronary cusp via the left femoral artery. Via the right femoral  artery using a 6-[place]ankan AL1 catheter and 0.035 straight-tipped  guidewire, I crossed the aortic valve without complication. I then  exchanged out for a 6-[place] angled pigtail, placed in the left  ventricular apex. Hemodynamics were transduced. Based on preprocedure  CTA measurements, an Evolut Pro Plus 26 valve was chosen and loaded per  [facility] recommendations. The loading was checked and deemed  appropriate on fluoroscopy. She had bicuspid nature of her valve that  was severely calcified.   The plan was to proceed with predilation with an 18 x 4.5 True dilation balloon that was prepped per 's  recommendations. I then exchanged out for a double-curved Lunderquist  wire, placed in the left ventricular apex. Once this was done, I  removed the pigtail catheter, inserted an 18 x 4.5 True dilation balloon  across the aortic annulus. I paced the patient at 180 paces per minute. Once the pulse pressure was narrow and blood pressure was low enough, we  rapidly inflated and deflated the balloon. Thereafter, the balloon was  removed. The 14-S[place] Arabian sheath was removed. I inserted the TAVR  prosthesis over the double-curved Lunderquist wire in a sheathless  fashion. I crossed the arch in Syrian projection. I lined up the TAVR  prosthesis with the native valve in CARMONA cusp-overlap view. I confirmed  the pigtail was in the bottom of the coronary cusp. I then slowly and  steadily began to release the valve. I paced the patient at 120 paces  per minute and ultimately to 140 paces per minute to reduce any ectopy. Once I released about 80% of the valve, angiography confirmed that I had  captured the noncoronary cusp appropriately. I then changed positions  to the Syrian three-cusp view. I removed the parallax from the TAVR  prosthesis. I took an angiogram at that point demonstrating that we had  appropriately landed the valve. Due to bicuspid nature of the valve,  there was some indenting of the TAVR prosthesis. So, I elected to leave  the double-curved Lunderquist wire in place. I prepped a 20 x 4.5 True  dilation balloon, removed the delivery system, recaptured the nose cone,  removed the delivery system completely from the patient, inserted the  14-S[place] Arabian sheath back into the patient. I then inserted the 20 x 4.5  True dilation balloon across the TAVR prosthesis. I roughly paced the  patient at 180 paces per minute.   Once the pulse pressure was narrow and  blood pressure was low enough, I reinflated and deflated the balloon to ensure maximal expansion. I then placed the pigtail catheter across the  valve demonstrating no LV to AO systolic gradient. No change in LVEDP. TTE was performed demonstrating no pericardial effusion, no significant  perivalvular leak, no significant LV to AO systolic gradient, no change  of the preserved LVEF and no change in mitral valve apparatus. Given  these findings, no further intervention was undertaken. I removed all  equipment from the patient. Both Perclose sutures were completed in the  right femoral artery. Left femoral artery was closed with a 6-[place]  Angio-Seal.  No conduction disturbances were noted at the end of the  case per the ECG. Therefore, we elected to remove the right internal  jugular venous pacemaker. IV protamine was given. The patient was  neurologically intact at the end of the case. IMMEDIATE COMPLICATIONS:  None. MEDICATIONS:  See EMR. ACCESS:  See above. ESTIMATED BLOOD LOSS:  Less than 50 mL. SUMMARY:  Successful percutaneous transfemoral transcatheter aortic  valve replacement with an Evolut Pro Plus 26, facilitated by 18 x 4.5  True dilation balloon aortic valvuloplasty and optimized with a 20 x 4.5  True dilation aortic valvuloplasty. PLAN:  1. Bedrest.  2.  Optimal medical therapy. 3.  Risk factor management. 4.  Routine access site care. 5.  Leave her intubated until the groin rest is completed. 6.  Check chest x-ray and labs prior to extubation. 7.  If the patient is neurologically intact, she may be extubated later  today. 8.  DAPT. 9.  Judicious diuresis. 10.  Cardiac rehab. 11.  TTE 24 to 48 hours. 12.  Postprocedure antibiotics. 13.  Follow up with valve clinic in one week. All the above was explained to the patient and the patient's family. They are agreeable and amenable to the above plan.         Raymond Claude, MD    D: 12/20/2020 13:41:58       T: 12/20/2020 14:43:27     [email or web address]  Job#: [id number]     Doc#: [id number] CC:

## 2020-12-20 NOTE — PROGRESS NOTES
Structural Heart/Cardiology Progress Note    2020 7:59 AM    Procedure:  TAVR        POD #:  1    Subjective: The pt is resting comfortably in bed on NC, alert, and in no acute distress. Hemodynamically stable. NSR heart rate 87. She underwent emergent TAVR yesterday due to cardiac arrest. All pressors are off. She is clinically much improved. She had hussein infiltration on left arm. Regitine was administered. Site improving per patient and RN. The pt denies chest pressure, SOB, palpitations, fever, chills, N/V/D.      Vital Signs: BP (!) 132/46   Pulse 86   Temp 99.3 °F (37.4 °C)   Resp 21   Ht 5' 2\" (1.575 m)   Wt 91 lb 0.8 oz (41.3 kg)   SpO2 97%   BMI 16.65 kg/m²    Temp (24hrs), Av.1 °F (37.8 °C), Min:99.3 °F (37.4 °C), Max:100.9 °F (38.3 °C)      Weight: 91 lb 0.8 oz (41.3 kg)       PULSE OXIMETRY RANGE: SpO2  Av.8 %  Min: 92 %  Max: 100 %  SUPPLEMENTAL O2: O2 Flow Rate (L/min): 2 L/min     Labs:   CBC:     Recent Labs     20  1320 208 20  1841   WBC 8.0 10.0 9.1   HGB 11.5* 11.3* 10.7*   HCT 35.0* 34.3* 31.4*   MCV 95.1 98.8 91.3    126* 111*     BMP:  Recent Labs     20  2318 20  0350 20  1841   * 138 141   K 3.8 4.0 3.5   CL 96* 107 107   CO2 21* 22* 22*   PHOS 3.4  --   --    BUN 11 12 12   CREATININE 0.8 0.7 0.8     Last HgA1C:  No results found for: LABA1C  PT/INR:   Recent Labs     20  1320 20   INR 0.99 1.06     APTT:   Recent Labs     20  1320 20   APTT 28.3 26.1         Intake/Output Summary (Last 24 hours) at 2020 0759  Last data filed at 2020 0500  Gross per 24 hour   Intake 2560 ml   Output 1720 ml   Net 840 ml       Scheduled Meds:    aspirin  81 mg Oral Daily    clopidogrel  75 mg Oral Daily    metoprolol tartrate  25 mg Oral BID    aspirin  325 mg Oral Once    sodium chloride flush  10 mL Intravenous 2 times per day  [Held by provider] enoxaparin  40 mg Subcutaneous Daily       ROS: All neg unless specifically mentioned in subjective section. Exam:   General Appearance: alert ,conversing, in no acute distress  Cardiovascular: normalrate, regular rhythm, normal S1 and S2, no murmurs, rubs, clicks, or gallops  Pulmonary/Chest: clear to auscultation bilaterally- no wheezes, rales or rhonchi, normal air movement, no respiratory distress  Abdomen:soft, non-tender, non-distended, normal bowel sounds, no bruits,   Extremities: no cyanosis, clubbing or edema. Pulses: Bilateral radial, femoral, DP, and PT pulses intact. No femoral bruits noted. Skin: warm and dry, no rash or erythema  Head: normocephalic and atraumatic  Neck: supple and non-tender without mass, no thyromegaly, no JVD   Musculoskeletal: normal range of motion, no joint swelling, deformity or tenderness. Neurological: alert, oriented, normal speech, no focal findings or movement disorder noted  Groin: Wounds healing appropriately. No signs of infection or hematoma. Assessment:   Patient Active Problem List   Diagnosis    HTN (hypertension)    Hyperlipidemia    Osteoporosis    Allergic rhinitis    IBS (irritable bowel syndrome)    Osteoarthrosis involving multiple sites    Medication monitoring encounter    Postmenopausal bone loss    Anemia    Right hand weakness, atrophy of thumb muscle.  Chest pressure  on exertion    SOB (shortness of breath) on exertion    Chronic congestive heart failure (HCC)    Systolic ejection murmur 5/6 best aortic area    Pansystolic murmur- best tricuspid area 5/6    Severe calcific aortic stenosis    Cardiomyopathy ([address]    S/P CATH - LHC AND RHC- LM-OSTAIL 10% STENOSIS, LAD-PATENT, LCX-P, RCA-P, ; RHC PAP 55/26 MEAN 38 MMHG,PCWP 26 MMHG,- TO BE SCHEDULED FOR TAVR  ASAP WITH DR. CARTAGENA    S/P right and left heart catheterization    Cardiac arrest ([address]    Moderate malnutrition ([address] S/p TAVR for aortic stenosis    Plan:   1. Continue DAPT  2. Echo this AM  3. Continue to closely monitor left forearm where hussein infiltration is  4. Continue current medical regimen  5.  Transfer to     The plan of care was discussed in detail with Dr. Maude Bo and Dr. Aubree Orozco

## 2020-12-20 NOTE — PROGRESS NOTES
Patient called out stating that they felt 'whoozy and a little off'. Vitals obtained, see flowsheet. Lung sounds clear, bilateral groin sites soft/dry/intact. Patient also stated they felt a little 'clammy and sweaty'. Blood glucose check: 159  EKG ordered and obtained. New T wave inversions noted in aVL, V5, V6 from previous EKG. Call placed to Dr. Waqar Spencer with update who stated to continue to monitor at this time.

## 2020-12-20 NOTE — OP NOTE
DATE: 12/20/20    PATIENT: Alena Asa    MRN: [id number]     Acct: [de-identified]    PREOP DIAGNOSIS:   Severe aortic stenosis    Postop diagnosis:  Severe aortic stenosis    Procedure:  Transcatheter aortic valve replacement with a Medtronic 26 mm Evolut Pro+ prosthesis    OPERATORS:  Sha Carias MD; Damaris Quinonez MD    ESTIMATED BLOOD LOSS: 50 ml    A pre-procedure discussion was conducted with the patient to confirm/exclude candidacy for open surgical salvage in case of procedure failure. After informed consent was obtained from the patient, the patient was brought to the hybrid operating room and prepped in sterile fashion. Infiltration of the bilateral inguinal regions was accomplished with 2% lidocaine. Using micropuncture and modified Seldinger technique, a 6 Fr sheath was inserted into the right internal jugular vein. A 5 Fr pacemaker was inserted into position under fluoroscopic guidance into the right ventricle, with verification of appropriate pacing thresholds. Using the same technique under fluoroscopic guidance, a 6 Fr sheath was inserted into the left common femoral artery. Similarly, a 4 Fr sheath, followed after contrast verification by a 5 Fr sheath was inserted into the right common femoral artery. This was replaced with a 6 Fr J4 catheter over a guidewire, and a Supra Core wire was used to traverse the aortic arch. We then performed standard preclose technique by deploying two Percloses in orthogonal fashion and leaving them incomplete. A 14 Fr sheath was inserted over the wire under guidance. The patient was heparinized to an ACT above 250 seconds. A straight pigtail was placed via the left femoral artery and aortography was performed in a coplanar view to ensure alignment. The aortic valve was crossed using a straight wire through an AL1 catheter. The guidewire was changed to a J-tipped wire, on which was inserted an angled pigtail. Direct hemodynamic measurements were obtained. The valve was then checked for appropriate loading on fluoroscopy. A Lunderquist wire was inserted into the angled pigtail, with proper final positioning in the LV apex. We proceeded with pre-implant balloon dilatation, using a 18 x 4.5 True balloon, inflated under rapid ventricular pacing. With manual pressure controlling the cannulation site, the sheath was removed, and the transcatheter valve was advanced on the wire. Under fluoroscopic guidance, the transcatheter valve was advanced around the arch without difficulty, and positioned appropriately through the annulus under a coplanar view. Adjustment was made to remove parallax. Slow valve deployment was initiated, with angiographic verification of proper position. The valve released without difficulty. Post-implant balloon dilatation was performed in similar fashion with a 20 x 4.5 True balloon. Echocardiography was done thereafter, demonstrating that there was no significant paravalvular leak, that the ejection fraction was unchanged, there was no regional wall motion abnormality, no pericardial effusion, and there was no change in mitral regurgitation. Subsequent hemodynamics confirmed no significant pressure gradient between the LV and the AO, and no change in the left ventricular end-diastolic pressure. All equipment was removed from the patient. Protamine 50 mg was administered. We then proceeded with closure of the right groin by closing the Percloses in sequential fashion over a 0.035 guidewire. The contralateral femoral artery was closed with a 6 Fr Angioseal after angiography confirmed no vascular injury. Neurologic evaluation post the procedure was completely intact with a good motor sensation and mental status. IMMEDIATE COMPLICATIONS:  None. MEDICATIONS:  See EMR. SUMMARY:  Successful transcatheter aortic valve replacement with a 26 mm Evolut Pro+ valve via transfemoral approach.

## 2020-12-20 NOTE — PROGRESS NOTES
Patient has been successfully weaned from Mechanical Ventilation. RSBI before extubation was 50  and SpO2 of 98 on 30% FiO2. Patient extubated and placed on 2 liters/min via nasal cannula. Post extubation SpO2 is 93% with HR  103 bpm and RR 20 breaths/min. Patient had moderate cough that was productive of tan and tenacious sputum. Extubation Well tolerated by patient. [place]

## 2020-12-20 NOTE — PROGRESS NOTES
Patient arrived to [facility] via transport. Two skin assessment performed by this RN and Kai Timmons. Left forearm infiltration site assessed and ice pack applied and floated on pillows at this time. Site marked with marker to see any changes. Bilateral groin sites soft/clean/dry at this time and bilateral pulses palpable and warm.

## 2020-12-20 NOTE — FLOWSHEET NOTE
Report called to Genet Westfall on 3B. Pt transported to 3B27 in stable condition via transport. Pt's daughter Fide Singh called and updated. Fide Singh has patient's purse, all other belongings packed and transported with patient.

## 2020-12-20 NOTE — PROGRESS NOTES
0600, Entered patient's Room to preform hourly round and 2 hour IV assessment. Noticed that The patient's IV site seemed Infiltrated and Swollen. This RN then notified the Intensivist, Rocio Dela Cruz NP, about the Site. Ryan Then Assessed the Site and Ordered Regitine 5mg. Iv was D/c and Pulled by this RN. Ice was applied to the Site, and Arm was Elevated by Pillows, Patient did not complain of Any pain at the Site.    0724: This RN Gave Regitine Via SQ to the Infiltrated site. No Complications noted. The patient did not complain of any additional Pain.

## 2020-12-21 ENCOUNTER — APPOINTMENT (OUTPATIENT)
Dept: GENERAL RADIOLOGY | Age: 85
DRG: 266 | End: 2020-12-21
Attending: INTERNAL MEDICINE
Payer: MEDICARE

## 2020-12-21 LAB
ANION GAP SERPL CALCULATED.3IONS-SCNC: 10 MEQ/L (ref 8–16)
BUN BLDV-MCNC: 14 MG/DL (ref 7–22)
CALCIUM SERPL-MCNC: 10.1 MG/DL (ref 8.5–10.5)
CHLORIDE BLD-SCNC: 104 MEQ/L (ref 98–111)
CO2: 25 MEQ/L (ref 23–33)
CREAT SERPL-MCNC: 0.6 MG/DL (ref 0.4–1.2)
EKG ATRIAL RATE: 84 BPM
EKG P AXIS: 39 DEGREES
EKG P-R INTERVAL: 158 MS
EKG Q-T INTERVAL: 374 MS
EKG QRS DURATION: 140 MS
EKG QTC CALCULATION (BAZETT): 441 MS
EKG R AXIS: -11 DEGREES
EKG T AXIS: 110 DEGREES
EKG VENTRICULAR RATE: 84 BPM
ERYTHROCYTE [DISTWIDTH] IN BLOOD BY AUTOMATED COUNT: 13.2 % (ref 11.5–14.5)
ERYTHROCYTE [DISTWIDTH] IN BLOOD BY AUTOMATED COUNT: 46.2 FL (ref 35–45)
GFR SERPL CREATININE-BSD FRML MDRD: > 90 ML/MIN/1.73M2
GLUCOSE BLD-MCNC: 106 MG/DL (ref 70–108)
GRAM STAIN RESULT: NORMAL
HCT VFR BLD CALC: 32 % (ref 37–47)
HEMOGLOBIN: 10.5 GM/DL (ref 12–16)
LV EF: 40 %
LVEF MODALITY: NORMAL
MCH RBC QN AUTO: 31.4 PG (ref 26–33)
MCHC RBC AUTO-ENTMCNC: 32.8 GM/DL (ref 32.2–35.5)
MCV RBC AUTO: 95.8 FL (ref 81–99)
MRSA SCREEN: NORMAL
PLATELET # BLD: 140 THOU/MM3 (ref 130–400)
PMV BLD AUTO: 12.3 FL (ref 9.4–12.4)
POTASSIUM REFLEX MAGNESIUM: 3.7 MEQ/L (ref 3.5–5.2)
RBC # BLD: 3.34 MILL/MM3 (ref 4.2–5.4)
RESPIRATORY CULTURE: NORMAL
SODIUM BLD-SCNC: 139 MEQ/L (ref 135–145)
URINE CULTURE, ROUTINE: NORMAL
WBC # BLD: 8.2 THOU/MM3 (ref 4.8–10.8)

## 2020-12-21 PROCEDURE — 80048 BASIC METABOLIC PNL TOTAL CA: CPT

## 2020-12-21 PROCEDURE — 97535 SELF CARE MNGMENT TRAINING: CPT

## 2020-12-21 PROCEDURE — 93005 ELECTROCARDIOGRAM TRACING: CPT | Performed by: INTERNAL MEDICINE

## 2020-12-21 PROCEDURE — APPSS30 APP SPLIT SHARED TIME 16-30 MINUTES: Performed by: PHYSICIAN ASSISTANT

## 2020-12-21 PROCEDURE — 2140000000 HC CCU INTERMEDIATE R&B

## 2020-12-21 PROCEDURE — 97166 OT EVAL MOD COMPLEX 45 MIN: CPT

## 2020-12-21 PROCEDURE — 97116 GAIT TRAINING THERAPY: CPT

## 2020-12-21 PROCEDURE — 97129 THER IVNTJ 1ST 15 MIN: CPT

## 2020-12-21 PROCEDURE — 93306 TTE W/DOPPLER COMPLETE: CPT

## 2020-12-21 PROCEDURE — 36415 COLL VENOUS BLD VENIPUNCTURE: CPT

## 2020-12-21 PROCEDURE — 92523 SPEECH SOUND LANG COMPREHEN: CPT

## 2020-12-21 PROCEDURE — 2580000003 HC RX 258: Performed by: NURSE PRACTITIONER

## 2020-12-21 PROCEDURE — 93010 ELECTROCARDIOGRAM REPORT: CPT | Performed by: NUCLEAR MEDICINE

## 2020-12-21 PROCEDURE — 6370000000 HC RX 637 (ALT 250 FOR IP): Performed by: INTERNAL MEDICINE

## 2020-12-21 PROCEDURE — 97110 THERAPEUTIC EXERCISES: CPT

## 2020-12-21 PROCEDURE — 2580000003 HC RX 258: Performed by: PHYSICIAN ASSISTANT

## 2020-12-21 PROCEDURE — 71045 X-RAY EXAM CHEST 1 VIEW: CPT

## 2020-12-21 PROCEDURE — 6370000000 HC RX 637 (ALT 250 FOR IP): Performed by: PHYSICIAN ASSISTANT

## 2020-12-21 PROCEDURE — 97162 PT EVAL MOD COMPLEX 30 MIN: CPT

## 2020-12-21 PROCEDURE — 99223 1ST HOSP IP/OBS HIGH 75: CPT | Performed by: NURSE PRACTITIONER

## 2020-12-21 PROCEDURE — 85027 COMPLETE CBC AUTOMATED: CPT

## 2020-12-21 RX ORDER — POTASSIUM CHLORIDE 7.45 MG/ML
10 INJECTION INTRAVENOUS PRN
Status: DISCONTINUED | OUTPATIENT
Start: 2020-12-21 | End: 2020-12-24 | Stop reason: HOSPADM

## 2020-12-21 RX ORDER — ACETAMINOPHEN 500 MG
500 TABLET ORAL EVERY 6 HOURS
Status: DISCONTINUED | OUTPATIENT
Start: 2020-12-21 | End: 2020-12-24 | Stop reason: HOSPADM

## 2020-12-21 RX ORDER — ATORVASTATIN CALCIUM 10 MG/1
10 TABLET, FILM COATED ORAL NIGHTLY
Refills: 3 | Status: DISCONTINUED | OUTPATIENT
Start: 2020-12-21 | End: 2020-12-24 | Stop reason: HOSPADM

## 2020-12-21 RX ORDER — POTASSIUM CHLORIDE 20 MEQ/1
40 TABLET, EXTENDED RELEASE ORAL PRN
Status: DISCONTINUED | OUTPATIENT
Start: 2020-12-21 | End: 2020-12-24 | Stop reason: HOSPADM

## 2020-12-21 RX ORDER — M-VIT,TX,IRON,MINS/CALC/FOLIC 27MG-0.4MG
1 TABLET ORAL DAILY
Status: DISCONTINUED | OUTPATIENT
Start: 2020-12-21 | End: 2020-12-24 | Stop reason: HOSPADM

## 2020-12-21 RX ORDER — DICYCLOMINE HYDROCHLORIDE 10 MG/1
10 CAPSULE ORAL 2 TIMES DAILY
Status: DISCONTINUED | OUTPATIENT
Start: 2020-12-21 | End: 2020-12-24 | Stop reason: HOSPADM

## 2020-12-21 RX ORDER — POTASSIUM CHLORIDE 750 MG/1
20 TABLET, FILM COATED, EXTENDED RELEASE ORAL ONCE
Status: COMPLETED | OUTPATIENT
Start: 2020-12-21 | End: 2020-12-21

## 2020-12-21 RX ORDER — SODIUM CHLORIDE 0.9 % (FLUSH) 0.9 %
10 SYRINGE (ML) INJECTION 2 TIMES DAILY
Status: DISCONTINUED | OUTPATIENT
Start: 2020-12-21 | End: 2020-12-24 | Stop reason: HOSPADM

## 2020-12-21 RX ADMIN — DICYCLOMINE HYDROCHLORIDE 10 MG: 10 CAPSULE ORAL at 20:18

## 2020-12-21 RX ADMIN — Medication 10 ML: at 10:42

## 2020-12-21 RX ADMIN — CLOPIDOGREL BISULFATE 75 MG: 75 TABLET ORAL at 10:42

## 2020-12-21 RX ADMIN — ACETAMINOPHEN 500 MG: 500 TABLET ORAL at 10:42

## 2020-12-21 RX ADMIN — ASPIRIN 81 MG: 81 TABLET, COATED ORAL at 10:42

## 2020-12-21 RX ADMIN — ACETAMINOPHEN 500 MG: 500 TABLET ORAL at 14:36

## 2020-12-21 RX ADMIN — Medication 10 ML: at 20:18

## 2020-12-21 RX ADMIN — DICYCLOMINE HYDROCHLORIDE 10 MG: 10 CAPSULE ORAL at 14:35

## 2020-12-21 RX ADMIN — METOPROLOL TARTRATE 25 MG: 25 TABLET ORAL at 10:43

## 2020-12-21 RX ADMIN — ATORVASTATIN CALCIUM 10 MG: 10 TABLET, FILM COATED ORAL at 20:18

## 2020-12-21 RX ADMIN — METOPROLOL TARTRATE 25 MG: 25 TABLET ORAL at 20:18

## 2020-12-21 RX ADMIN — POTASSIUM CHLORIDE 20 MEQ: 750 TABLET, EXTENDED RELEASE ORAL at 11:07

## 2020-12-21 RX ADMIN — Medication 10 ML: at 10:46

## 2020-12-21 RX ADMIN — ACETAMINOPHEN 500 MG: 500 TABLET ORAL at 20:18

## 2020-12-21 RX ADMIN — MULTIPLE VITAMINS W/ MINERALS TAB 1 TABLET: TAB at 14:35

## 2020-12-21 ASSESSMENT — PAIN - FUNCTIONAL ASSESSMENT: PAIN_FUNCTIONAL_ASSESSMENT: ACTIVITIES ARE NOT PREVENTED

## 2020-12-21 ASSESSMENT — PAIN DESCRIPTION - DESCRIPTORS: DESCRIPTORS: ACHING

## 2020-12-21 ASSESSMENT — PAIN DESCRIPTION - LOCATION: LOCATION: GENERALIZED

## 2020-12-21 ASSESSMENT — PAIN DESCRIPTION - FREQUENCY: FREQUENCY: CONTINUOUS

## 2020-12-21 ASSESSMENT — PAIN DESCRIPTION - PAIN TYPE: TYPE: ACUTE PAIN

## 2020-12-21 ASSESSMENT — PAIN SCALES - GENERAL
PAINLEVEL_OUTOF10: 8
PAINLEVEL_OUTOF10: 0
PAINLEVEL_OUTOF10: 8
PAINLEVEL_OUTOF10: 0
PAINLEVEL_OUTOF10: 8

## 2020-12-21 ASSESSMENT — PAIN DESCRIPTION - ONSET: ONSET: ON-GOING

## 2020-12-21 ASSESSMENT — PAIN DESCRIPTION - ORIENTATION: ORIENTATION: LEFT

## 2020-12-21 ASSESSMENT — PAIN DESCRIPTION - PROGRESSION: CLINICAL_PROGRESSION: NOT CHANGED

## 2020-12-21 NOTE — PLAN OF CARE
Problem: Pain:  Goal: Pain level will decrease  Description: Pain level will decrease  12/21/2020 1448 by Sharla Teran RN  Outcome: Ongoing  Note: Ongoing assessment & interventions provided throughout shift. Reminded patient to report any pain, pressure, or shortness of breath to the nurse. Pain medications provided per physician's orders. 500 mg Tylenol given per orders     Problem: Pain:  Goal: Control of acute pain  Description: Control of acute pain  12/21/2020 1448 by Sharla Teran RN  Outcome: Ongoing     Problem: Pain:  Goal: Control of chronic pain  Description: Control of chronic pain  12/21/2020 1448 by Sharla Teran RN  Outcome: Ongoing     Problem: DISCHARGE BARRIERS  Goal: Patient's continuum of care needs are met  12/21/2020 1448 by Sharla Teran RN  Outcome: Ongoing  Note: She is from home alone. Problem: Falls - Risk of:  Goal: Will remain free from falls  Description: Will remain free from falls  12/21/2020 1448 by Sharla Teran RN  Outcome: Ongoing  Note: Assessment & interventions provided throughout shift. Bed locked & in low position, call light in reach, side-rails up x2, bed/chair alarm utilized, non-slip socks on when ambulating, reminded patient to use call light to call for assistance. Problem: Falls - Risk of:  Goal: Absence of physical injury  Description: Absence of physical injury  12/21/2020 1448 by Sharla Teran RN  Outcome: Ongoing    Care plan reviewed with patient. Patient verbalizes understanding of the care plan and contributed to goal setting.

## 2020-12-21 NOTE — CONSULTS
Physical Medicine & Rehabilitation   Consult Note      Admitting Physician: Lavinia Stewart MD    Primary Care Provider: Josephine Herrmann MD     Reason for Consult:  TAVR debility. Rehab needs    History of Present Illness:  Zina Crisostomo is a 80 y.o. female admitted to [facility] on 12/18/2020. Patient presented to hospital for outpatient heart cath with DR Martinez. Patient had proceeded up to bathroom after cath and complained of CP and dizziness. Patient became unresponsive and asystole, CPR for 2 minutes given and patient responded, but with O2 sat 74% with 100% NRB, patient was intubated and taking to ICU. Patient was taken for TAVR emergently on 12/19/2020. Patient seen today, up in chair. [employer] for 80 year of age. Patient continues with O2. Patient was very independent prior and feel she is appropriate for IPR for ongoing therapy and medical care in order to transition home safely. Patient would like this option over ECF due to her recent CODE. Current Rehabilitation Assessments:  PT:   Range of Motion:  Bilateral Lower Extremity: WFL  Strength:  Bilateral Lower Extremity: 4-/5   Balance:  Static Sitting Balance:  Stand By Assistance  Static Standing Balance: Stand By Assistance  Dynamic Standing Balance: Contact Guard Assistance  Bed Mobility:  Supine to Sit: Minimal Assistance, X 1  Scooting: Contact Guard Assistance  Transfers:  Sit to Stand: Contact Guard Assistance, X 1, with increased time for completion, cues for hand placement, to/from chair with arms  Stand to Sit:Contact Guard Assistance, X 1, with increased time for completion, cues for hand placement, to/from chair with arms  Ambulation:  Contact Guard Assistance  Distance: 10 feet X 1, 40 feet X 1  Surface: Level Tile  Device:Rolling Walker  Gait Deviations:   Forward Flexed Posture, Slow O[place], Decreased Step Length Bilaterally, Decreased Gait Speed and Decreased Heel Strike Bilaterally     OT: Functional Mobility:  Functional Mobility  Functional - Mobility Device: No device  Activity: To/from bathroom  Assist Level: Contact guard assistance  Functional Mobility Comments: unsteadiness noted, Pt reaching out for objects to stabilize self   Balance:  Balance  Sitting Balance: Stand by assistance  Standing Balance: Stand by assistance(static)  Transfers:  Sit to stand: Contact guard assistance  Stand to sit: Stand by assistance  Upper Extremity Assessment:   LUE AROM : WFL  RUE AROM : WFL  LUE Strength  Gross LUE Strength: WFL  RUE Strength  Gross RUE Strength: WFL  Sensation  Overall Sensation Status: WFL    ST:    COGNITION:  [place] Cognitive Assessment (MOCA) version 7.2 completed. Pt scored 19/30. Normal is greater than or equal to 26/30. Orientation: 6/6  Immediate Recall: 3/5  Short-Term Recall: 0/5  Divergent Namin items named in 52 second time frame  Problem Solvin/3  Reasonin/2 verbal, 0/2 visuospatial  Sequencin/1  Thought Organization: Impaired, higher level deficits  Insight: Adequate, however, unsure of functional implications  Attention: Impaired sustained attention with concerns for higher level deficits  Math Computation: 1/3 serial subtraction   Executive Functionin/3 clock drawing         Past Medical History:        Diagnosis Date    Hyperlipidemia     Hypertension     Osteoporosis     Pneumonia        Past Surgical History:        Procedure Laterality Date    CARPAL TUNNEL RELEASE Right     ELBOW SURGERY      2013 right elbow    TONSILLECTOMY         Allergies:     Allergies   Allergen Reactions    Tomato Hives        Current Medications:   Current Facility-Administered Medications: potassium (CARDIAC) replacement protocol, , Other, RX Placeholder  acetaminophen (TYLENOL) tablet 500 mg, 500 mg, Oral, Q6H potassium chloride (KLOR-CON M) extended release tablet 40 mEq, 40 mEq, Oral, PRN **OR** potassium bicarb-citric acid (EFFER-K) effervescent tablet 40 mEq, 40 mEq, Oral, PRN **OR** potassium chloride 10 mEq/100 mL IVPB (Peripheral Line), 10 mEq, Intravenous, PRN  sodium chloride flush 0.9 % injection 10 mL, 10 mL, Intravenous, BID  dicyclomine (BENTYL) capsule 10 mg, 10 mg, Oral, BID  atorvastatin (LIPITOR) tablet 10 mg, 10 mg, Oral, Nightly  therapeutic multivitamin-minerals 1 tablet, 1 tablet, Oral, Daily  perflutren lipid microspheres (DEFINITY) injection 1.65 mg, 1.5 mL, Intravenous, ONCE PRN  fentaNYL (SUBLIMAZE) injection 50 mcg, 50 mcg, Intravenous, Q1H PRN  midazolam PF (VERSED) injection 1 mg, 1 mg, Intravenous, Q6H PRN  hydrALAZINE (APRESOLINE) injection 10 mg, 10 mg, Intravenous, Q10 Min PRN  aspirin EC tablet 81 mg, 81 mg, Oral, Daily  clopidogrel (PLAVIX) tablet 75 mg, 75 mg, Oral, Daily  metoprolol tartrate (LOPRESSOR) tablet 25 mg, 25 mg, Oral, BID  aspirin tablet 325 mg, 325 mg, Oral, Once  acetaminophen (TYLENOL) tablet 650 mg, 650 mg, Oral, Q4H PRN  sodium chloride flush 0.9 % injection 10 mL, 10 mL, Intravenous, 2 times per day  sodium chloride flush 0.9 % injection 10 mL, 10 mL, Intravenous, PRN  [Held by provider] enoxaparin (LOVENOX) injection 40 mg, 40 mg, Subcutaneous, Daily  promethazine (PHENERGAN) tablet 12.5 mg, 12.5 mg, Oral, Q6H PRN **OR** ondansetron (ZOFRAN) injection 4 mg, 4 mg, Intravenous, Q6H PRN  polyethylene glycol (GLYCOLAX) packet 17 g, 17 g, Oral, Daily PRN    Social History:  Social History     Socioeconomic History    Marital status:       Spouse name: Not on file    Number of children: Not on file    Years of education: Not on file    Highest education level: Not on file   Occupational History    Not on file   Social Needs    Financial resource strain: Not on file    Food insecurity     Worry: Not on file     Inability: Not on file   InToTally needs Medical: Not on file     Non-medical: Not on file   Tobacco Use    Smoking status: Never Smoker    Smokeless tobacco: Never Used   Substance and Sexual Activity    Alcohol use: No     Comment: rare    Drug use: No    Sexual activity: Not on file   Lifestyle    Physical activity     Days per week: Not on file     Minutes per session: Not on file    Stress: Not on file   Relationships    Social connections     Talks on phone: Not on file     Gets together: Not on file     Attends Catholic service: Not on file     Active member of club or organization: Not on file     Attends meetings of clubs or organizations: Not on file     Relationship status: Not on file    Intimate partner violence     Fear of current or ex partner: Not on file     Emotionally abused: Not on file     Physically abused: Not on file     Forced sexual activity: Not on file   Other Topics Concern    Not on file   Social History Narrative    Not on file     SOCIAL HISTORY:   Living Arrangements: Abbi, independently; x2 step-children in the immediate area to provide supplemental assistance should be required  Work History: Retired  Education Level: Master's degree, special education   Driving Status: Active   Finance Management: Independent; checkbook  Medication Management: Independent; pillbox  ADL's: Independent. Hobbies: Mowing (zero turn ), watching movies  Vision Status: Glasses  Hearing: WFL  Type of Home: House  Home Layout: One level  Home Access: Stairs to enter without rails  Entrance Stairs - Number of Steps: 2-3    Family History:   No family history on file.     Review of Systems:  CONSTITUTIONAL:  negative  EYES:  negative  HEENT:  negative  RESPIRATORY:  positive for  Dyspnea, O2 requirements  CARDIOVASCULAR:  Negative, recent TAVR  GASTROINTESTINAL:  negative for nausea, vomiting, diarrhea and constipation  GENITOURINARY:  negative  SKIN:  negative HEMATOLOGIC/LYMPHATIC:  + swelling BLE , occasional chronically   MUSCULOSKELETAL:  negative for  pain  NEUROLOGICAL:  positive for gait problems  BEHAVIOR/PSYCH:  negative  System review otherwise negative    Physical Exam:  BP (!) 123/56   Pulse 84   Temp 98 °F (36.7 °C) (Oral)   Resp 18   Ht 5' 2\" (1.575 m)   Wt 91 lb 0.8 oz (41.3 kg)   SpO2 94%   BMI 16.65 kg/m²   awake  Orientation:   person, place, time  Mood: within normal limits  Affect: calm  General appearance: Patient is well nourished, well developed, well groomed and in no acute distress, younger than stated age    Memory:   abnormal - took time to remember day,   Attention/Concentration: normal  Language:  normal    Cranial Nerves:  cranial nerves II-XII are grossly intact  ROM:  normal  Motor Exam:  Motor exam is symmetrical 4+ out of 5 all extremities bilaterally  Tone:  normal  Muscle bulk: within normal limits  Sensory:  Sensory intact    Heart: normal rate and regular rhythm, murmur  Lungs: clear to auscultation without wheezes or rales  Abdomen: soft, non-tender and non-distended    Skin: warm and dry, no rash or erythema, bandaid to right neck noted, clean  Peripheral vascular: Pulses: Normal upper and lower extremity pulses; Edema: 1+ BLE      Diagnostics:  Recent Results (from the past 24 hour(s))   CBC    Collection Time: 12/20/20  6:17 PM   Result Value Ref Range    WBC 8.6 4.8 - 10.8 thou/mm3    RBC 3.40 (L) 4.20 - 5.40 mill/mm3    Hemoglobin 10.7 (L) 12.0 - 16.0 gm/dl    Hematocrit 32.8 (L) 37.0 - 47.0 %    MCV 96.5 81.0 - 99.0 fL    MCH 31.5 26.0 - 33.0 pg    MCHC 32.6 32.2 - 35.5 gm/dl    RDW-CV 13.2 11.5 - 14.5 %    RDW-SD 46.3 (H) 35.0 - 45.0 fL    Platelets 086 768 - 556 thou/mm3    MPV 12.4 9.4 - 12.4 fL   Protime-INR    Collection Time: 12/20/20  6:17 PM   Result Value Ref Range    INR 1.13 0.85 - 1.13   APTT    Collection Time: 12/20/20  6:17 PM   Result Value Ref Range    aPTT 28.1 22.0 - 38.0 seconds Basic Metabolic Panel w/ Reflex to MG    Collection Time: 12/20/20  6:17 PM   Result Value Ref Range    Sodium 135 135 - 145 meq/L    Potassium reflex Magnesium 3.4 (L) 3.5 - 5.2 meq/L    Chloride 102 98 - 111 meq/L    CO2 25 23 - 33 meq/L    Glucose 128 (H) 70 - 108 mg/dL    BUN 18 7 - 22 mg/dL    CREATININE 0.8 0.4 - 1.2 mg/dL    Calcium 9.8 8.5 - 10.5 mg/dL   Anion Gap    Collection Time: 12/20/20  6:17 PM   Result Value Ref Range    Anion Gap 8.0 8.0 - 16.0 meq/L   Glomerular Filtration Rate, Estimated    Collection Time: 12/20/20  6:17 PM   Result Value Ref Range    Est, Glom Filt Rate 67 (A) ml/min/1.73m2   Magnesium    Collection Time: 12/20/20  6:17 PM   Result Value Ref Range    Magnesium 1.8 1.6 - 2.4 mg/dL   EKG 12 lead    Collection Time: 12/21/20  6:31 AM   Result Value Ref Range    Ventricular Rate 84 BPM    Atrial Rate 84 BPM    P-R Interval 158 ms    QRS Duration 140 ms    Q-T Interval 374 ms    QTc Calculation (Bazett) 441 ms    P Axis 39 degrees    R Axis -11 degrees    T Axis 110 degrees   CBC    Collection Time: 12/21/20  8:30 AM   Result Value Ref Range    WBC 8.2 4.8 - 10.8 thou/mm3    RBC 3.34 (L) 4.20 - 5.40 mill/mm3    Hemoglobin 10.5 (L) 12.0 - 16.0 gm/dl    Hematocrit 32.0 (L) 37.0 - 47.0 %    MCV 95.8 81.0 - 99.0 fL    MCH 31.4 26.0 - 33.0 pg    MCHC 32.8 32.2 - 35.5 gm/dl    RDW-CV 13.2 11.5 - 14.5 %    RDW-SD 46.2 (H) 35.0 - 45.0 fL    Platelets 454 787 - 737 thou/mm3    MPV 12.3 9.4 - 12.4 fL   Basic Metabolic Panel w/ Reflex to MG    Collection Time: 12/21/20  8:30 AM   Result Value Ref Range    Sodium 139 135 - 145 meq/L    Potassium reflex Magnesium 3.7 3.5 - 5.2 meq/L    Chloride 104 98 - 111 meq/L    CO2 25 23 - 33 meq/L    Glucose 106 70 - 108 mg/dL    BUN 14 7 - 22 mg/dL    CREATININE 0.6 0.4 - 1.2 mg/dL    Calcium 10.1 8.5 - 10.5 mg/dL   Anion Gap    Collection Time: 12/21/20  8:30 AM   Result Value Ref Range    Anion Gap 10.0 8.0 - 16.0 meq/L Glomerular Filtration Rate, Estimated    Collection Time: 12/21/20  8:30 AM   Result Value Ref Range    Est, Glom Filt Rate >90 ml/min/1.73m2       Impression:  · TAVR d/t severe aortic stenosis  · Debility   · Cardiac arrest   · Acute respiratory failure requiring intubation  · 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 It was my pleasure to evaluate Rui Purdy today. Please call with questions.     Brett Dukes, APRN - CNP

## 2020-12-21 NOTE — PROGRESS NOTES
Structural Heart/Cardiology Progress Note    2020 9:17 AM    Procedure:  TAVR        POD #:  2    Subjective: The pt is resting comfortably in bed on NC, alert, and in no acute distress. Hemodynamically stable. NSR heart rate. She states she is sore all over. The pt denies chest pressure, SOB, palpitations, fever, chills, N/V/D. Vital Signs: BP (!) 142/61   Pulse 88   Temp 98.1 °F (36.7 °C) (Oral)   Resp 18   Ht 5' 2\" (1.575 m)   Wt 91 lb 0.8 oz (41.3 kg)   SpO2 94%   BMI 16.65 kg/m²    Temp (24hrs), Av.1 °F (36.7 °C), Min:97.5 °F (36.4 °C), Max:98.8 °F (37.1 °C)      Weight: 91 lb 0.8 oz (41.3 kg)       PULSE OXIMETRY RANGE: SpO2  Av.4 %  Min: 88 %  Max: 96 %  SUPPLEMENTAL O2: O2 Flow Rate (L/min): 1 L/min     Labs:   CBC:     Recent Labs     20  18420  0830   WBC 9.1 8.6 8.2   HGB 10.7* 10.7* 10.5*   HCT 31.4* 32.8* 32.0*   MCV 91.3 96.5 95.8   * 131 140     BMP:  Recent Labs     20  23120  23120  18420  0830   *   < > 141 135 139   K 3.8   < > 3.5 3.4* 3.7   CL 96*   < > 107 102 104   CO2 21*   < > 22* 25 25   PHOS 3.4  --   --   --   --    BUN 11   < > 12 18 14   CREATININE 0.8   < > 0.8 0.8 0.6   MG  --   --   --  1.8  --     < > = values in this interval not displayed.      Last HgA1C:  No results found for: LABA1C  PT/INR:   Recent Labs     20  1320 208 12/20/20  1817   INR 0.99 1.06 1.13     APTT:   Recent Labs     20  1320 20  2318 20   APTT 28.3 26.1 28.1         Intake/Output Summary (Last 24 hours) at 2020 9856  Last data filed at 2020 1935  Gross per 24 hour   Intake 880 ml   Output 225 ml   Net 655 ml       Scheduled Meds:    potassium (CARDIAC) replacement protocol   Other RX Placeholder    potassium replacement protocol   Other RX Placeholder    aspirin  81 mg Oral Daily    clopidogrel  75 mg Oral Daily  metoprolol tartrate  25 mg Oral BID    aspirin  325 mg Oral Once    sodium chloride flush  10 mL Intravenous 2 times per day    [Held by provider] enoxaparin  40 mg Subcutaneous Daily       ROS: All neg unless specifically mentioned in subjective section. Exam:   General Appearance: alert ,conversing, in no acute distress  Cardiovascular: normalrate, regular rhythm, normal S1 and S2, no murmurs, rubs, clicks, or gallops  Pulmonary/Chest: clear to auscultation bilaterally- no wheezes, rales or rhonchi, normal air movement, no respiratory distress  Abdomen:soft, non-tender, non-distended, normal bowel sounds, no bruits,   Extremities: no cyanosis, clubbing or edema. Pulses: Bilateral radial, femoral, DP, and PT pulses intact. No femoral bruits noted. Skin: warm and dry, no rash or erythema  Head: normocephalic and atraumatic  Neck: supple and non-tender without mass, no thyromegaly, no JVD   Musculoskeletal: normal range of motion, no joint swelling, deformity or tenderness. Neurological: alert, oriented, normal speech, no focal findings or movement disorder noted  Groin: Wounds healing appropriately. No signs of infection or hematoma. Assessment:   Patient Active Problem List   Diagnosis    HTN (hypertension)    Hyperlipidemia    Osteoporosis    Allergic rhinitis    IBS (irritable bowel syndrome)    Osteoarthrosis involving multiple sites    Medication monitoring encounter    Postmenopausal bone loss    Anemia    Right hand weakness, atrophy of thumb muscle.     Chest pressure  on exertion    SOB (shortness of breath) on exertion    Chronic congestive heart failure (HCC)    Systolic ejection murmur 5/6 best aortic area    Pansystolic murmur- best tricuspid area 5/6    Severe calcific aortic stenosis    Cardiomyopathy ([address]  S/P CATH - LHC AND RHC- LM-OSTAIL 10% STENOSIS, LAD-PATENT, LCX-P, RCA-P, ; RHC PAP 55/26 MEAN 38 MMHG,PCWP 26 MMHG,- TO BE SCHEDULED FOR TAVR  ASAP WITH DR. CARTAGENA    S/P right and left heart catheterization    Cardiac arrest ([address]    Moderate malnutrition ([address]       S/p TAVR for aortic stenosis    Plan:   1. Continue DAPT  2. Echo this AM  3. Continue to closely monitor left forearm where hussein infiltration is  4. Continue current medical regimen  5. Consult inpatient rehab  6.  Probable d/c tomorrow    The plan of care was discussed in detail with Dr. Janna Devi and Dr. Elie Leonardo

## 2020-12-21 NOTE — FLOWSHEET NOTE
12/21/20 1117   Provider Notification   Reason for Communication New orders   Provider Name Dr. Rhonda Ward   Provider Notification Physician   Method of Communication Secure Message   Response Waiting for response   Notification Time [phone]   Some of her home meds were not continued: Bentyl, multivitamin, zinc, iron and Lovastatin need to be continued if you want them. She is specifically requesting Bentyl Thanks!

## 2020-12-21 NOTE — PROGRESS NOTES
[facility]  SPEECH THERAPY  [facility] CCU-STEPDOWN 3B  Speech  Language  Cognitive Evaluation + Cognitive Therapy    SLP Individual Minutes  Time In: 8282  Time Out: 1500  Minutes: 28  Timed Code Treatment Minutes: 10 Minutes   Speech, language, cognitive evaluation: 18 minutes  Cognitive therapy: 10 minutes       Date: 2020  Patient Name: Reynold Jones      CSN: [id number]   : 1931  (80 y.o.)  Gender: female   Referring Physician:  Comfort Elizabeth PA-C  Diagnosis: Cardiac arrest  Secondary Diagnosis: Cognitive linguistic deficits  Precautions: Fall risk  History of Present Illness/Injury: Pt admitted to [facility] with above med dx; please refer to physician H&P for full details. Per chart review, \"admitted earlier on 2020 as an outpatient under the care of Dr. Arango Members for right and left-sided heart catheterization in preparation for TAVR procedure. 2020 patient had walked to the restroom with complaints of chest pain and dizziness. Per documentation patient became unresponsive in asystole was noted on cardiac monitor. CODE BLUE was called with ROSC obtained after 2 minutes of CPR and 1 round of epi given. Patient was awake answering questions appropriately and following commands. Monitor Sinus Tachycardia with no acute changes noted on EKG. She became hypoxic with SaO2 74% while on 100% NRB Mask. Patient was emergently intubated and transferred to ICU for further management and care. \" Intubation required on 2020, subsequent extubation on 2020. CT head 2020 with, \"no acute intracranial findings\". ST consulted to assess cognitive linguistic domains given variable cognitive performance across alternate therapies and to ascertain need for POC development. Past Medical History:   Diagnosis Date    Hyperlipidemia     Hypertension     Osteoporosis     Pneumonia        Pain: No pain reported. Subjective:  Pt resting in recliner chair upon arrival, alert and pleasant. Positive participation throughout assessment. Post evaluation, pt with high inquiry into necessity of ST services with instruction provided re: rationale to target functional measures given PLOF; improved receptiveness noted. SOCIAL HISTORY:   Living Arrangements: Abbi, independently; x2 step-children in the immediate area to provide supplemental assistance should be required  Work History: Retired  Education Level: Master's degree, special education   Driving Status: Active   Finance Management: Independent; checkbook  Medication Management: Independent; pillbox  ADL's: Independent. Hobbies: Mowing (zero turn ), watching movies  Vision Status: Glasses  Hearing: WFL  Type of Home: House  Home Layout: One level  Home Access: Stairs to enter without rails  Entrance Stairs - Number of Steps: 2-3    ORAL MOTOR:  Facial / Labial WFL    Lingual WFL    Dentition WFL    Velum WFL    Vocal Quality WFL    Sensation WFL    Cough Not Tested      SPEECH / VOICE:  Speech and Voice appear to be grossly intact for basic and complex daily communication    LANGUAGE:  Receptive:  1 Step Commands: 3/3  2 Step Commands: 2/3  Simple Yes/No Questions: 3/3  Complex Yes/No Questions: 3/3    Expressive:  Automatic Speech: WFL numerical counting 1-10  Sentence Completion (open-ended): 3/3  Confrontational Naming: 3/3  Responsive Namin/2  Sentence Formulation: Intact for basic wants/needs  Conversational Speech: Impaired thought organization, complex level; anomia x1 detected  Paraphasias: None evidenced  Repetition: 2/2 sentence level    COGNITION:  Rojas Cognitive Assessment (MOCA) version 7.2 completed. Pt scored 19/30. Normal is greater than or equal to 26/30.   Orientation: 6/6  Immediate Recall: 3/5  Short-Term Recall: 0/5  Divergent Namin items named in 52 second time frame  Problem Solvin/3 Reasonin/2 verbal, 0/2 visuospatial  Sequencin/1  Thought Organization: Impaired, higher level deficits  Insight: Adequate, however, unsure of functional implications  Attention: Impaired sustained attention with concerns for higher level deficits  Math Computation: 1/3 serial subtraction   Executive Functionin/3 clock drawing    SWALLOWING:  Current Diet: Regular with thin liquids. Informal observation with consumption of medications x2 with thin H20 with RN April present. No overt s/s aspiration exhibited with nursing denying concerns r/t swallow function. Will continue to monitor peripherally given recent needs for intubation+extubation, completing further clinical swallow evaluation should it become indicated. RECOMMENDATIONS/ASSESSMENT:  DIAGNOSTIC IMPRESSIONS:  Pt presents with a suspected mild cognitive deficit given impaired immediate/delayed recall, working memory, higher level problem solving + reasoning, math computation, executive functioning, complex attention, and mental flexibility. Further deficits noted during conversational exchanges with reduced cohesion of thought, anomia, and tangential speech tendencies. Mod I receptive language impairment detected given breakdowns for comprehension/understanding of complex information. Skilled ST services are recommended to address the above aforementioned deficits to obtain optimal level of functionality per PLOF and to return to home setting independently.       Rehabilitation Potential: excellent  *dependent upon medical course progression, potential IPR candidate    EDUCATION:  Learner: Patient  Education:  Reviewed results and recommendations of this evaluation, Reviewed ST goals and Plan of Care and Reviewed recommendations for follow-up  Evaluation of Education: Verbalizes understanding, Demonstrates with assistance, Needs further instruction and Family not present    PLAN: Skilled SLP intervention on acute care 3-5 x per week or until goals met and/or pt plateaus in function. Specific interventions for next session may include: cognitive linguistic tasks. PATIENT GOAL:    Did not state. Will further assess during treatment. SHORT TERM GOALS:  Short-term Goals  Timeframe for Short-term Goals: 2 weeks  Goal 1: Pt will complete functional immediate/delayed recall and working memory tasks (with focus on compensatory strategies) with 70% accuracy, mod cues to improve retention of pertinent information. Goal 2: Pt will complete higher level problem solving and verbal/visual reasoning tasks with 80% accuracy, min cues to assist with independence for ADL contribution. INTERVENTIONS: Problem solving:  -Call light: 2/3 justifications for usage independent, 1/3 min cues to enhance awareness  -Home scenarios: 4/5 independent, 1/5 min cues; independent verbalization of emergency phone number of 911  *tangential speech tendencies with re-directions required   *circular reasoning evidenced    Goal 3: Pt will complete moderately complex thought organization and executive functioning tasks (time, money/math/finances, medications, home-related) with 80% accuracy, min cues to maximize IADL completion per PLOF. Goal 4: Pt will complete higher level comprehension (written/auditory) tasks with 80% accuracy, min cues to assist with understanding of multi-faceted information relevant to daily living scenarios. Goal 5: Pt will complete sustained, selective, and divided attention tasks with no more than 3 re-directions/errors until task completion to improve mental focus and ability to return to driving and household obligations. Goal 6: Pt will complete higher level speech naming tasks (divergent, convergent, verbal fluency) and exhibit an understanding for word finding strategies with 90% accuracy, mod cues to reduce presence of anomia for effective communicative exchanges.     LONG TERM GOALS: No LTG established given short ELOS      Vish RAÚL Ramsay., [address] [facility]

## 2020-12-21 NOTE — TELEPHONE ENCOUNTER
Order received from Dr. Renee Hamliton to schedule the patient for her ECHO, CBC, BMP prior to the 30 day post TAVR appointment scheduled on 1/27/2021. Gema, can you have this scheduled and ready for the patient prior to the 7 day post TAVR appointment with Halima Parra on 12/28/2020? Thanks!

## 2020-12-21 NOTE — PROGRESS NOTES
[facility]  INPATIENT PHYSICAL THERAPY  EVALUATION  [facility] CCU-STEPDOWN 3B - 3B-27/027-A    Time In: 6805  Time Out: 1410  Timed Code Treatment Minutes: 23 Minutes  Minutes: 30          Date: 2020  Patient Name: uRi Purdy,  Gender:  female        MRN: [id number]  : 1931  (80 y.o.)      Referring Practitioner: LISA Baeza PA-C  Diagnosis: Cardiac arrest  Additional Pertinent Hx: Per MD note: 80 y.o. female presents without any major cardiac history of 2 weeks of worsening sob, and chest pain, 5/10. Pt came in for scheduled heart cath 2020, s/p heart cath was a code blue. s/p emgerent TAVR 2020     Restrictions/Precautions:  Restrictions/Precautions: Fall Risk  Position Activity Restriction  Other position/activity restrictions: s/p TAVR on 2020    Subjective:  Chart Reviewed: Yes  Patient assessed for rehabilitation services?: Yes  Family / Caregiver Present: No  Subjective: Pt in chair, agreeable to PT eval.    General:  Overall Orientation Status: Within Functional Limits  Follows Commands: Within Functional Limits    Vision: Within Functional Limits    Hearing: Within functional limits         Pain: 5/10: chest discomfort after walking      Social/Functional History:    Lives With: Alone  Type of Home: House  Home Layout: One level  Home Access: Stairs to enter without rails  Entrance Stairs - Number of Steps: 2-3     Bathroom Shower/Tub: Walk-in shower  Bathroom Toilet: Standard       ADL Assistance: Independent  Homemaking Assistance: Independent  Ambulation Assistance: Independent  Transfer Assistance: Independent    Active : Yes     Additional Comments: No AD at home, fully indep PLOF.     OBJECTIVE:  Range of Motion:  Bilateral Lower Extremity: WFL    Strength:  Bilateral Lower Extremity: 4-/5     Balance:  Static Sitting Balance:  Stand By Assistance  Static Standing Balance: Stand By Assistance  Dynamic Standing Balance: Contact Guard Assistance Assessment: Pt has generalized weakness, decreased endurance , decreased functional mobility and balance. Pt needs continued therapy to imporve safety with mobility and return to prior level of function  Prognosis: Good    REQUIRES PT FOLLOW UP: Yes    Discharge Recommendations:  Discharge Recommendations: IP Rehab, Patient would benefit from continued therapy after discharge    Patient Education:  PT Education: Goals, PT Role, General Safety, Gait Training, Plan of Care, Home Exercise Program, Transfer Training, Functional Mobility Training    Equipment Recommendations:  Equipment Needed: No    Plan:  Times per week: 5 X GM  Current Treatment Recommendations: Strengthening, Balance Training, Endurance Training, Functional Mobility Training, Transfer Training, Gait Training, Stair training, Home Exercise Program    Goals:  Patient goals : Go to rehab  Short term goals  Time Frame for Short term goals: by discharge  Short term goal 1: supine to sit and return with S to get in and out of bed  Short term goal 2: sit to stand with S to get on and off various surfaces  Short term goal 3: Pt will ambulate with least restrictive device and  feet to walk safely in the home and community distances  Short term goal 4: ascend/descend 2-3 steps with SBA to enter home  Long term goals  Time Frame for Long term goals : NA due to short ELOS    Following session, patient left in safe position with all fall risk precautions in place.

## 2020-12-21 NOTE — PROGRESS NOTES
PHASE 1 CARDIAC REHABILITATION   CABG, AVR, MVR, TVR, AMI, PCI's  Exercise Physiologists      Treatment Length (l: long, n: normal, s: short): N  Treatment Length Note:   Vitals Stable?: Yes. If No:     Any ECG-Rhythm Issues?: No.  If Yes:   Transfers (bc: Bed to Chair, cb: Chair to Bed): pt up in chair  Strengthening/ROM Treatment Exercises: NA    FITT:     Frequency: 2 x per day   Intensity: <15 RPE, <120bpm   Time: >30-60 seconds longer or >20% increase in distance each walk   Type: Bed/Chair Exercises/stationary marching or ambulation    Aerobic treatment: Pt ambulated from chair to hallway and then from chair to toilet with rolling walker        Gait (i: Independent, s: Steady, u: Unsteady): S  Assists: 1  Patient tolerated treatment (w: well, f: fair, p: poor): W  Goals:    Short term:  Progression on each aerobic treatment. Long term: Independent ambulation and discharge. Christiane Jackson is to follow sternal precautions. Will learn techniques for getting in and out of bed/chairs/car without using the arms. Using stairs will be addressed if applicable. Home activity instructions (Frequency, Intensity, Time, Type), and progression will be addressed prior to discharge (unless Christiane Jackson goes to TCU or an ECF where they will follow PT/OT protocols). Notes: Call light left within reach.     Education given:   Phase II Information (Given upon Discharge):

## 2020-12-21 NOTE — PROGRESS NOTES
[facility]  INPATIENT OCCUPATIONAL THERAPY  STR[facility] CCU-STEPDOWN 3B  EVALUATION    Time:    Time In: 9818  Time Out: 1145  Timed Code Treatment Minutes: 8 Minutes  Minutes: 23          Date: 2020  Patient Name: Zina Crisostomo,   Gender: female      MRN: [id number]  : 1931  (80 y.o.)  Referring Practitioner: LISA Baeza PA-C  Diagnosis: Cardiac Arrest  Additional Pertinent Hx: Per MD note: 80 y.o. female presents without any major cardiac history of 2 weeks of worsening sob, and chest pain, 5/10. Pt came in for scheduled heart cath 2020, s/p heart cath was a code blue. s/p emgerent TAVR 2020    Restrictions/Precautions:  Restrictions/Precautions: Fall Risk  Position Activity Restriction  Other position/activity restrictions: s/p TAVR on 2020    Subjective  Chart Reviewed: Yes, Orders, Progress Notes, History and Physical  Patient assessed for rehabilitation services?: Yes  Family / Caregiver Present: No    Subjective: copperative, asking many questions    Pain:  Pain Assessment  Patient Currently in Pain: Yes(L side/ribs)  Pain Level: 8    Social/Functional History:  Lives With: Alone  Type of Home: House  Home Layout: One level  Home Access: Stairs to enter without rails(2 (through garage))   Bathroom Shower/Tub: Walk-in shower  Bathroom Toilet: Standard(has ETS in another bathroom)       ADL Assistance: Independent  Homemaking Assistance: Independent  Ambulation Assistance: Independent  Transfer Assistance: Independent    Active : Yes     Additional Comments: No AD at home, fully indep PLOF.     Cognition/Orientation:     Overall Cognitive Status: Exceptions(decreased STM, slow processing & problem solving)    ADL's:  Grooming: Stand by assistance(close SBA while standing at sink)  LE Dressing: Stand by assistance(able to cross BLE up to adjust socks)       Functional Mobility:       Functional Mobility  Functional - Mobility Device: No device Activity: To/from bathroom  Assist Level: Contact guard assistance  Functional Mobility Comments: unsteadiness noted, Pt reaching out for objects to stabilize self     Balance:  Balance  Sitting Balance: Stand by assistance  Standing Balance: Stand by assistance(static)    Transfers:  Sit to stand: Contact guard assistance  Stand to sit: Stand by assistance       Upper Extremity Assessment:   LUE AROM : WFL  RUE AROM : WFL    LUE Strength  Gross LUE Strength: WFL  RUE Strength  Gross RUE Strength: WFL    Sensation  Overall Sensation Status: WFL       Activity Tolerance: Patient limited by fatigue, Treatment limited secondary to decreased cognition, Patient limited by pain       Assessment:  Assessment: Pt demo decreased ADL & functional mobility status over PLOF d/t decreased balance, endurance, & cognition. Continued OT recommended to educate Pt on safety & adaptative strategies to increase indep & safety for returning home. Performance deficits / Impairments: Decreased functional mobility , Decreased ADL status, Decreased balance, Decreased safe awareness, Decreased endurance, Decreased cognition  Prognosis: Fair  REQUIRES OT FOLLOW UP: Yes  Decision Making: Medium Complexity  Safety Devices in place: Yes  Type of devices: All fall risk precautions in place, Call light within reach, Gait belt    Treatment Initiated: Treatment and education initiated within context of evaluation. Evaluation time included review of current medical information, gathering information related to past medical, social and functional history, completion of standardized testing, formal and informal observation of tasks, assessment of data and development of plan of care and goals. Treatment time included skilled education and facilitation of tasks to increase safety and independence with ADL's for improved functional independence and quality of life.     Discharge Recommendations:  IP Rehab    Patient Education:

## 2020-12-22 LAB
ANION GAP SERPL CALCULATED.3IONS-SCNC: 10 MEQ/L (ref 8–16)
BUN BLDV-MCNC: 11 MG/DL (ref 7–22)
CALCIUM SERPL-MCNC: 9.7 MG/DL (ref 8.5–10.5)
CHLORIDE BLD-SCNC: 106 MEQ/L (ref 98–111)
CO2: 22 MEQ/L (ref 23–33)
CREAT SERPL-MCNC: 0.6 MG/DL (ref 0.4–1.2)
EKG ATRIAL RATE: 79 BPM
EKG P AXIS: 40 DEGREES
EKG P-R INTERVAL: 168 MS
EKG Q-T INTERVAL: 386 MS
EKG QRS DURATION: 138 MS
EKG QTC CALCULATION (BAZETT): 442 MS
EKG R AXIS: -16 DEGREES
EKG T AXIS: 101 DEGREES
EKG VENTRICULAR RATE: 79 BPM
GFR SERPL CREATININE-BSD FRML MDRD: > 90 ML/MIN/1.73M2
GLUCOSE BLD-MCNC: 144 MG/DL (ref 70–108)
POTASSIUM SERPL-SCNC: 4.3 MEQ/L (ref 3.5–5.2)
SODIUM BLD-SCNC: 138 MEQ/L (ref 135–145)

## 2020-12-22 PROCEDURE — 97116 GAIT TRAINING THERAPY: CPT

## 2020-12-22 PROCEDURE — APPSS60 APP SPLIT SHARED TIME 46-60 MINUTES: Performed by: PHYSICIAN ASSISTANT

## 2020-12-22 PROCEDURE — 94760 N-INVAS EAR/PLS OXIMETRY 1: CPT

## 2020-12-22 PROCEDURE — 36415 COLL VENOUS BLD VENIPUNCTURE: CPT

## 2020-12-22 PROCEDURE — 2580000003 HC RX 258: Performed by: NURSE PRACTITIONER

## 2020-12-22 PROCEDURE — 93005 ELECTROCARDIOGRAM TRACING: CPT | Performed by: INTERNAL MEDICINE

## 2020-12-22 PROCEDURE — 80048 BASIC METABOLIC PNL TOTAL CA: CPT

## 2020-12-22 PROCEDURE — 97110 THERAPEUTIC EXERCISES: CPT

## 2020-12-22 PROCEDURE — 6370000000 HC RX 637 (ALT 250 FOR IP): Performed by: PHYSICIAN ASSISTANT

## 2020-12-22 PROCEDURE — 93010 ELECTROCARDIOGRAM REPORT: CPT | Performed by: NUCLEAR MEDICINE

## 2020-12-22 PROCEDURE — 97130 THER IVNTJ EA ADDL 15 MIN: CPT

## 2020-12-22 PROCEDURE — 97535 SELF CARE MNGMENT TRAINING: CPT

## 2020-12-22 PROCEDURE — 97129 THER IVNTJ 1ST 15 MIN: CPT

## 2020-12-22 PROCEDURE — 6370000000 HC RX 637 (ALT 250 FOR IP): Performed by: INTERNAL MEDICINE

## 2020-12-22 PROCEDURE — 2140000000 HC CCU INTERMEDIATE R&B

## 2020-12-22 RX ORDER — SODIUM CHLORIDE 0.9 % (FLUSH) 0.9 %
10 SYRINGE (ML) INJECTION 2 TIMES DAILY
Status: CANCELLED | OUTPATIENT
Start: 2020-12-22

## 2020-12-22 RX ORDER — POLYETHYLENE GLYCOL 3350 17 G/17G
17 POWDER, FOR SOLUTION ORAL DAILY PRN
Status: CANCELLED | OUTPATIENT
Start: 2020-12-22

## 2020-12-22 RX ORDER — ASPIRIN 81 MG/1
81 TABLET ORAL DAILY
Status: CANCELLED | OUTPATIENT
Start: 2020-12-23

## 2020-12-22 RX ORDER — M-VIT,TX,IRON,MINS/CALC/FOLIC 27MG-0.4MG
1 TABLET ORAL DAILY
Status: CANCELLED | OUTPATIENT
Start: 2020-12-23

## 2020-12-22 RX ORDER — FUROSEMIDE 40 MG/1
40 TABLET ORAL DAILY
Status: DISCONTINUED | OUTPATIENT
Start: 2020-12-22 | End: 2020-12-24 | Stop reason: HOSPADM

## 2020-12-22 RX ORDER — DICYCLOMINE HYDROCHLORIDE 10 MG/1
10 CAPSULE ORAL 2 TIMES DAILY
Status: CANCELLED | OUTPATIENT
Start: 2020-12-22

## 2020-12-22 RX ORDER — SODIUM CHLORIDE 0.9 % (FLUSH) 0.9 %
10 SYRINGE (ML) INJECTION PRN
Status: CANCELLED | OUTPATIENT
Start: 2020-12-22

## 2020-12-22 RX ORDER — ATORVASTATIN CALCIUM 10 MG/1
10 TABLET, FILM COATED ORAL NIGHTLY
Status: CANCELLED | OUTPATIENT
Start: 2020-12-22

## 2020-12-22 RX ORDER — POTASSIUM CHLORIDE 20 MEQ/1
40 TABLET, EXTENDED RELEASE ORAL PRN
Status: CANCELLED | OUTPATIENT
Start: 2020-12-22

## 2020-12-22 RX ORDER — POTASSIUM CHLORIDE 7.45 MG/ML
10 INJECTION INTRAVENOUS PRN
Status: CANCELLED | OUTPATIENT
Start: 2020-12-22

## 2020-12-22 RX ORDER — POTASSIUM CHLORIDE 20 MEQ/1
20 TABLET, EXTENDED RELEASE ORAL DAILY
Status: CANCELLED | OUTPATIENT
Start: 2020-12-22

## 2020-12-22 RX ORDER — ACETAMINOPHEN 325 MG/1
650 TABLET ORAL EVERY 4 HOURS PRN
Status: CANCELLED | OUTPATIENT
Start: 2020-12-22

## 2020-12-22 RX ORDER — FUROSEMIDE 40 MG/1
40 TABLET ORAL DAILY
Status: CANCELLED | OUTPATIENT
Start: 2020-12-22

## 2020-12-22 RX ORDER — ONDANSETRON 2 MG/ML
4 INJECTION INTRAMUSCULAR; INTRAVENOUS EVERY 6 HOURS PRN
Status: CANCELLED | OUTPATIENT
Start: 2020-12-22

## 2020-12-22 RX ORDER — PROMETHAZINE HYDROCHLORIDE 25 MG/1
12.5 TABLET ORAL EVERY 6 HOURS PRN
Status: CANCELLED | OUTPATIENT
Start: 2020-12-22

## 2020-12-22 RX ORDER — SODIUM CHLORIDE 0.9 % (FLUSH) 0.9 %
10 SYRINGE (ML) INJECTION EVERY 12 HOURS SCHEDULED
Status: CANCELLED | OUTPATIENT
Start: 2020-12-22

## 2020-12-22 RX ORDER — POTASSIUM CHLORIDE 20 MEQ/1
20 TABLET, EXTENDED RELEASE ORAL DAILY
Status: DISCONTINUED | OUTPATIENT
Start: 2020-12-22 | End: 2020-12-24 | Stop reason: HOSPADM

## 2020-12-22 RX ORDER — CLOPIDOGREL BISULFATE 75 MG/1
75 TABLET ORAL DAILY
Status: CANCELLED | OUTPATIENT
Start: 2020-12-23

## 2020-12-22 RX ADMIN — FUROSEMIDE 40 MG: 40 TABLET ORAL at 11:07

## 2020-12-22 RX ADMIN — ACETAMINOPHEN 500 MG: 500 TABLET ORAL at 07:55

## 2020-12-22 RX ADMIN — Medication 10 ML: at 20:42

## 2020-12-22 RX ADMIN — DICYCLOMINE HYDROCHLORIDE 10 MG: 10 CAPSULE ORAL at 20:42

## 2020-12-22 RX ADMIN — MULTIPLE VITAMINS W/ MINERALS TAB 1 TABLET: TAB at 07:55

## 2020-12-22 RX ADMIN — ACETAMINOPHEN 500 MG: 500 TABLET ORAL at 20:48

## 2020-12-22 RX ADMIN — ATORVASTATIN CALCIUM 10 MG: 10 TABLET, FILM COATED ORAL at 20:42

## 2020-12-22 RX ADMIN — POTASSIUM CHLORIDE 20 MEQ: 1500 TABLET, EXTENDED RELEASE ORAL at 11:07

## 2020-12-22 RX ADMIN — ASPIRIN 81 MG: 81 TABLET, COATED ORAL at 07:55

## 2020-12-22 RX ADMIN — DICYCLOMINE HYDROCHLORIDE 10 MG: 10 CAPSULE ORAL at 07:55

## 2020-12-22 RX ADMIN — METOPROLOL TARTRATE 25 MG: 25 TABLET ORAL at 07:55

## 2020-12-22 RX ADMIN — METOPROLOL TARTRATE 25 MG: 25 TABLET ORAL at 20:42

## 2020-12-22 RX ADMIN — Medication 10 ML: at 07:55

## 2020-12-22 RX ADMIN — CLOPIDOGREL BISULFATE 75 MG: 75 TABLET ORAL at 07:55

## 2020-12-22 ASSESSMENT — PAIN DESCRIPTION - PAIN TYPE
TYPE: ACUTE PAIN
TYPE: ACUTE PAIN

## 2020-12-22 ASSESSMENT — PAIN DESCRIPTION - ORIENTATION: ORIENTATION: LEFT

## 2020-12-22 ASSESSMENT — PAIN DESCRIPTION - LOCATION
LOCATION: GENERALIZED
LOCATION: RIB CAGE

## 2020-12-22 ASSESSMENT — PAIN SCALES - GENERAL
PAINLEVEL_OUTOF10: 3
PAINLEVEL_OUTOF10: 7
PAINLEVEL_OUTOF10: 0
PAINLEVEL_OUTOF10: 0

## 2020-12-22 NOTE — PROGRESS NOTES
[address] [address]  INPATIENT PHYSICAL THERAPY  DAILY NOTE  [facility] CCU-STEPDOWN 3B - 3B-27/027-A    Time In: 0935  Time Out: 1000  Timed Code Treatment Minutes: 25 Minutes  Minutes: 25          Date: 2020  Patient Name: Venessa Walton,  Gender:  female        MRN: [id number]  : 1931  (80 y.o.)     Referring Practitioner: LISA Baeza PA-C  Diagnosis: Cardiac arrest  Additional Pertinent Hx: Per MD note: 80 y.o. female presents without any major cardiac history of 2 weeks of worsening sob, and chest pain, 5/10. Pt came in for scheduled heart cath 2020, s/p heart cath was a code blue. s/p emgerent TAVR 2020     Prior Level of Function:  Lives With: Alone  Type of Home: House  Home Layout: One level  Home Access: Stairs to enter without rails  Entrance Stairs - Number of Steps: 2-3   Bathroom Shower/Tub: Walk-in shower  Bathroom Toilet: Standard    ADL Assistance: Independent  Homemaking Assistance: Independent  Ambulation Assistance: Independent  Transfer Assistance: Independent  Active : Yes  Additional Comments: No AD at home, fully indep PLOF. Restrictions/Precautions:  Restrictions/Precautions: Fall Risk  Position Activity Restriction  Other position/activity restrictions: s/p TAVR on 2020     SUBJECTIVE: Pt. Seated in BS chair upon arrival and pleasantly agrees to therapy session. Vitals: WNLs      PAIN: Not rated: Ribs    OBJECTIVE:  Bed Mobility:  Not Tested    Transfers:  Sit to Stand: Supervision  Stand to Sit:Supervision    Ambulation:  Supervision  Distance: 150' x 1  Surface: Level Tile  Device:Rolling Walker  Gait Deviations:  Slow Oly, Decreased Gait Speed and Decreased Heel Strike Bilaterally      Exercise:  Patient was guided in 1 set(s) 10 reps of exercise to both lower extremities. Glut sets, Hip abduction/adduction, Upper trunk rotations, Shoulder horizontal abduction/adduction, Seated marches, Seated heel/toe raises and Long arc quads. Exercises were completed for increased independence with functional mobility. Functional Outcome Measures: Not completed       ASSESSMENT:  Assessment: Patient progressing toward established goals. Activity Tolerance:  Patient tolerance of  treatment: good. Equipment Recommendations:Equipment Needed: No  Discharge Recommendations:  IP Rehab, Patient would benefit from continued therapy after discharge    Plan: Times per week: 5 X GM  Current Treatment Recommendations: Strengthening, Balance Training, Endurance Training, Functional Mobility Training, Transfer Training, Gait Training, Stair training, Home Exercise Program    Patient Education  Patient Education: Plan of Care, Transfers, Reviewed Prior Education, Gait, Verbal Exercise Instruction    Goals:  Patient goals : Go to rehab  Short term goals  Time Frame for Short term goals: by discharge  Short term goal 1: supine to sit and return with S to get in and out of bed  Short term goal 2: sit to stand with S to get on and off various surfaces  Short term goal 3: Pt will ambulate with least restrictive device and  feet to walk safely in the home and community distances  Short term goal 4: ascend/descend 2-3 steps with SBA to enter home  Long term goals  Time Frame for Long term goals : NA due to short ELOS    Following session, patient left in safe position with all fall risk precautions in place.

## 2020-12-22 NOTE — CARE COORDINATION
12/22/20, 12:18 PM EST    DISCHARGE PLANNING EVALUATION  Called patients daughter Manuelito Jimenes to make her aware patient has been accepted to Inpatient Rehab. She is aware that an insurance precert has been started and Sindy America will not go to Inpatient Rehab until the precert is approved.

## 2020-12-22 NOTE — PROGRESS NOTES
Structural Heart/Cardiology Progress Note    2020 8:57 AM    Procedure:  TAVR        POD #:  2    Subjective: The pt is resting comfortably in bed on RA, alert, and in no acute distress. Hemodynamically stable. NSR. She states she feels much improved today. She if off NC. The pt denies chest pressure, SOB, palpitations, fever, chills, N/V/D.      Vital Signs: /65   Pulse 92   Temp 98 °F (36.7 °C) (Oral)   Resp 17   Ht 5' 2\" (1.575 m)   Wt 91 lb 0.8 oz (41.3 kg)   SpO2 94%   BMI 16.65 kg/m²    Temp (24hrs), Av.1 °F (36.7 °C), Min:97.9 °F (36.6 °C), Max:98.5 °F (36.9 °C)      Weight: 91 lb 0.8 oz (41.3 kg)       PULSE OXIMETRY RANGE: SpO2  Av.4 %  Min: 93 %  Max: 96 %  SUPPLEMENTAL O2: O2 Flow Rate (L/min): 1 L/min     Labs:   CBC:     Recent Labs     20  0830   WBC 9.1 8.6 8.2   HGB 10.7* 10.7* 10.5*   HCT 31.4* 32.8* 32.0*   MCV 91.3 96.5 95.8   * 131 140     BMP:  Recent Labs     20  18420  0830    135 139   K 3.5 3.4* 3.7    102 104   CO2 22* 25 25   BUN 12 18 14   CREATININE 0.8 0.8 0.6   MG  --  1.8  --      Last HgA1C:  No results found for: LABA1C  PT/INR:   Recent Labs     20   INR 1.13     APTT:   Recent Labs     20   APTT 28.1         Intake/Output Summary (Last 24 hours) at 2020 0857  Last data filed at 2020  Gross per 24 hour   Intake 1195 ml   Output 300 ml   Net 895 ml       Scheduled Meds:    furosemide  40 mg Oral Daily    potassium chloride  20 mEq Oral Daily    potassium (CARDIAC) replacement protocol   Other RX Placeholder    acetaminophen  500 mg Oral Q6H    sodium chloride flush  10 mL Intravenous BID    dicyclomine  10 mg Oral BID    atorvastatin  10 mg Oral Nightly    therapeutic multivitamin-minerals  1 tablet Oral Daily    aspirin  81 mg Oral Daily    clopidogrel  75 mg Oral Daily    metoprolol tartrate  25 mg Oral BID  aspirin  325 mg Oral Once    sodium chloride flush  10 mL Intravenous 2 times per day    [Held by provider] enoxaparin  40 mg Subcutaneous Daily     IMAGING:     Narrative   PROCEDURE: XR CHEST PORTABLE       CLINICAL INFORMATION: post Tavr.       COMPARISON: Chest x-ray dated 19 December 2020.       TECHNIQUE: AP upright view of the chest.       FINDINGS:   There has been interval removal of the endotracheal and nasogastric tubes       There is mild cardiomegaly in this patient status post TAVR surgery. The mediastinum is not widened.  There are moderate bilateral pleural effusions. The right pleural effusion appears worse than on previous study dated 19 December 2020.  The appearance    of the lungs is unchanged. The pulmonary vascularity is normal.   There are degenerative changes in the thoracic spine.            Impression   1. Interval removal of endotracheal and nasogastric tubes. 2. Cardiomegaly status post TAVR surgery. 3. Bilateral pleural effusions. Right pleural effusion appears worse than on previous study dated 19 December 2020.               **This report has been created using voice recognition software. It may contain minor errors which are inherent in voice recognition technology. **       Final report electronically signed by DR Tadeo Mi on 12/21/2020 10:45 AM       ROS: All neg unless specifically mentioned in subjective section. Exam:   General Appearance: alert ,conversing, in no acute distress  Cardiovascular: normalrate, regular rhythm, normal S1 and S2, no murmurs, rubs, clicks, or gallops  Pulmonary/Chest: clear to auscultation bilaterally- no wheezes, rales or rhonchi, normal air movement, no respiratory distress  Abdomen:soft, non-tender, non-distended, normal bowel sounds, no bruits,   Extremities: no cyanosis, clubbing or edema. Pulses: Bilateral radial, femoral, DP, and PT pulses intact. No femoral bruits noted.   Skin: warm and dry, no rash or erythema Head: normocephalic and atraumatic  Neck: supple and non-tender without mass, no thyromegaly, no JVD   Musculoskeletal: normal range of motion, no joint swelling, deformity or tenderness. Neurological: alert, oriented, normal speech, no focal findings or movement disorder noted  Groin: Wounds healing appropriately. No signs of infection or hematoma. Assessment:   Patient Active Problem List   Diagnosis    HTN (hypertension)    Hyperlipidemia    Osteoporosis    Allergic rhinitis    IBS (irritable bowel syndrome)    Osteoarthrosis involving multiple sites    Medication monitoring encounter    Postmenopausal bone loss    Anemia    Right hand weakness, atrophy of thumb muscle.  Chest pressure  on exertion    SOB (shortness of breath) on exertion    Chronic congestive heart failure (HCC)    Systolic ejection murmur 5/6 best aortic area    Pansystolic murmur- best tricuspid area 5/6    Severe aortic stenosis    Cardiomyopathy ([address]    S/P CATH - LHC AND RHC- LM-OSTAIL 10% STENOSIS, LAD-PATENT, LCX-P, RCA-P, ; RHC PAP 55/26 MEAN 38 MMHG,PCWP 26 MMHG,- TO BE SCHEDULED FOR TAVR  ASAP WITH DR. CARTAGENA    S/P right and left heart catheterization    Cardiac arrest ([facility]    Moderate malnutrition ([address]       S/p TAVR for aortic stenosis    Plan:   1. Continue DAPT  2. Continue metoprolol  3. Continue to closely monitor left forearm where hussein infiltration is  4. Start lasix and potassium   5. Check BMP  6. Repeat CXR tomorrow AM to monitor right pleural effusion   7.  She is stable for d/c we are just awaiting precert    The plan of care was discussed in detail with Dr. Renee Hamilton and Dr. Camille Smith

## 2020-12-22 NOTE — CARE COORDINATION
DISASTER CHARTING    12/22/20, 9:16 AM EST    DISCHARGE ONGOING EVALUATION:     [facility] day: 4  Location: [facility]27/027-A Reason for admit: Cardiac arrest [facility]) [I46.9]   Barriers to Discharge: Planning IP Rehab, precert started. PCP: Marzena Freeman MD  Patient Goals/Plan/Treatment Preferences: Planning IP Rehab. Precert started this am per [employer].

## 2020-12-22 NOTE — PROGRESS NOTES
Discharge Recommendations: IP Rehab   Equipment Recommendations: Other: Monitor pending progress  Plan: Times per week: 5x  Current Treatment Recommendations: Functional Mobility Training, Endurance Training, Safety Education & Training, Self-Care / ADL, Balance Training, Cognitive Reorientation    Patient Education  Patient Education: Plan of Care, ADL's, IADL's, Home Safety and Importance of Increasing Activity and safety with functional mobility/transfers. Goals  Short term goals  Time Frame for Short term goals: until discharge  Short term goal 1: Complete various t/fs including toilet with S & 0-2 vcs for safety  Short term goal 2: Complete mobility to/from bathroom + into hallways with S, RW, & 0-2 vcs for safety  Short term goal 3: Complete BADL routine with S & 0-2 vcs for safety  Short term goal 4: Complete 4-5 min dynamic standing task with S for increased ease of returning to meal prep tasks at home  Long term goals  Time Frame for Long term goals : No LTG set d/t short ELOS    Following session, patient left in safe position with all fall risk precautions in place.

## 2020-12-22 NOTE — PROGRESS NOTES
SHORT TERM GOAL #6:  Goal 6: Pt will complete higher level speech naming tasks (divergent, convergent, verbal fluency) and exhibit an understanding for word finding strategies with 90% accuracy, mod cues to reduce presence of anomia for effective communicative exchanges. INTERVENTIONS: Anomia x4 detected throughout conversational speech with pt reports recent development of \"word finding issues\". Education provided to pt re: strategies for circumlocution and substitution to assist with lexical retrieval to convey desired message. Independent employment of circumlocution x3 with successful recall of desired word x2. Will continue to address. Long-Term Goals:  No LTG established given short ELOS      EDUCATION:  Learner: Patient  Education:  Reviewed ST goals and Plan of Care and Reviewed recommendations for follow-up  Evaluation of Education: Adri Soni understanding, Demonstrates with assistance, Needs further instruction and Family not present    ASSESSMENT/PLAN:  Activity Tolerance:  Patient tolerance of  treatment: good. Cooperative with ST and POC. Assessment/Plan: Patient progressing toward established goals. Continues to require skilled care of licensed speech pathologist to progress toward achievement of established goals and plan of care. .     Plan for Next Session: delayed recall, reasoning, higher level comprehension        Mook Licea M.A., [address] [place] [address]

## 2020-12-22 NOTE — PROGRESS NOTES
PHASE 1 CARDIAC REHABILITATION   CABG, AVR, MVR, TVR, AMI, PCI's  Exercise Physiologists        Vitals Stable?: Yes. Any ECG-Rhythm Issues?: No.   Transfers (bc: Bed to Chair, cb: Chair to Bed): bb  Strengthening/ROM Treatment Exercises: Step na    FITT:     Frequency: 2 x per day   Intensity: <15 RPE, <120bpm   Time: >30-60 seconds longer or >20% increase in distance each walk   Type: Bed/Chair Exercises/stationary marching or ambulation    Aerobic treatment: Pt ambulated 180 ft        Gait (i: Independent, s: Steady, u: Unsteady): s with walker  Assists: 1  Patient tolerated treatment (w: well, f: fair, p: poor): w  Goals:    Short term:  Progression on each aerobic treatment. Long term: Independent ambulation and discharge. Helga Alarcon is to follow sternal precautions. Will learn techniques for getting in and out of bed/chairs/car without using the arms. Using stairs will be addressed if applicable. Home activity instructions (Frequency, Intensity, Time, Type), and progression will be addressed prior to discharge (unless Helga Alarcon goes to TCU or an ECF where they will follow PT/OT protocols). Notes: Call light left within reach.     Education given:   Phase II Information (Given upon Discharge):

## 2020-12-23 ENCOUNTER — APPOINTMENT (OUTPATIENT)
Dept: GENERAL RADIOLOGY | Age: 85
DRG: 266 | End: 2020-12-23
Attending: INTERNAL MEDICINE
Payer: MEDICARE

## 2020-12-23 LAB
EKG ATRIAL RATE: 77 BPM
EKG P AXIS: 48 DEGREES
EKG P-R INTERVAL: 180 MS
EKG Q-T INTERVAL: 392 MS
EKG QRS DURATION: 140 MS
EKG QTC CALCULATION (BAZETT): 443 MS
EKG R AXIS: -17 DEGREES
EKG T AXIS: 107 DEGREES
EKG VENTRICULAR RATE: 77 BPM

## 2020-12-23 PROCEDURE — 97130 THER IVNTJ EA ADDL 15 MIN: CPT

## 2020-12-23 PROCEDURE — 97110 THERAPEUTIC EXERCISES: CPT

## 2020-12-23 PROCEDURE — 97535 SELF CARE MNGMENT TRAINING: CPT

## 2020-12-23 PROCEDURE — 6370000000 HC RX 637 (ALT 250 FOR IP): Performed by: PHYSICIAN ASSISTANT

## 2020-12-23 PROCEDURE — 97129 THER IVNTJ 1ST 15 MIN: CPT

## 2020-12-23 PROCEDURE — 93005 ELECTROCARDIOGRAM TRACING: CPT | Performed by: INTERNAL MEDICINE

## 2020-12-23 PROCEDURE — APPSS60 APP SPLIT SHARED TIME 46-60 MINUTES: Performed by: PHYSICIAN ASSISTANT

## 2020-12-23 PROCEDURE — 2580000003 HC RX 258: Performed by: NURSE PRACTITIONER

## 2020-12-23 PROCEDURE — 97116 GAIT TRAINING THERAPY: CPT

## 2020-12-23 PROCEDURE — 6370000000 HC RX 637 (ALT 250 FOR IP): Performed by: INTERNAL MEDICINE

## 2020-12-23 PROCEDURE — 2140000000 HC CCU INTERMEDIATE R&B

## 2020-12-23 PROCEDURE — 71045 X-RAY EXAM CHEST 1 VIEW: CPT

## 2020-12-23 PROCEDURE — 93010 ELECTROCARDIOGRAM REPORT: CPT | Performed by: NUCLEAR MEDICINE

## 2020-12-23 RX ORDER — POTASSIUM CHLORIDE 20 MEQ/1
10 TABLET, EXTENDED RELEASE ORAL ONCE
Status: DISCONTINUED | OUTPATIENT
Start: 2020-12-23 | End: 2020-12-24 | Stop reason: HOSPADM

## 2020-12-23 RX ORDER — FUROSEMIDE 10 MG/ML
20 INJECTION INTRAMUSCULAR; INTRAVENOUS ONCE
Status: DISCONTINUED | OUTPATIENT
Start: 2020-12-23 | End: 2020-12-24 | Stop reason: HOSPADM

## 2020-12-23 RX ADMIN — ASPIRIN 81 MG: 81 TABLET, COATED ORAL at 10:29

## 2020-12-23 RX ADMIN — Medication 10 ML: at 10:30

## 2020-12-23 RX ADMIN — ATORVASTATIN CALCIUM 10 MG: 10 TABLET, FILM COATED ORAL at 20:45

## 2020-12-23 RX ADMIN — METOPROLOL TARTRATE 25 MG: 25 TABLET ORAL at 10:29

## 2020-12-23 RX ADMIN — POTASSIUM CHLORIDE 20 MEQ: 1500 TABLET, EXTENDED RELEASE ORAL at 10:31

## 2020-12-23 RX ADMIN — ACETAMINOPHEN 500 MG: 500 TABLET ORAL at 10:29

## 2020-12-23 RX ADMIN — ACETAMINOPHEN 500 MG: 500 TABLET ORAL at 16:05

## 2020-12-23 RX ADMIN — MULTIPLE VITAMINS W/ MINERALS TAB 1 TABLET: TAB at 10:29

## 2020-12-23 RX ADMIN — DICYCLOMINE HYDROCHLORIDE 10 MG: 10 CAPSULE ORAL at 10:29

## 2020-12-23 RX ADMIN — DICYCLOMINE HYDROCHLORIDE 10 MG: 10 CAPSULE ORAL at 20:45

## 2020-12-23 RX ADMIN — Medication 10 ML: at 20:45

## 2020-12-23 RX ADMIN — CLOPIDOGREL BISULFATE 75 MG: 75 TABLET ORAL at 10:29

## 2020-12-23 RX ADMIN — ACETAMINOPHEN 500 MG: 500 TABLET ORAL at 20:45

## 2020-12-23 RX ADMIN — METOPROLOL TARTRATE 25 MG: 25 TABLET ORAL at 20:45

## 2020-12-23 RX ADMIN — FUROSEMIDE 40 MG: 40 TABLET ORAL at 10:31

## 2020-12-23 RX ADMIN — ACETAMINOPHEN 500 MG: 500 TABLET ORAL at 01:56

## 2020-12-23 ASSESSMENT — PAIN SCALES - GENERAL
PAINLEVEL_OUTOF10: 6
PAINLEVEL_OUTOF10: 2
PAINLEVEL_OUTOF10: 1
PAINLEVEL_OUTOF10: 0

## 2020-12-23 ASSESSMENT — PAIN DESCRIPTION - ORIENTATION: ORIENTATION: LEFT

## 2020-12-23 ASSESSMENT — PAIN DESCRIPTION - PAIN TYPE: TYPE: ACUTE PAIN

## 2020-12-23 ASSESSMENT — PAIN DESCRIPTION - LOCATION: LOCATION: RIB CAGE

## 2020-12-23 NOTE — PROGRESS NOTES
[facility]  INPATIENT PHYSICAL THERAPY  DAILY NOTE  [facility]Z CCU-STEPDOWN 3B - 3B-27/027-A    Time In: 5867  Time Out: 1409  Timed Code Treatment Minutes: 35 Minutes  Minutes: 35          Date: 2020  Patient Name: Tosha Robertson,  Gender:  female        MRN: [id number]  : 1931  (80 y.o.)     Referring Practitioner: LISA Baeza PA-C  Diagnosis: Cardiac arrest  Additional Pertinent Hx: Per MD note: 80 y.o. female presents without any major cardiac history of 2 weeks of worsening sob, and chest pain, 5/10. Pt came in for scheduled heart cath 2020, s/p heart cath was a code blue. s/p emgerent TAVR 2020     Prior Level of Function:  Lives With: Alone  Type of Home: House  Home Layout: One level  Home Access: Stairs to enter without rails  Entrance Stairs - Number of Steps: 2-3   Bathroom Shower/Tub: Walk-in shower  Bathroom Toilet: Standard    ADL Assistance: Independent  Homemaking Assistance: Independent  Ambulation Assistance: Independent  Transfer Assistance: Independent  Active : Yes  Additional Comments: No AD at home, fully indep PLOF. Restrictions/Precautions:  Restrictions/Precautions: Fall Risk  Position Activity Restriction  Other position/activity restrictions: s/p TAVR on 2020     SUBJECTIVE: RN approved session. Pt in recliner upon arrival and agrees to therapy. Pt pleasant and talkative throughout    PAIN: chest and L sided chest wall. Did nto quantify    OBJECTIVE:  Bed Mobility:  Not Tested    Transfers:  Sit to Stand: Stand By Assistance  Stand to Sit:Stand By Assistance    Ambulation:  Stand By Assistance  Distance: 150ft  Surface: Level Tile  Device:Rolling Walker  Gait Deviations:  Slow Oly, Decreased Step Length Bilaterally, Decreased Gait Speed, Decreased Heel Strike Bilaterally and pt demos fatigue and SOB with this distance. HR 97 after gait. Exercise:  Patient was guided in 1 set(s) 10 reps of exercise to both lower extremities. Ankle pumps, Glut sets, Quad sets, Heelslides, Hip abduction/adduction and Straight leg raises. Exercises were completed for increased independence with functional mobility. Extra time to complete with rest breaks prn due to fatigue/SOB    Functional Outcome Measures: Completed  AM-PAC Inpatient Mobility Raw Score : 18  AM-PAC Inpatient T-Scale Score : 43.63    ASSESSMENT:  Assessment: Patient progressing toward established goals. Activity Tolerance:  Patient tolerance of  treatment: good. Pt demos decreased endurance and tolerance to mobility. Pt will benefit from cont PT at this time in the inpatient setting as pt lives alone. Equipment Recommendations:Equipment Needed: No  Discharge Recommendations:  IP Rehab, Patient would benefit from continued therapy after discharge    Plan: Times per week: 5 X GM  Current Treatment Recommendations: Strengthening, Balance Training, Endurance Training, Functional Mobility Training, Transfer Training, Gait Training, Stair training, Home Exercise Program    Patient Education  Patient Education: Plan of Care, Transfers, Gait, Verbal Exercise Instruction    Goals:  Patient goals : Go to rehab  Short term goals  Time Frame for Short term goals: by discharge  Short term goal 1: supine to sit and return with S to get in and out of bed  Short term goal 2: sit to stand with S to get on and off various surfaces  Short term goal 3: Pt will ambulate with least restrictive device and  feet to walk safely in the home and community distances  Short term goal 4: ascend/descend 2-3 steps with SBA to enter home  Long term goals  Time Frame for Long term goals : NA due to short ELOS    Following session, patient left in safe position with all fall risk precautions in place.

## 2020-12-23 NOTE — PROGRESS NOTES
Comprehensive Nutrition Assessment    Type and Reason for Visit:  Reassess(PO Monitor)    Nutrition Recommendations/Plan:   *Started Ensure Enlive daily. *Continue current diet and Multivitamin w/minerals daily. Nutrition Assessment: Pt improving from a nutritional standpoint AEB pt report of consuming 75% of most meals over the past 5 days with no weight loss over the past year. Remains at risk for further nutritional compromise r/t admit with respiratory failure and cardiac arrest and underlying medical conditions (hx hx CAD, Anemia, Impaired glucose tolerance). Nutrition recommendations/interventions as per above. Malnutrition Assessment:  Malnutrition Status: At risk for malnutrition (Comment)    Context:  Acute Illness     Findings of the 6 clinical characteristics of malnutrition:  Energy Intake:  Mild decrease in energy intake (Comment)(pt consuming 51-75% of some meals over the past few days)  Weight Loss:  No significant weight loss     Body Fat Loss:  No significant body fat loss     Muscle Mass Loss:  7 - Moderate muscle mass loss Temples (temporalis), Clavicles (pectoralis & deltoids)  Fluid Accumulation:  1 - Mild Extremities   Strength:  Not Performed    Estimated Daily Nutrient Needs:  Energy (kcal):  1665-0950 kcal/day (25-30 kcal/kg); Weight Used for Energy Requirements:  (41.3 kg on 12/19)     Protein (g):  70+ g/day (1.7+ g/kg); Weight Used for Protein Requirements:  (41.3 kg on 12/19)        Fluid (ml/day):  per MD    Nutrition Related Findings:  admit d/t heart cath then got dizzy and coded; pt intubated on 12/18; s/p TAVR on 12/19; pt extubated on 12/19. Pt seen; she reports she never gets hungry or has an appetite; pt reports consuming 75% of most meals over the past few days. Pt denies N/V or chewing/swallowing difficulties with food. Last BM x2 on 12/22.  Rx includes: Lasix, Multivitamin    Wounds:  None       Current Nutrition Therapies:    DIET GENERAL; Anthropometric Measures:  · Height: 5' 2\" (157.5 cm)  · Current Body Weight: 123 lb 11.2 oz (56.1 kg)(12/23; +trace edema noted)   · Admission Body Weight: 91 lb 0.8 oz (41.3 kg)    · Usual Body Weight: (115-120 lbs per pt report. Per EMR: Per EMR: 121 lb 9.6 oz on 12/11/20; 119 lb 6.4 oz on 11/30/20)     · Ideal Body Weight: 110 lbs  · BMI: 22.6  · BMI Categories: Normal Weight (BMI 22.0 to 24.9) age over 72       Nutrition Diagnosis:   · Moderate malnutrition, In context of acute illness or injury related to inadequate protein-energy intake(suspect inadequate oral intake) as evidenced by mild muscle loss(-24% weight loss in 3 weeks)    Nutrition Interventions:   Food and/or Nutrient Delivery:  Continue Current Diet, Start Oral Nutrition Supplement, Vitamin Supplement  Nutrition Education/Counseling:  Education initiated(Encouraged po intake of meals and ONS at best effort)   Coordination of Nutrition Care:  Continue to monitor while inpatient    Goals:  Pt will consume 75% or more of meals during LOS       Nutrition Monitoring and Evaluation:   Behavioral-Environmental Outcomes:  None Identified   Food/Nutrient Intake Outcomes:  Vitamin/Mineral Intake, Supplement Intake, Food and Nutrient Intake  Physical Signs/Symptoms Outcomes:  Biochemical Data, Weight, Nutrition Focused Physical Findings, Fluid Status or Edema     Discharge Planning:     Too soon to determine     Electronically signed by Adelaide Kenyon RD, LD on 12/23/20 at 2:46 PM EST    Contact: [phone]

## 2020-12-23 NOTE — PROGRESS NOTES
[address] [place] [address]  Occupational Therapy  Daily Note  Time:   Time In: 6144  Time Out: 0825  Timed Code Treatment Minutes: 30 Minutes  Minutes: 30          Date: 2020  Patient Name: Vitor Kirby,   Gender: female      Room: 3B-27/027-A  MRN: [id number]  : 1931  (80 y.o.)  Referring Practitioner: LISA Baeza PA-C  Diagnosis: Cardiac Arrest  Additional Pertinent Hx: Per MD note: 80 y.o. female presents without any major cardiac history of 2 weeks of worsening sob, and chest pain, /10. Pt came in for scheduled heart cath 2020, s/p heart cath was a code blue. s/p emgerent TAVR 2020    Restrictions/Precautions:  Restrictions/Precautions: Fall Risk  Position Activity Restriction  Other position/activity restrictions: s/p TAVR on 2020     SUBJECTIVE: Pt. Resting in bed side chair upon arrival.  Pt. Agreeable to OT treatment session this AM.    PAIN: Pt. Denies pain at this time    COGNITION: Slow Processing, Decreased Insight, Decreased Problem Solving and Decreased Safety Awareness    ADL:   Grooming: Contact Guard Assistance. to complete oral care and hair care with RW for support  Bathing: Stand By Assistance, [address], with set-up and with verbal cues . SBA for seated componets, CGA for standing components. Upper Extremity Dressing: Minimal Assistance and with set-up. to don hospital gown  Lower Extremity Dressing: Stand By Assistance, with set-up and with verbal cues . to don/[facility] socks  Toileting: Contact Guard Assistance. RW  Toilet Transfer: [address] Janett Hodgson BALANCE:  Standing Balance: Contact Guard Assistance. with RW for support    BED MOBILITY:  Not Tested    TRANSFERS:  Sit to Stand:  Contact Guard Assistance. Stand to Sit: Contact Guard Assistance. FUNCTIONAL MOBILITY:  Assistive Device: Rolling Walker  Assist Level:  Contact Guard Assistance. Distance: To and from bathroom and and in hallway required cues for safety with RW placement and use           ASSESSMENT:     Activity Tolerance:  Patient tolerance of  treatment: fair. Discharge Recommendations: IP Rehab   Equipment Recommendations: Other: Monitor pending progress  Plan: Times per week: 5x  Current Treatment Recommendations: Functional Mobility Training, Endurance Training, Safety Education & Training, Self-Care / ADL, Balance Training, Cognitive Reorientation    Patient Education  Patient Education: Role of OT, ADL's, Home Safety, Importance of Increasing Activity and Assistive Device Safety and safety with functional mobility and transfers. Goals  Short term goals  Time Frame for Short term goals: until discharge  Short term goal 1: Complete various t/fs including toilet with S & 0-2 vcs for safety  Short term goal 2: Complete mobility to/from bathroom + into hallways with S, RW, & 0-2 vcs for safety  Short term goal 3: Complete BADL routine with S & 0-2 vcs for safety  Short term goal 4: Complete 4-5 min dynamic standing task with S for increased ease of returning to meal prep tasks at home  Long term goals  Time Frame for Long term goals : No LTG set d/t short ELOS    Following session, patient left in safe position with all fall risk precautions in place.

## 2020-12-23 NOTE — PROGRESS NOTES
Awaiting precert for inpatient rehab. Not returned at this time. Patient remains a good candidate for inpatient rehab. Will continue to follow.     Annabelle Schneider MD

## 2020-12-23 NOTE — CARE COORDINATION
DISASTER CHARTING    12/23/20, 9:22 AM EST    DISCHARGE ONGOING EVALUATION:     [facility] day: 5  Location: 3B-27/027-A Reason for admit: Cardiac arrest [facility]) [I46.9]   Barriers to Discharge: Lindsay Watkins for IP Rehab. PCP: Ronny Mckeon MD  Patient Goals/Plan/Treatment Preferences: Call placed to [facility], no answer yet from pt's insurance regarding precert.

## 2020-12-23 NOTE — PROGRESS NOTES
PHASE 1 CARDIAC REHABILITATION   CABG, AVR, MVR, TVR, AMI, PCI's  Exercise Physiologists      Treatment Length (l: long, n: normal, s: short): Normal. Pt has been up with therapy. No exercise. Education only. Treatment Length Note:   Vitals Stable?: Yes. If No:     Any ECG-Rhythm Issues?: No.  If Yes:   Transfers (bc: Bed to Chair, cb: Chair to Bed): pt sitting up in chair  Strengthening/ROM Treatment Exercises: Step     FITT:     Frequency: 2 x per day   Intensity: <15 RPE, <120bpm   Time: >30-60 seconds longer or >20% increase in distance each walk   Type: Bed/Chair Exercises/stationary marching or ambulation    Aerobic treatment: Na, pt ambulated with PT and OT. Discharge education only. Gait (i: Independent, s: Steady, u: Unsteady):   Assists:   Patient tolerated treatment (w: well, f: fair, p: poor):   Goals:    Short term:  Progression on each aerobic treatment. Long term: Independent ambulation and discharge. Maisha Cordova is to follow sternal precautions. Will learn techniques for getting in and out of bed/chairs/car without using the arms. Using stairs will be addressed if applicable. Home activity instructions (Frequency, Intensity, Time, Type), and progression will be addressed prior to discharge (unless Maisha Cordova goes to TCU or an ECF where they will follow PT/OT protocols). Notes: Call light left within reach. Education given: Maisha Cordova is interested in participating in cardiac rehab here at [address] [place] [address]. She hopes to be transferred to inpatient rehab for a few days. Will call her when she gets home to schedule evaluation.    Phase II Information (Given upon Discharge):

## 2020-12-23 NOTE — PROGRESS NOTES
[address] [address]  Acute Inpatient Rehab Preadmission Assessment    Patient Name: Indu Meyer        MRN: [id number]    : 1931  (80 y.o.)  Gender: female     Admitted from:[address]  Initial Assessment    Date of admission to the hospital: 2020 12:56 PM  Date patient eligible for admission:2020    Primary Diagnosis: S/P TAVR and Hypoxic brain injury      Did patient have surgery? yes - Transcatheter aortic valve replacement with a Medtronic 26 mm Evolut Pro+ prosthesis       Physicians: Katelyn Godfrey MD, Dr. Shanti Jack, Dr. Landon Stover, Dr. Therese Alonso for clinical complications/co-morbidities:   Past Medical History:   Diagnosis Date    Hyperlipidemia     Hypertension     Osteoporosis     Pneumonia        Financial Information  Primary insurance: Medicare Advantage    Secondary Insurance:  . Has the patient had two or more falls in the past year or any fall with injury in the past year? yes    Did the patient have major surgery during the 100 days prior to admission? yes    Precautions:   falls  Restrictions/Precautions: Fall Risk  Other position/activity restrictions: s/p TAVR on 2020    Isolation Precautions: None       Physiatrist:     Patients Occupation: Retired  Reviewed Lab and Diagnostic reports from Current Admission: Yes    Patients Prior Functional  Level: Prior Function  ADL Assistance: Independent  Homemaking Assistance: Independent  Ambulation Assistance: Independent  Transfer Assistance: Independent  Additional Comments: No AD at home, fully indep PLOF.     Current functional status for upper extremity ADLs: minimal assistance    Current functional status for lower extremity ADLs:  Moderate assistance    Current functional status for bed, chair, wheelchair transfers:  contact guard assistance    Current functional status for toilet transfers: contact guard assistance Current functional status for locomotion: Assistive Device: Rolling Walker  Assist Level:  Contact Guard Assistance. Distance: To and from bathroom and and in hallway required cues for safety with RW placement and use    Current functional status for bladder management: Complete independence    Current functional status for bowel management:Complete independence    Current functional status for comprehension: Minimal contact assistance    Current functional status for expression: Minimal contact assistance    Current functional status for social interaction: Minimal contact assistance    Current functional status for problem solving: Minimal contact assistance    Current functional status for memory: Minimal contact assistance    Expected level of Improvement in Self-Care:  Complete independence    Expected level of Improvement in Sphincter Control:  Complete independence    Expected level of Improvement in Transfers: Complete independence    Expected level of Improvement in Locomotion:  Complete independence    Expected level of Improvement in Communication and Social Cognition: modified independence    Expected length of time to achieve that level of improvement: 2 week    Current rehab issues: ADL dysfunction,bladder management,bowel management,carry over of therapy techniques, discharge planning, disease and co-morbidity management, gait/mobility dysfunction, medication management, nutrition and hydration management,Ongoing assessment of safety, Pain management, Patient and family education, Prevention of secondary complications, Skin Integrity,,cognitive impairment, communication impairment. Required therapy: Physical Therapy, Occupational Therapy and Speech Therapy 3 hours per day, 5-6 days per week. Recreational Therapy 1 hour per week.     Expected Discharge Destination: Home    Expected Post Discharge Treatments: Out Patient    Other information relevant to the care needs: Acute Inpatient Rehabilitation Disclosure Statement provided to patient. Patient verbalized understanding. I have reviewed and concur with the findings and results of the pre-admission screening assessment completed by the Inpatient Rehabilitation Admissions Coordinator.     Eyal Rios MD

## 2020-12-23 NOTE — DISCHARGE SUMMARY
Structural Heart/Cardiology Discharge Summary       Name:  Zina Crisostomo  YOB: 1931  Medical Record Number:  [id number]    Date of Admission:  12/18/2020  Date of Discharge:  12/23/2020    Admitting physician: Lavinia Stewart MD  Discharge to: Discharge/Readmit  Condition at d/c: Stable    Hospital Problem List:  Patient Active Problem List   Diagnosis    HTN (hypertension)    Hyperlipidemia    Osteoporosis    Allergic rhinitis    IBS (irritable bowel syndrome)    Osteoarthrosis involving multiple sites    Medication monitoring encounter    Postmenopausal bone loss    Anemia    Right hand weakness, atrophy of thumb muscle.  Chest pressure  on exertion    SOB (shortness of breath) on exertion    Chronic congestive heart failure (HCC)    Systolic ejection murmur 5/6 best aortic area    Pansystolic murmur- best tricuspid area 5/6    Severe aortic stenosis    Cardiomyopathy ([facility]    S/P CATH - LHC AND RHC- LM-OSTAIL 10% STENOSIS, LAD-PATENT, LCX-P, RCA-P, ; RHC PAP 55/26 MEAN 38 MMHG,PCWP 26 MMHG,- TO BE SCHEDULED FOR TAVR  ASAP WITH DR. CARTAGENA    S/P right and left heart catheterization    Cardiac arrest ([facility]    Moderate malnutrition ([facility]       Procedures: TAVR    Hospital Course: Underwent successful TAVR on 12/19/20. Hgb remained stable post procedure. No rhythm issues post-procedure. The pt presented yesterday for an outpatient cath. Cath was normal. Following the cath the pt became dizzy and coded. She was stabllized and transferred to the ICU.  Cath showed the following      \" Procedure Summary   left heart cath   LM-OSTIAL 10% STENOSIS OSTIAL   LAD-PATENT   LCX- PATENT   RCA - MPATENT      ATTEMPTED TO CROSS THE AORTIC VALVE      RIGHT HEART CATH   PRESSURE   PA 55/26 MEAN 38 MMHG   RV 56/3 MEAN 11 MMHG   RA 14/9 MEAN 8 MMHG   PCWP 26 MMHG   NO STEP UP ON O2 SATURATION\"    Her last ECHO was obtained on 12/02/20 and showed EF 40-45%. There is severe/critical/ aortic stenosis with valve area of 0.5 to 0.6 sq   cm. The maximum aortic valve gradient is 125 mmHg, the mean gradient is 84mmHg, and the peak velocity is 5.6 m/s. Discharge physical examination:   Vitals:    12/23/20 0505   BP: (!) 127/58   Pulse: 76   Resp: 18   Temp: 97.1 °F (36.2 °C)   SpO2: 96%     General Appearance: alert ,conversing, in no acute distress  Cardiovascular: normal rate, regular rhythm, normal S1 and S2, no murmurs, rubs, clicks, or gallops  Pulmonary/Chest: clear to auscultation bilaterally- no wheezes, rales or rhonchi, normal air movement, no respiratory distress  Neurological: alert, oriented, normal speech, no focal findings or movement disorder noted. Pulses: Bilateral radial, femoral, DP, and PT pulses noted  Lower Extremity: No edema noted. Groin incisions healing appropriately-No signs of infection or hematoma. Discharge Medications:         Medication List      CHANGE how you take these medications    dicyclomine 10 MG capsule  Commonly known as: BENTYL  Take 1 capsule by mouth 4 times daily (before meals and nightly). What changed: additional instructions     furosemide 20 MG tablet  Commonly known as: LASIX  Take 0.5 tablets by mouth daily  What changed: how much to take     potassium chloride 10 MEQ extended release tablet  Commonly known as: KLOR-CON M  Take 1 tablet by mouth daily  What changed:   · medication strength  · how much to take        CONTINUE taking these medications    amLODIPine 5 MG tablet  Commonly known as: NORVASC     aspirin 81 MG EC tablet     FISH OIL OMEGA-3 PO     IRON PO     loratadine 10 MG tablet  Commonly known as: CLARITIN     lovastatin 10 MG tablet  Commonly known as: MEVACOR  Take 1 tablet by mouth nightly.      * OCUVITE PO     * Multi Vitamin/Minerals Tabs     vitamin D 1.25 MG (40201 UT) Caps capsule  Commonly known as: ERGOCALCIFEROL     ZINC PO * This list has 2 medication(s) that are the same as other medications prescribed for you. Read the directions carefully, and ask your doctor or other care provider to review them with you. STOP taking these medications    piroxicam 20 MG capsule  Commonly known as: Feldene           Where to Get Your Medications      These medications [id number] [address] [phone]  [address], [facility] [address]    Phone: [phone]   · furosemide 20 MG tablet  · potassium chloride 10 MEQ extended release tablet             Follow Up Plan  1. Follow up with Cardiology in 1 week for incision check and post TAVR f/u.   2. Follow up with primary care provider in 1 week  3. Continue DAPT   4. Pt is fully agreeable to discharge plans. All questions were thoroughly answered.      Electronically signed by Reba Goodell, PA-C on 12/23/2020 at 8:24 AM

## 2020-12-23 NOTE — PROGRESS NOTES
[address] [address]  INPATIENT SPEECH THERAPY  [facility] CCU-STEPDOWN 3B  DAILY NOTE    TIME   SLP Individual Minutes  Time In: 3390  Time Out: 0860  Minutes: 38  Timed Code Treatment Minutes: 38 Minutes       Date: 2020  Patient Name: Rui Purdy      CSN: [id number]   : 1931  (80 y.o.)  Gender: female   Referring Physician:  Lucy Gibbs PA-C  Diagnosis: Cardiac arrest  Secondary Diagnosis: Cognitive linguistic deficits   Precautions: Fall risk  Current Diet: Regular with thin liquids  Swallowing Strategies: Standard Universal Swallow Precautions  Date of Last MBS: Not Applicable    Pain:  No pain reported. Subjective:  Pt seen sitting upright in chair; alert and pleasant. Patient pleasantly engage and anticipating IP Rehab      Short-Term Goals:  SHORT TERM GOAL #1:  Goal 1: Pt will complete functional immediate/delayed recall and working memory tasks (with focus on compensatory strategies) with 70% accuracy, mod cues to improve retention of pertinent information. INTERVENTIONS: Orientation: /6 independent    5-unit recall  Topic: Grocery shopping -head of lettuce, bananas, milk, butter, bread   Strategies: writing information down, repetition/rehearsal (x3 trials). Association/visualization    -Immediate memory: 5/5 independent   -Delay x15 minutes: 3/5 independent, 1/5 min prompts, 1/5 mod cues   *ongoing concerns for functional carryover skills/retention of novel information     SHORT TERM GOAL #2:  Goal 2: Pt will complete higher level problem solving and verbal/visual reasoning tasks with 80% accuracy, min cues to assist with independence for ADL contribution. INTERVENTIONS: DNT d/t focus on additional STGs    SHORT TERM GOAL #3:  Goal 3: Pt will complete moderately complex thought organization and executive functioning tasks (time, money/math/finances, medications, home-related) with 80% accuracy, min cues to maximize IADL completion per PLOF. Activity Tolerance:  Patient tolerance of  treatment: good. Cooperative with ST and POC. Assessment/Plan: Patient progressing toward established goals. Continues to require skilled care of licensed speech pathologist to progress toward achievement of established goals and plan of care. .     Plan for Next Session: delayed recall, reasoning, higher level comprehension        KENY Aguirre 23

## 2020-12-24 ENCOUNTER — HOSPITAL ENCOUNTER (INPATIENT)
Age: 85
LOS: 7 days | Discharge: HOME HEALTH | DRG: 092 | End: 2020-12-31
Attending: PHYSICAL MEDICINE & REHABILITATION | Admitting: PHYSICAL MEDICINE & REHABILITATION
Payer: MEDICARE

## 2020-12-24 VITALS
DIASTOLIC BLOOD PRESSURE: 60 MMHG | OXYGEN SATURATION: 96 % | HEART RATE: 92 BPM | SYSTOLIC BLOOD PRESSURE: 131 MMHG | BODY MASS INDEX: 22.56 KG/M2 | HEIGHT: 62 IN | TEMPERATURE: 98.4 F | WEIGHT: 122.6 LBS | RESPIRATION RATE: 18 BRPM

## 2020-12-24 PROBLEM — G93.1 HYPOXIC ENCEPHALOPATHY (HCC): Status: ACTIVE | Noted: 2020-12-24

## 2020-12-24 LAB
EKG ATRIAL RATE: 80 BPM
EKG P AXIS: 46 DEGREES
EKG P-R INTERVAL: 180 MS
EKG Q-T INTERVAL: 334 MS
EKG QRS DURATION: 100 MS
EKG QTC CALCULATION (BAZETT): 385 MS
EKG T AXIS: 129 DEGREES
EKG VENTRICULAR RATE: 80 BPM

## 2020-12-24 PROCEDURE — 6370000000 HC RX 637 (ALT 250 FOR IP): Performed by: FAMILY MEDICINE

## 2020-12-24 PROCEDURE — 6370000000 HC RX 637 (ALT 250 FOR IP): Performed by: PHYSICIAN ASSISTANT

## 2020-12-24 PROCEDURE — 6370000000 HC RX 637 (ALT 250 FOR IP): Performed by: INTERNAL MEDICINE

## 2020-12-24 PROCEDURE — 1180000000 HC REHAB R&B

## 2020-12-24 PROCEDURE — 97110 THERAPEUTIC EXERCISES: CPT

## 2020-12-24 PROCEDURE — 97116 GAIT TRAINING THERAPY: CPT

## 2020-12-24 PROCEDURE — 93005 ELECTROCARDIOGRAM TRACING: CPT | Performed by: INTERNAL MEDICINE

## 2020-12-24 PROCEDURE — 6370000000 HC RX 637 (ALT 250 FOR IP): Performed by: PHYSICAL MEDICINE & REHABILITATION

## 2020-12-24 PROCEDURE — 93010 ELECTROCARDIOGRAM REPORT: CPT | Performed by: NUCLEAR MEDICINE

## 2020-12-24 PROCEDURE — 99222 1ST HOSP IP/OBS MODERATE 55: CPT | Performed by: PHYSICAL MEDICINE & REHABILITATION

## 2020-12-24 PROCEDURE — 2580000003 HC RX 258: Performed by: PHYSICIAN ASSISTANT

## 2020-12-24 PROCEDURE — 94761 N-INVAS EAR/PLS OXIMETRY MLT: CPT

## 2020-12-24 RX ORDER — DICYCLOMINE HYDROCHLORIDE 10 MG/1
10 CAPSULE ORAL 2 TIMES DAILY
Status: DISCONTINUED | OUTPATIENT
Start: 2020-12-24 | End: 2020-12-31 | Stop reason: HOSPADM

## 2020-12-24 RX ORDER — POLYETHYLENE GLYCOL 3350 17 G/17G
17 POWDER, FOR SOLUTION ORAL DAILY PRN
Status: DISCONTINUED | OUTPATIENT
Start: 2020-12-24 | End: 2020-12-31 | Stop reason: HOSPADM

## 2020-12-24 RX ORDER — M-VIT,TX,IRON,MINS/CALC/FOLIC 27MG-0.4MG
1 TABLET ORAL DAILY
Status: DISCONTINUED | OUTPATIENT
Start: 2020-12-25 | End: 2020-12-31 | Stop reason: HOSPADM

## 2020-12-24 RX ORDER — ONDANSETRON 4 MG/1
4 TABLET, FILM COATED ORAL EVERY 8 HOURS PRN
Status: DISCONTINUED | OUTPATIENT
Start: 2020-12-24 | End: 2020-12-31 | Stop reason: HOSPADM

## 2020-12-24 RX ORDER — ACETAMINOPHEN 500 MG
500 TABLET ORAL EVERY 6 HOURS PRN
Status: CANCELLED | OUTPATIENT
Start: 2020-12-24

## 2020-12-24 RX ORDER — ATORVASTATIN CALCIUM 10 MG/1
10 TABLET, FILM COATED ORAL NIGHTLY
Status: DISCONTINUED | OUTPATIENT
Start: 2020-12-24 | End: 2020-12-31 | Stop reason: HOSPADM

## 2020-12-24 RX ORDER — FAMOTIDINE 20 MG/1
20 TABLET, FILM COATED ORAL DAILY
Status: DISCONTINUED | OUTPATIENT
Start: 2020-12-24 | End: 2020-12-31 | Stop reason: HOSPADM

## 2020-12-24 RX ORDER — ASPIRIN 81 MG/1
81 TABLET ORAL DAILY
Status: DISCONTINUED | OUTPATIENT
Start: 2020-12-25 | End: 2020-12-31 | Stop reason: HOSPADM

## 2020-12-24 RX ORDER — ACETAMINOPHEN 500 MG
500 TABLET ORAL EVERY 6 HOURS PRN
Status: DISCONTINUED | OUTPATIENT
Start: 2020-12-24 | End: 2020-12-31 | Stop reason: HOSPADM

## 2020-12-24 RX ORDER — FUROSEMIDE 40 MG/1
40 TABLET ORAL DAILY
Status: DISCONTINUED | OUTPATIENT
Start: 2020-12-25 | End: 2020-12-31 | Stop reason: HOSPADM

## 2020-12-24 RX ORDER — CLOPIDOGREL BISULFATE 75 MG/1
75 TABLET ORAL DAILY
Status: DISCONTINUED | OUTPATIENT
Start: 2020-12-25 | End: 2020-12-31 | Stop reason: HOSPADM

## 2020-12-24 RX ORDER — ONDANSETRON 4 MG/1
4 TABLET, FILM COATED ORAL EVERY 8 HOURS PRN
Status: CANCELLED | OUTPATIENT
Start: 2020-12-24

## 2020-12-24 RX ORDER — POTASSIUM CHLORIDE 20 MEQ/1
20 TABLET, EXTENDED RELEASE ORAL DAILY
Status: DISCONTINUED | OUTPATIENT
Start: 2020-12-25 | End: 2020-12-31 | Stop reason: HOSPADM

## 2020-12-24 RX ORDER — SENNA PLUS 8.6 MG/1
1 TABLET ORAL NIGHTLY PRN
Status: DISCONTINUED | OUTPATIENT
Start: 2020-12-24 | End: 2020-12-31 | Stop reason: HOSPADM

## 2020-12-24 RX ADMIN — DICYCLOMINE HYDROCHLORIDE 10 MG: 10 CAPSULE ORAL at 09:38

## 2020-12-24 RX ADMIN — FUROSEMIDE 40 MG: 40 TABLET ORAL at 09:37

## 2020-12-24 RX ADMIN — METOPROLOL TARTRATE 25 MG: 25 TABLET ORAL at 21:39

## 2020-12-24 RX ADMIN — MULTIPLE VITAMINS W/ MINERALS TAB 1 TABLET: TAB at 09:37

## 2020-12-24 RX ADMIN — POTASSIUM CHLORIDE 20 MEQ: 1500 TABLET, EXTENDED RELEASE ORAL at 09:37

## 2020-12-24 RX ADMIN — ATORVASTATIN CALCIUM 10 MG: 10 TABLET, FILM COATED ORAL at 21:39

## 2020-12-24 RX ADMIN — FAMOTIDINE 20 MG: 20 TABLET, FILM COATED ORAL at 15:02

## 2020-12-24 RX ADMIN — ASPIRIN 81 MG: 81 TABLET, COATED ORAL at 09:38

## 2020-12-24 RX ADMIN — METOPROLOL TARTRATE 25 MG: 25 TABLET ORAL at 09:37

## 2020-12-24 RX ADMIN — CLOPIDOGREL BISULFATE 75 MG: 75 TABLET ORAL at 09:38

## 2020-12-24 RX ADMIN — DICYCLOMINE HYDROCHLORIDE 10 MG: 10 CAPSULE ORAL at 21:39

## 2020-12-24 RX ADMIN — Medication 10 ML: at 09:37

## 2020-12-24 RX ADMIN — ACETAMINOPHEN 500 MG: 500 TABLET ORAL at 02:11

## 2020-12-24 RX ADMIN — ACETAMINOPHEN 500 MG: 500 TABLET ORAL at 21:39

## 2020-12-24 RX ADMIN — ACETAMINOPHEN 500 MG: 500 TABLET ORAL at 09:37

## 2020-12-24 ASSESSMENT — PAIN SCALES - GENERAL
PAINLEVEL_OUTOF10: 6
PAINLEVEL_OUTOF10: 0
PAINLEVEL_OUTOF10: 6

## 2020-12-24 ASSESSMENT — PAIN DESCRIPTION - FREQUENCY
FREQUENCY: CONTINUOUS

## 2020-12-24 ASSESSMENT — PAIN - FUNCTIONAL ASSESSMENT
PAIN_FUNCTIONAL_ASSESSMENT: ACTIVITIES ARE NOT PREVENTED
PAIN_FUNCTIONAL_ASSESSMENT: ACTIVITIES ARE NOT PREVENTED

## 2020-12-24 ASSESSMENT — PAIN DESCRIPTION - ONSET: ONSET: ON-GOING

## 2020-12-24 ASSESSMENT — PAIN DESCRIPTION - DIRECTION: RADIATING_TOWARDS: NO

## 2020-12-24 ASSESSMENT — PAIN DESCRIPTION - PROGRESSION
CLINICAL_PROGRESSION: NOT CHANGED
CLINICAL_PROGRESSION: NOT CHANGED

## 2020-12-24 ASSESSMENT — PAIN DESCRIPTION - DESCRIPTORS
DESCRIPTORS: ACHING;SHARP
DESCRIPTORS: ACHING
DESCRIPTORS: ACHING;SHARP

## 2020-12-24 ASSESSMENT — PAIN DESCRIPTION - LOCATION: LOCATION: RIB CAGE

## 2020-12-24 ASSESSMENT — PAIN DESCRIPTION - PAIN TYPE: TYPE: ACUTE PAIN

## 2020-12-24 ASSESSMENT — PAIN DESCRIPTION - ORIENTATION: ORIENTATION: LEFT

## 2020-12-24 NOTE — CARE COORDINATION
12/24/20, 9:27 AM EST    Patient goals/plan/ treatment preferences discussed by  and . Patient goals/plan/ treatment preferences reviewed with patient/ family. Patient/ family verbalize understanding of discharge plan and are in agreement with goal/plan/treatment preferences. Understanding was demonstrated using the teach back method. AVS provided by RN at time of discharge, which includes all necessary medical information pertaining to the patients current course of illness, treatment, post-discharge goals of care, and treatment preferences. IMM Letter  IMM Letter given to Patient/Family/Significant other/Guardian/POA/by[de-identified]   IMM Letter date given[de-identified] 12/23/20  IMM Letter time given[de-identified] Angela Guevara will be discharged to Inpatient Rehab today.

## 2020-12-24 NOTE — PROGRESS NOTES
[facility]  Acute Inpatient Rehab Preadmission Assessment     Patient Name: Tara Gamez        MRN:   [id number]    : 1931  (80 y.o.)  Gender: female      Admitted from:[address]  Updated Assessment     Date of admission to the hospital: 2020 12:56 PM  Date patient eligible for admission:2020     Primary Diagnosis: S/P TAVR and Hypoxic brain injury      Did patient have surgery? yes - Transcatheter aortic valve replacement with a Medtronic 26 mm Evolut Pro+ prosthesis        Physicians: Cecile Diaz MD, Dr. Lexy Wise, Dr. Sam Gandhi, Dr. Jeannette Ramsey for clinical complications/co-morbidities:   Past Medical History   Past Medical History:   Diagnosis Date    Hyperlipidemia      Hypertension      Osteoporosis      Pneumonia              Financial Information  Primary insurance: Medicare Advantage     Secondary Insurance:  .     Has the patient had two or more falls in the past year or any fall with injury in the past year? yes     Did the patient have major surgery during the 100 days prior to admission? yes     Precautions:   falls  Restrictions/Precautions: Fall Risk  Other position/activity restrictions: s/p TAVR on 2020    Isolation Precautions: None                  Physiatrist:      Patients Occupation: Retired  Reviewed Lab and Diagnostic reports from Current Admission:  Yes     Patients Prior Functional  Level: Prior Function  ADL Assistance: Independent  Homemaking Assistance: Independent  Ambulation Assistance: Independent  Transfer Assistance: Independent  Additional Comments: No AD at home, fully indep PLOF.     Current functional status for upper extremity ADLs: minimal assistance     Current functional status for lower extremity ADLs:  Moderate assistance     Current functional status for bed, chair, wheelchair transfers:  contact guard assistance     Current functional status for toilet transfers: contact guard assistance    Current functional status for locomotion: [address] [facility]. Distance: To and from bathroom and and in hallway required cues for safety with RW placement and use     Current functional status for bladder management: Complete independence     Current functional status for bowel management:Complete independence     Current functional status for comprehension: Minimal contact assistance     Current functional status for expression: Minimal contact assistance     Current functional status for social interaction: Minimal contact assistance     Current functional status for problem solving: Minimal contact assistance     Current functional status for memory: Minimal contact assistance     Expected level of Improvement in Self-Care:  Complete independence     Expected level of Improvement in Sphincter Control:  Complete independence     Expected level of Improvement in Transfers: Complete independence     Expected level of Improvement in Locomotion:  Complete independence     Expected level of Improvement in Communication and Social Cognition: modified independence     Expected length of time to achieve that level of improvement: 2 week     Current rehab issues: ADL dysfunction,bladder management,bowel management,carry over of therapy techniques, discharge planning, disease and co-morbidity management, gait/mobility dysfunction, medication management, nutrition and hydration management,Ongoing assessment of safety, Pain management, Patient and family education, Prevention of secondary complications, Skin Integrity,,cognitive impairment, communication impairment.     Required therapy: Physical Therapy, Occupational Therapy and Speech Therapy 3 hours per day, 5-6 days per week.   Recreational Therapy 1 hour per week.     Expected Discharge Destination: Home     Expected Post Discharge Treatments: Out Patient     Other information relevant to the care needs:     Acute Inpatient Rehabilitation Disclosure Statement provided to patient. Patient verbalized understanding.      I have reviewed and concur with the findings and results of the pre-admission screening assessment completed by the Inpatient Rehabilitation Admissions Coordinator.     Serafin Zapata MD

## 2020-12-24 NOTE — PROGRESS NOTES
Report called to [facility] on [facility]. Spoke with daughter Anjali Chong and informed her she was going to [id number]. Patient sent with belongings in stable condition.

## 2020-12-24 NOTE — PROGRESS NOTES
Admitted to the Inpatient Rehabilitation Unit via wheelchair. Patient was then oriented to room and unit. Education provided on the rehabilitation routine: three hours of therapy five days per week and dining room for lunch. Explained patients right to have family, representative or physician notified of their admission. Patient has Declined for physician to be notified. Patient has Requested for family/representative to be notified. Admitting medication orders compared with acute stay medications; home medication list reviewed with patient/family. Medication issues identified No  Medication issue: None  If yes, physician notified Dr. Ginger Lucas. Bladder and Bowel Function Assessment:  1. Prior history of bladder problems: frequency, stress incontinence and nocturia  2. Number of pads used per day: 3  3. Frequency of night time voidin times  4. Fluid intake volume and pattern: adequate  5. Last BM: 2020  6. Prior history of bowel problems: Yes   Irritable bowel syndrome     Incontinence      Frequent diarrhea      Constipation      Hemorrhoids      Diverticulitis      Bowel Surgery     Two nurse skin assessment performed by [facility] RN  and  [facility] RN. Care plan was created with patient's input and goals were agreed upon. Admission folder provided with education regarding patients diagnoses, fall prevention, skin care, and M in the box. \"Data Collection Information Summary for Patients in Inpatient Rehabilitation Facilities\" and \"Privacy Act Statement - Health Care Records\" provided. Please refer to the admission navigator for further information.

## 2020-12-24 NOTE — PROGRESS NOTES
[facility]  Individualized Disclosure Statement      Patient: Sienna Zuniga      Scope of Service  [facility] provides 24 hour individualized service to patients with functional limitations due to, but not limited to: stroke, brain injury, spinal cord injury, major multiple trauma, fractures, amputation, and neurological disorders. The [address] [place] [address] provides rehabilitative nursing and medical services as well as physical, occupational, speech, and recreation therapies. [facility] is fully accredited by the Commission on Accreditation of Rehabilitation Facilities (CARF) as a comprehensive provider of rehabilitation services. Patients admitted to the [address] [place] [address] receive a minimum of three hours of therapy per day, at least six days per week, with a revised therapy schedule on weekends and holidays. Physical therapy, occupational therapy, and speech therapy are provided seven days per week including holidays. Other therapeutic services are available on weekends and evenings as needed or scheduled. Intensity of Treatment  Your treatment program will consist of Nursing Care and:  1.5 hours of Physical Therapy, per day  1.5 hours of Occupational Therapy, per day   30-60 minutes of Speech Therapy, per day  1 hour of Recreational Therapy, per week    [address] [address] maintains contracts with most insurance plans. Depending on the type of coverage, the insurance may impose limits on the coverage for rehabilitation care. Coverage is based on the premise that you are able to fully participate in the rehabilitation program and show continued progress. Please verify your own insurance information A copy of this was given to the patient/ family on this date.   Insurance Coverage  Your insurance company has made the following determination relative to the length of your stay: Your estimated length of stay is 14 days   Your insurance Coverage has been verified as follows:   Primary Insurance:[employer] medicare    Deductible:1000 Coverage: active  Secondary Insurance ;secondary insurance policies often cover co-pay amounts, but to ensure payment please contact your insurance company.     Alternative Resources: Please ask the  for more information [phone]

## 2020-12-24 NOTE — H&P
Activity: To/from bathroom  Assist Level: Contact guard assistance  Functional Mobility Comments: unsteadiness noted, Pt reaching out for objects to stabilize self   Balance:  Balance  Sitting Balance: Stand by assistance  Standing Balance: Stand by assistance(static)  Transfers:  Sit to stand: Contact guard assistance  Stand to sit: Stand by assistance     Upper Extremity Assessment:   LUE AROM : WFL  RUE AROM : WFL  LUE Strength  Gross LUE Strength: WFL  RUE Strength  Gross RUE Strength: WFL  Sensation  Overall Sensation Status: WFL     ST:    COGNITION:  Rojas Cognitive Assessment (MOCA) version 7.2 completed.  Pt scored 19/30.  Normal is greater than or equal to 26/30.     Past Medical History:      Diagnosis Date    Hyperlipidemia     Hypertension     Osteoporosis     Pneumonia        Primary care provider: Ayden Underwood MD     Past Surgical History:      Procedure Laterality Date    CARPAL TUNNEL RELEASE Right     ELBOW SURGERY      2013 right elbow    TONSILLECTOMY         Allergies:    Tomato    Current Medications:    Current Facility-Administered Medications: ondansetron (ZOFRAN) tablet 4 mg, 4 mg, Oral, Q8H PRN  [START ON 12/25/2020] aspirin EC tablet 81 mg, 81 mg, Oral, Daily  [START ON 12/25/2020] clopidogrel (PLAVIX) tablet 75 mg, 75 mg, Oral, Daily  [START ON 12/25/2020] enoxaparin (LOVENOX) injection 40 mg, 40 mg, Subcutaneous, Daily  polyethylene glycol (GLYCOLAX) packet 17 g, 17 g, Oral, Daily PRN  atorvastatin (LIPITOR) tablet 10 mg, 10 mg, Oral, Nightly  dicyclomine (BENTYL) capsule 10 mg, 10 mg, Oral, BID  [START ON 12/25/2020] furosemide (LASIX) tablet 40 mg, 40 mg, Oral, Daily  metoprolol tartrate (LOPRESSOR) tablet 25 mg, 25 mg, Oral, BID  [START ON 12/25/2020] potassium chloride (KLOR-CON M) extended release tablet 20 mEq, 20 mEq, Oral, Daily  [START ON 12/25/2020] therapeutic multivitamin-minerals 1 tablet, 1 tablet, Oral, Daily acetaminophen (TYLENOL) tablet 500 mg, 500 mg, Oral, Q6H PRN  famotidine (PEPCID) tablet 20 mg, 20 mg, Oral, Daily  senna (SENOKOT) tablet 8.6 mg, 1 tablet, Oral, Nightly PRN     Social History:  Social History     Socioeconomic History    Marital status:      Spouse name: Not on file    Number of children: Not on file    Years of education: Not on file    Highest education level: Not on file   Occupational History    Not on file   Social Needs    Financial resource strain: Not on file    Food insecurity     Worry: Not on file     Inability: Not on file    Transportation needs     Medical: Not on file     Non-medical: Not on file   Tobacco Use    Smoking status: Never Smoker    Smokeless tobacco: Never Used   Substance and Sexual Activity    Alcohol use: No     Comment: rare    Drug use: No    Sexual activity: Not on file   Lifestyle    Physical activity     Days per week: Not on file     Minutes per session: Not on file    Stress: Not on file   Relationships    Social connections     Talks on phone: Not on file     Gets together: Not on file     Attends Hindu service: Not on file     Active member of club or organization: Not on file     Attends meetings of clubs or organizations: Not on file     Relationship status: Not on file    Intimate partner violence     Fear of current or ex partner: Not on file     Emotionally abused: Not on file     Physically abused: Not on file     Forced sexual activity: Not on file   Other Topics Concern    Not on file   Social History Narrative    Not on file     Living Arrangements: Abbi, independently; x2 step-children in the immediate area to provide supplemental assistance should be required  Work History: Retired  Education Level: Master's degree, special education   Driving Status: Active   Finance [address]; checkbook  Medication [address]; pillbox  ADL's: Independent. Hobbies: Mowing (zero turn ), watching movies  Vision Status: Glasses  Hearing: WFL  Type of Home: House  Home Layout: One level  Home Access: Stairs to enter without rails  Entrance Stairs - Number of Steps: 2-3    Family History:   No family history on file.     Review of Systems:  CONSTITUTIONAL:  negative  EYES:  negative  HEENT:  negative  RESPIRATORY:  positive for  Dyspnea, O2 requirements  CARDIOVASCULAR:  Negative, recent TAVR  GASTROINTESTINAL:  negative for nausea, vomiting, diarrhea and constipation  GENITOURINARY:  negative  SKIN:  negative  HEMATOLOGIC/LYMPHATIC:  + swelling BLE , occasional chronically   MUSCULOSKELETAL:  negative for  pain  NEUROLOGICAL:  positive for gait problems  BEHAVIOR/PSYCH:  negative  System review otherwise negative    Physical Exam:  BP (!) 133/92   Pulse 77   Temp 97.6 °F (36.4 °C) (Oral)   Resp 18   Ht 5' 2\" (1.575 m)   SpO2 96%   BMI 22.42 kg/m²   awake  Orientation:   person, place, time  Mood: within normal limits  Affect: calm  General appearance: Patient is well nourished, well developed, well groomed and in no acute distress, younger than stated age     Memory:   mild deficits  Attention/Concentration: minor tangential  Language:  normal     Cranial Nerves:  cranial nerves II-XII are grossly intact  ROM:  normal  Motor Exam:  Motor exam is symmetrical 4+ out of 5 all extremities bilaterally  Tone:  normal  Muscle bulk: within normal limits  Sensory:  Sensory intact     Heart: normal rate and regular rhythm, murmur  Lungs: clear other than mild diminished at bases  Abdomen: soft, non-tender and non-distended     Skin: warm and dry, no rash or erythema, bandaid to right neck noted, clean  Peripheral vascular: Pulses: Normal upper and lower extremity pulses; Edema: trace    Diagnostics:  Recent Results (from the past 24 hour(s))   EKG 12 lead    Collection Time: 12/24/20  6:06 AM   Result Value Ref Range    Ventricular Rate 80 BPM    Atrial Rate 80 BPM The domains of functional impairment present in this patient which will require an intensive and interdisciplinary rehabilitation environment include self care, mobility, motor dysfunction and cognitive function. Estimated length of stay for this admission 7 days    Anticipated disposition: Home. The potential to achieve that is excellent. The post admission physician evaluation (NISREEN) is consistent with the pre-admission assessment. See above findings to reflect the elements required in the NISREEN. Patient's admitting condition is consistent with the findings of the preadmission assessment by the rehabilitation admissions coordinator.     Blossom Aquino MD

## 2020-12-24 NOTE — CONSULTS
Comprehensive Nutrition Assessment    Type and Reason for Visit:  Initial, Consult(New Rehab Admit)    Nutrition Recommendations/Plan:   *Continue current diet, Ensure Enlive daily and Multivitamin w/minerals daily. Nutrition Assessment: Pt. nutritionally compromised AEB pt with some po intake less than 75% over the past week since initial admit on acute floor. At risk for further nutrition compromise r/t admit with respiratory failure and cardiac arrest and underlying medical conditions (hx hx CAD, Anemia, Impaired glucose tolerance). Nutrition recommendations/interventions as per above. Malnutrition Assessment:  Malnutrition Status: At risk for malnutrition (Comment)    Context:  Acute Illness     Findings of the 6 clinical characteristics of malnutrition:  Energy Intake:  Mild decrease in energy intake (Comment)(pt consuming 51-75% of some meals over the past few days)  Weight Loss:  No significant weight loss     Body Fat Loss:  No significant body fat loss     Muscle Mass Loss:  1 - Mild muscle mass loss Temples (temporalis)  Fluid Accumulation:  No significant fluid accumulation Extremities   Strength:  Not Performed     Nutrition Related Findings: admit d/t heart cath then got dizzy and coded; pt intubated on 12/18; s/p TAVR on 12/19; pt extubated on 12/19. Pt seen; she reports she never gets hungry or has an appetite; pt reports consuming 75% of most meals over the past few days. Pt denies N/V or chewing/swallowing difficulties with food. Last BM x1 on 12/24. Rx includes: Lasix, Multivitamin      Wounds:  None       Current Nutrition Therapies:    Dietary Nutrition Supplements: Standard High Calorie Oral Supplement  DIET GENERAL;     Anthropometric Measures:  · Height: 5' 2\" (157.5 cm)  · Current Body Weight: 122 lb 9.6 oz (55.6 kg)(12/24; +trace edema noted)   · Admission Body Weight: 122 lb 9.6 oz (55.6 kg)(12/24; +trace edema noted) · Usual Body Weight: (115-120 lbs per pt report. Per EMR: Per EMR: 121 lb 9.6 oz on 12/11/20; 119 lb 6.4 oz on 11/30/20)     · Ideal Body Weight: 110 lbs  · BMI: 22.4  · BMI Categories: Normal Weight (BMI 22.0 to 24.9) age over 72       Nutrition Diagnosis:   · Inadequate protein-energy intake related to inadequate protein-energy intake as evidenced by mild muscle loss(some po intake less than 75%)    Nutrition Interventions:   Food and/or Nutrient Delivery:  Continue Current Diet, Continue Oral Nutrition Supplement, Vitamin Supplement  Nutrition Education/Counseling:  Education initiated(Encouraged po intake of meals and ONS at best effort)   Coordination of Nutrition Care:  Continue to monitor while inpatient    Goals:  Pt will consume 75% or more of meals during LOS       Nutrition Monitoring and Evaluation:   Behavioral-Environmental Outcomes:  None Identified   Food/Nutrient Intake Outcomes:  Food and Nutrient Intake, Supplement Intake, Vitamin/Mineral Intake  Physical Signs/Symptoms Outcomes:  Biochemical Data, Weight, Skin, Nutrition Focused Physical Findings, Fluid Status or Edema     Discharge Planning:     Too soon to determine     Electronically signed by Addison Muller RD, LD on 12/24/20 at 2:26 PM EST    Contact: [phone]

## 2020-12-24 NOTE — PROGRESS NOTES
Gait Deviations: Forward Flexed Posture, Slow Oly, Decreased Step Length Bilaterally, Decreased Gait Speed, Decreased Heel Strike Bilaterally, Mild Path Deviations and Unsteady Gait    Balance:  Dynamic Sitting Balance: Modified Independent  Dynamic Standing Balance: Stand By Assistance  Pt completed functional tasks in bathtoom with RW for support and use of grab bars. Pt grpssly steady no LOB    Exercise:  Patient was guided in 1 set(s) 10 reps of exercise to both lower extremities. Ankle pumps, Glut sets, Quad sets, Heelslides and Hip abduction/adduction. Exercises were completed for increased independence with functional mobility. Functional Outcome Measures: Completed  AM-PAC Inpatient Mobility Raw Score : 18  AM-PAC Inpatient T-Scale Score : 43.63    ASSESSMENT:  Assessment: Patient progressing toward established goals. Activity Tolerance:  Patient tolerance of  treatment: good. Pt demos general deconditioning and weakness. Pt would benefot from cont PT at this time to improve these deficits as pt lives alone.       Equipment Recommendations:Equipment Needed: No  Discharge Recommendations:  IP Rehab, Patient would benefit from continued therapy after discharge    Plan: Times per week: 5 X GM  Current Treatment Recommendations: Strengthening, Balance Training, Endurance Training, Functional Mobility Training, Transfer Training, Gait Training, Stair training, Home Exercise Program    Patient Education  Patient Education: Plan of Care, Transfers, Gait, Verbal Exercise Instruction    Goals:  Patient goals : Go to rehab  Short term goals  Time Frame for Short term goals: by discharge  Short term goal 1: supine to sit and return with S to get in and out of bed  Short term goal 2: sit to stand with S to get on and off various surfaces  Short term goal 3: Pt will ambulate with least restrictive device and  feet to walk safely in the home and community distances Short term goal 4: ascend/descend 2-3 steps with SBA to enter home  Long term goals  Time Frame for Long term goals : NA due to short ELOS    Following session, patient left in safe position with all fall risk precautions in place.

## 2020-12-25 ENCOUNTER — APPOINTMENT (OUTPATIENT)
Dept: GENERAL RADIOLOGY | Age: 85
DRG: 092 | End: 2020-12-25
Attending: PHYSICAL MEDICINE & REHABILITATION
Payer: MEDICARE

## 2020-12-25 LAB
ALBUMIN SERPL-MCNC: 3.6 G/DL (ref 3.5–5.1)
ALP BLD-CCNC: 52 U/L (ref 38–126)
ALT SERPL-CCNC: 18 U/L (ref 11–66)
ANION GAP SERPL CALCULATED.3IONS-SCNC: 7 MEQ/L (ref 8–16)
AST SERPL-CCNC: 26 U/L (ref 5–40)
BASOPHILS # BLD: 1.1 %
BASOPHILS ABSOLUTE: 0.1 THOU/MM3 (ref 0–0.1)
BILIRUB SERPL-MCNC: 0.2 MG/DL (ref 0.3–1.2)
BUN BLDV-MCNC: 12 MG/DL (ref 7–22)
CALCIUM SERPL-MCNC: 10 MG/DL (ref 8.5–10.5)
CHLORIDE BLD-SCNC: 106 MEQ/L (ref 98–111)
CO2: 28 MEQ/L (ref 23–33)
CREAT SERPL-MCNC: 0.8 MG/DL (ref 0.4–1.2)
EOSINOPHIL # BLD: 10.9 %
EOSINOPHILS ABSOLUTE: 0.7 THOU/MM3 (ref 0–0.4)
ERYTHROCYTE [DISTWIDTH] IN BLOOD BY AUTOMATED COUNT: 12.7 % (ref 11.5–14.5)
ERYTHROCYTE [DISTWIDTH] IN BLOOD BY AUTOMATED COUNT: 45.7 FL (ref 35–45)
GFR SERPL CREATININE-BSD FRML MDRD: 67 ML/MIN/1.73M2
GLUCOSE BLD-MCNC: 95 MG/DL (ref 70–108)
HCT VFR BLD CALC: 31.6 % (ref 37–47)
HEMOGLOBIN: 10 GM/DL (ref 12–16)
IMMATURE GRANS (ABS): 0.04 THOU/MM3 (ref 0–0.07)
IMMATURE GRANULOCYTES: 0.6 %
LYMPHOCYTES # BLD: 20.7 %
LYMPHOCYTES ABSOLUTE: 1.3 THOU/MM3 (ref 1–4.8)
MCH RBC QN AUTO: 31 PG (ref 26–33)
MCHC RBC AUTO-ENTMCNC: 31.6 GM/DL (ref 32.2–35.5)
MCV RBC AUTO: 97.8 FL (ref 81–99)
MONOCYTES # BLD: 9.5 %
MONOCYTES ABSOLUTE: 0.6 THOU/MM3 (ref 0.4–1.3)
NUCLEATED RED BLOOD CELLS: 0 /100 WBC
PLATELET # BLD: 204 THOU/MM3 (ref 130–400)
PMV BLD AUTO: 11.4 FL (ref 9.4–12.4)
POTASSIUM REFLEX MAGNESIUM: 4.9 MEQ/L (ref 3.5–5.2)
PREALBUMIN: 15.7 MG/DL (ref 20–40)
RBC # BLD: 3.23 MILL/MM3 (ref 4.2–5.4)
REASON FOR REJECTION: NORMAL
REJECTED TEST: NORMAL
SEG NEUTROPHILS: 57.2 %
SEGMENTED NEUTROPHILS ABSOLUTE COUNT: 3.7 THOU/MM3 (ref 1.8–7.7)
SODIUM BLD-SCNC: 141 MEQ/L (ref 135–145)
TOTAL PROTEIN: 5.2 G/DL (ref 6.1–8)
WBC # BLD: 6.4 THOU/MM3 (ref 4.8–10.8)

## 2020-12-25 PROCEDURE — 6370000000 HC RX 637 (ALT 250 FOR IP): Performed by: PHYSICIAN ASSISTANT

## 2020-12-25 PROCEDURE — 6370000000 HC RX 637 (ALT 250 FOR IP): Performed by: FAMILY MEDICINE

## 2020-12-25 PROCEDURE — 80053 COMPREHEN METABOLIC PANEL: CPT

## 2020-12-25 PROCEDURE — 6360000002 HC RX W HCPCS: Performed by: PHYSICIAN ASSISTANT

## 2020-12-25 PROCEDURE — 6370000000 HC RX 637 (ALT 250 FOR IP): Performed by: PHYSICAL MEDICINE & REHABILITATION

## 2020-12-25 PROCEDURE — 85025 COMPLETE CBC W/AUTO DIFF WBC: CPT

## 2020-12-25 PROCEDURE — 36415 COLL VENOUS BLD VENIPUNCTURE: CPT

## 2020-12-25 PROCEDURE — 94760 N-INVAS EAR/PLS OXIMETRY 1: CPT

## 2020-12-25 PROCEDURE — 84134 ASSAY OF PREALBUMIN: CPT

## 2020-12-25 PROCEDURE — 71046 X-RAY EXAM CHEST 2 VIEWS: CPT

## 2020-12-25 PROCEDURE — 1180000000 HC REHAB R&B

## 2020-12-25 RX ADMIN — ACETAMINOPHEN 500 MG: 500 TABLET ORAL at 08:00

## 2020-12-25 RX ADMIN — MULTIPLE VITAMINS W/ MINERALS TAB 1 TABLET: TAB at 08:00

## 2020-12-25 RX ADMIN — METOPROLOL TARTRATE 25 MG: 25 TABLET ORAL at 21:21

## 2020-12-25 RX ADMIN — CLOPIDOGREL BISULFATE 75 MG: 75 TABLET ORAL at 08:00

## 2020-12-25 RX ADMIN — DICYCLOMINE HYDROCHLORIDE 10 MG: 10 CAPSULE ORAL at 08:00

## 2020-12-25 RX ADMIN — ENOXAPARIN SODIUM 40 MG: 40 INJECTION SUBCUTANEOUS at 08:00

## 2020-12-25 RX ADMIN — FAMOTIDINE 20 MG: 20 TABLET, FILM COATED ORAL at 08:00

## 2020-12-25 RX ADMIN — METOPROLOL TARTRATE 25 MG: 25 TABLET ORAL at 08:00

## 2020-12-25 RX ADMIN — ATORVASTATIN CALCIUM 10 MG: 10 TABLET, FILM COATED ORAL at 21:21

## 2020-12-25 RX ADMIN — ACETAMINOPHEN 500 MG: 500 TABLET ORAL at 21:21

## 2020-12-25 RX ADMIN — POTASSIUM CHLORIDE 20 MEQ: 1500 TABLET, EXTENDED RELEASE ORAL at 08:00

## 2020-12-25 RX ADMIN — DICYCLOMINE HYDROCHLORIDE 10 MG: 10 CAPSULE ORAL at 21:21

## 2020-12-25 RX ADMIN — ACETAMINOPHEN 500 MG: 500 TABLET ORAL at 13:58

## 2020-12-25 RX ADMIN — ASPIRIN 81 MG: 81 TABLET, COATED ORAL at 08:00

## 2020-12-25 RX ADMIN — FUROSEMIDE 40 MG: 40 TABLET ORAL at 08:00

## 2020-12-25 ASSESSMENT — PAIN SCALES - GENERAL
PAINLEVEL_OUTOF10: 4
PAINLEVEL_OUTOF10: 2

## 2020-12-25 ASSESSMENT — PAIN DESCRIPTION - DESCRIPTORS: DESCRIPTORS: ACHING

## 2020-12-25 ASSESSMENT — PAIN DESCRIPTION - LOCATION: LOCATION: RIB CAGE

## 2020-12-25 ASSESSMENT — PAIN DESCRIPTION - PAIN TYPE
TYPE: ACUTE PAIN
TYPE: ACUTE PAIN

## 2020-12-25 ASSESSMENT — PAIN DESCRIPTION - ONSET: ONSET: ON-GOING

## 2020-12-25 NOTE — CONSULTS
acetaminophen (TYLENOL) tablet 500 mg, 500 mg, Oral, Q6H PRN  famotidine (PEPCID) tablet 20 mg, 20 mg, Oral, Daily  senna (SENOKOT) tablet 8.6 mg, 1 tablet, Oral, Nightly PRN  Allergies:  Tomato    Social History:   OCCUPATION:  Retired    Family History:   No family history on file. REVIEW OF SYSTEMS:    CONSTITUTIONAL:  positive for  fatigue  EYES:  positive for  glasses  HEENT:  negative for  nasal congestion  RESPIRATORY:  negative  CARDIOVASCULAR:  negative for  chest pain  GASTROINTESTINAL:  negative for abdominal mass  GENITOURINARY:  negative for frequency  INTEGUMENT/BREAST:  negative for rash  HEMATOLOGIC/LYMPHATIC:  negative for petechiae  ALLERGIC/IMMUNOLOGIC:  negative for anaphylaxis  PHYSICAL EXAM:      Vitals:    BP (!) 159/70   Pulse 86   Temp 98.6 °F (37 °C) (Oral)   Resp 16   Ht 5' 2\" (1.575 m)   SpO2 94%   BMI 22.42 kg/m²   Well developed well nourished white female who is awake alert and cooperative laying in bed  Skin atrophic  Membranes moist  Head normocephalic  Neck without mass  Chest symmetrical expansion  Heart S1S2 without murmur  Lungs CTA  Abd soft, non tender, normoactive BS and no mass  Ext without edema  Neuro weak  Psy pleasant    DATA:    Results for Taras Caputo (MRN [id number]) as of 12/24/2020 23:07   Ref.  Range 12/22/2020 09:27   Sodium Latest Ref Range: 135 - 145 meq/L 138   Potassium Latest Ref Range: 3.5 - 5.2 meq/L 4.3   Chloride Latest Ref Range: 98 - 111 meq/L 106   CO2 Latest Ref Range: 23 - 33 meq/L 22 (L)   BUN Latest Ref Range: 7 - 22 mg/dL 11   Creatinine Latest Ref Range: 0.4 - 1.2 mg/dL 0.6   Anion Gap Latest Ref Range: 8.0 - 16.0 meq/L 10.0   Est, Glom Filt Rate Latest Units: ml/min/1.73m2 >90   Glucose Latest Ref Range: 70 - 108 mg/dL 144 (H)   Calcium Latest Ref Range: 8.5 - 10.5 mg/dL 9.7       IMPRESSION/RECOMMENDATIONS:      Active Hospital Problems    Diagnosis Date Noted    Hyp[facility] ([facility] [G93.1] 12/24/2020  Moderate malnutrition ([facility] [E44.0] 12/19/2020     Class: Acute    Cardiomyopathy ([facility] [I42.9] 12/11/2020    Chronic congestive heart failure ([facility] [I50.9] 04/02/1792    Systolic ejection murmur 5/6 best aortic area [R01.1] 11/30/2020    Osteoarthrosis involving multiple sites [M15.9] 01/09/2012

## 2020-12-25 NOTE — PLAN OF CARE
Problem: Falls - Risk of:  Goal: Absence of physical injury  Description: Absence of physical injury  Outcome: Ongoing  Note: Patient remains free from falls this shift gait belt walker and non skid socks utilized during ambulation. Patient uses call light appropriately.

## 2020-12-25 NOTE — PROGRESS NOTES
Patient: Refugio Fragoso  Unit/Bed: 0Y-20/590-F  YOB: 1931  MRN: [id number] Acct: [de-identified]   Admitting Diagnosis: Hypoxic encephalopathy ([facility] [address] [facility] [G93.1]  Admit Date:  12/24/2020  Hospital Day: 1    Assessment:     Active Problems:    Osteoarthrosis involving multiple sites    Chronic congestive heart failure (HCC)    Systolic ejection murmur 5/6 best aortic area    Cardiomyopathy ([address]    Moderate malnutrition ([address]    Hypoxic encephalopathy ([address]  Resolved Problems:    * No resolved hospital problems. *      Plan:     Continue present care  We discussed how the CXR could show an appearance of COPD without actually having it. Subjective:     Patient has no complaint of CP, SOB, GI upset or voiding troubles. .   Medication side effects: none    Scheduled Meds:   aspirin  81 mg Oral Daily    clopidogrel  75 mg Oral Daily    enoxaparin  40 mg Subcutaneous Daily    atorvastatin  10 mg Oral Nightly    dicyclomine  10 mg Oral BID    furosemide  40 mg Oral Daily    metoprolol tartrate  25 mg Oral BID    potassium chloride  20 mEq Oral Daily    therapeutic multivitamin-minerals  1 tablet Oral Daily    famotidine  20 mg Oral Daily     Continuous Infusions:  PRN Meds:ondansetron, polyethylene glycol, acetaminophen, senna    Review of Systems  Pertinent items are noted in HPI. Objective:     Patient Vitals for the past 8 hrs:   SpO2   12/25/20 0933 96 %     I/O last 3 completed shifts: In: 200 [P.O.:980]  Out: -   No intake/output data recorded.     /64   Pulse 71   Temp 96.6 °F (35.9 °C) (Oral)   Resp 18   Ht 5' 2\" (1.575 m)   Wt 123 lb (55.8 kg)   SpO2 96%   BMI 22.50 kg/m²     /64   Pulse 71   Temp 96.6 °F (35.9 °C) (Oral)   Resp 18   Ht 5' 2\" (1.575 m)   Wt 123 lb (55.8 kg)   SpO2 96%   BMI 22.50 kg/m²   General appearance: alert, appears stated age and cooperative  Head: Normocephalic, without obvious abnormality, atraumatic Lungs: clear to auscultation bilaterally  Heart: regular rate and rhythm, S1, S2 normal, no murmur, click, rub or gallop  Abdomen: soft, non-tender; bowel sounds normal; no masses,  no organomegaly  Extremities: extremities normal, atraumatic, no cyanosis or edema  Skin: Skin color, texture, turgor normal. No rashes or lesions  Neurologic: weak    Data Review:     Results for Analisa Duran (MRN [id number]) as of 12/25/2020 17:01   Ref.  Range 12/25/2020 06:34   Sodium Latest Ref Range: 135 - 145 meq/L 141   Potassium Latest Ref Range: 3.5 - 5.2 meq/L 4.9   Chloride Latest Ref Range: 98 - 111 meq/L 106   CO2 Latest Ref Range: 23 - 33 meq/L 28   BUN Latest Ref Range: 7 - 22 mg/dL 12   Creatinine Latest Ref Range: 0.4 - 1.2 mg/dL 0.8   Anion Gap Latest Ref Range: 8.0 - 16.0 meq/L 7.0 (L)   Est, Glom Filt Rate Latest Units: ml/min/1.73m2 67 (A)   Glucose Latest Ref Range: 70 - 108 mg/dL 95   Calcium Latest Ref Range: 8.5 - 10.5 mg/dL 10.0   Total Protein Latest Ref Range: 6.1 - 8.0 g/dL 5.2 (L)   Albumin Latest Ref Range: 3.5 - 5.1 g/dL 3.6   Alk Phos Latest Ref Range: 38 - 126 U/L 52   ALT Latest Ref Range: 11 - 66 U/L 18   AST Latest Ref Range: 5 - 40 U/L 26   Bilirubin Latest Ref Range: 0.3 - 1.2 mg/dL 0.2 (L)   Prealbumin Latest Ref Range: 20.0 - 40.0 mg/dl 15.7 (L)   WBC Latest Ref Range: 4.8 - 10.8 thou/mm3 6.4   RBC Latest Ref Range: 4.20 - 5.40 mill/mm3 3.23 (L)   Hemoglobin Quant Latest Ref Range: 12.0 - 16.0 gm/dl 10.0 (L)   Hematocrit Latest Ref Range: 37.0 - 47.0 % 31.6 (L)   MCV Latest Ref Range: 81.0 - 99.0 fL 97.8   MCH Latest Ref Range: 26.0 - 33.0 pg 31.0   MCHC Latest Ref Range: 32.2 - 35.5 gm/dl 31.6 (L)   MPV Latest Ref Range: 9.4 - 12.4 fL 11.4   RDW-CV Latest Ref Range: 11.5 - 14.5 % 12.7   RDW-SD Latest Ref Range: 35.0 - 45.0 fL 45.7 (H)   Platelet Count Latest Ref Range: 130 - 400 thou/mm3 204   Lymphocytes Absolute Latest Ref Range: 1.0 - 4.8 thou/mm3 1.3 Monocytes Absolute Latest Ref Range: 0.4 - 1.3 thou/mm3 0.6   Eosinophils Absolute Latest Ref Range: 0.0 - 0.4 thou/mm3 0.7 (H)   Basophils Absolute Latest Ref Range: 0.0 - 0.1 thou/mm3 0.1   Seg Neutrophils Latest Units: % 57.2   Segs Absolute Latest Ref Range: 1.8 - 7.7 thou/mm3 3.7   Lymphocytes Latest Units: % 20.7   Monocytes Latest Units: % 9.5   Eosinophils Latest Units: % 10.9   Basophils Latest Units: % 1.1   Immature Grans (Abs) Latest Ref Range: 0.00 - 0.07 thou/mm3 0.04   Immature Granulocytes Latest Units: % 0.6     Electronically signed by Virginia Umana MD on 12/25/2020 at 5:00 PM

## 2020-12-25 NOTE — PLAN OF CARE
Care plan reviewed with patient and  verbalize understanding of the plan of care and contribute to goal setting. Problem: Pain:  Description: Pain management should include both nonpharmacologic and pharmacologic interventions. Goal: Control of acute pain  Description: Control of acute pain  12/25/2020 1058 by Zina Longoria RN  Outcome: Completed  12/25/2020 0014 by Evan Ignacio RN  Outcome: Ongoing     Problem: Pain:  Description: Pain management should include both nonpharmacologic and pharmacologic interventions. Goal: Pain level will decrease  Description: Pain level will decrease  12/25/2020 1058 by Zina Longoria RN  Outcome: Met This Shift  Note: Reports relief  12/25/2020 0014 by Evan Ignacio RN  Outcome: Ongoing     Problem: Falls - Risk of:  Goal: Absence of physical injury  Description: Absence of physical injury  12/25/2020 1058 by Zina Longoria RN  Outcome: Completed  12/25/2020 0014 by Evan Ignacio RN  Outcome: Ongoing  Note: Patient remains free from falls this shift gait belt walker and non skid socks utilized during ambulation. Patient uses call light appropriately.      Problem: Falls - Risk of:  Goal: Will remain free from falls  Description: Will remain free from falls  12/25/2020 1058 by Zina Longoria RN  Outcome: Met This Shift  Note: Uses call light  12/25/2020 0014 by Evan Ignacio RN  Outcome: Ongoing     Problem: Skin Integrity:  Goal: Skin integrity will stabilize  Description: Skin integrity will stabilize  12/25/2020 1058 by Zina Longoria RN  Outcome: Met This Shift  Note: Skin intact with bruising fading to left arm  12/25/2020 0014 by Evan Ignacio RN  Outcome: Ongoing     Problem: Discharge Planning:  Goal: Patients continuum of care needs are met  Description: Patients continuum of care needs are met  12/25/2020 1058 by Zina Longoria RN  Outcome: Met This Shift  Note: Needs met  12/25/2020 0014 by Evan Ignacio RN  Outcome: Ongoing

## 2020-12-26 PROCEDURE — 6360000002 HC RX W HCPCS: Performed by: PHYSICIAN ASSISTANT

## 2020-12-26 PROCEDURE — 6370000000 HC RX 637 (ALT 250 FOR IP): Performed by: PHYSICIAN ASSISTANT

## 2020-12-26 PROCEDURE — 6370000000 HC RX 637 (ALT 250 FOR IP): Performed by: PHYSICAL MEDICINE & REHABILITATION

## 2020-12-26 PROCEDURE — 97110 THERAPEUTIC EXERCISES: CPT

## 2020-12-26 PROCEDURE — 97530 THERAPEUTIC ACTIVITIES: CPT

## 2020-12-26 PROCEDURE — 1180000000 HC REHAB R&B

## 2020-12-26 PROCEDURE — 97165 OT EVAL LOW COMPLEX 30 MIN: CPT

## 2020-12-26 PROCEDURE — 94760 N-INVAS EAR/PLS OXIMETRY 1: CPT

## 2020-12-26 PROCEDURE — 92523 SPEECH SOUND LANG COMPREHEN: CPT

## 2020-12-26 PROCEDURE — 6370000000 HC RX 637 (ALT 250 FOR IP): Performed by: FAMILY MEDICINE

## 2020-12-26 PROCEDURE — 97112 NEUROMUSCULAR REEDUCATION: CPT

## 2020-12-26 PROCEDURE — 97535 SELF CARE MNGMENT TRAINING: CPT

## 2020-12-26 PROCEDURE — 97162 PT EVAL MOD COMPLEX 30 MIN: CPT

## 2020-12-26 RX ADMIN — METOPROLOL TARTRATE 25 MG: 25 TABLET ORAL at 11:51

## 2020-12-26 RX ADMIN — FAMOTIDINE 20 MG: 20 TABLET, FILM COATED ORAL at 11:51

## 2020-12-26 RX ADMIN — DICLOFENAC 4 G: 10 GEL TOPICAL at 20:11

## 2020-12-26 RX ADMIN — CLOPIDOGREL BISULFATE 75 MG: 75 TABLET ORAL at 11:51

## 2020-12-26 RX ADMIN — ACETAMINOPHEN 500 MG: 500 TABLET ORAL at 11:52

## 2020-12-26 RX ADMIN — ACETAMINOPHEN 500 MG: 500 TABLET ORAL at 20:11

## 2020-12-26 RX ADMIN — FUROSEMIDE 40 MG: 40 TABLET ORAL at 11:50

## 2020-12-26 RX ADMIN — DICYCLOMINE HYDROCHLORIDE 10 MG: 10 CAPSULE ORAL at 11:51

## 2020-12-26 RX ADMIN — DICYCLOMINE HYDROCHLORIDE 10 MG: 10 CAPSULE ORAL at 20:11

## 2020-12-26 RX ADMIN — DICLOFENAC 4 G: 10 GEL TOPICAL at 16:01

## 2020-12-26 RX ADMIN — ATORVASTATIN CALCIUM 10 MG: 10 TABLET, FILM COATED ORAL at 20:11

## 2020-12-26 RX ADMIN — ENOXAPARIN SODIUM 40 MG: 40 INJECTION SUBCUTANEOUS at 11:51

## 2020-12-26 RX ADMIN — ASPIRIN 81 MG: 81 TABLET, COATED ORAL at 11:51

## 2020-12-26 RX ADMIN — METOPROLOL TARTRATE 25 MG: 25 TABLET ORAL at 20:11

## 2020-12-26 RX ADMIN — MULTIPLE VITAMINS W/ MINERALS TAB 1 TABLET: TAB at 11:51

## 2020-12-26 RX ADMIN — POTASSIUM CHLORIDE 20 MEQ: 1500 TABLET, EXTENDED RELEASE ORAL at 11:51

## 2020-12-26 ASSESSMENT — PAIN DESCRIPTION - LOCATION: LOCATION: RIB CAGE

## 2020-12-26 ASSESSMENT — PAIN DESCRIPTION - FREQUENCY: FREQUENCY: CONTINUOUS

## 2020-12-26 ASSESSMENT — PAIN DESCRIPTION - PROGRESSION: CLINICAL_PROGRESSION: NOT CHANGED

## 2020-12-26 ASSESSMENT — PAIN DESCRIPTION - ORIENTATION: ORIENTATION: LEFT

## 2020-12-26 ASSESSMENT — PAIN SCALES - GENERAL: PAINLEVEL_OUTOF10: 6

## 2020-12-26 NOTE — FLOWSHEET NOTE
[facility] PROGRESS NOTE      Patient: Erskine Mcardle  Room #: 6Y-55/108-F            YOB: 1931  Age: 80 y.o. Gender: female            Admit Date & Time: 12/24/2020 11:30 AM    Assessment:  I attempted to visit the 80year old female patient to provide spiritual care and emotional support during this rounding. At the time entry, patient is sitting in her recliner chair in the room, as her daughter sat in the other chair next to the wall. Patient told me she came in to check a valve and ended found out she needed a special procedure because of her heart. Patient's daughter also told me that patient was so critical she coded but came through well and the heart procedure was done. Patient told me the therapy process is going well but was rough to start with. Patient is calm, approachable. Interventions:  I provided active listening, nurtured hope, emotional support and words of comfort and consolation. Outcomes:  Approachable, engaged in conversation with  and receptive to 's visit. Plan: 1. SC will follow up with continue emotional and spiritual support as needed.      Electronically signed by Kavita Cedillo, on 12/26/2020 at 1:32 PM.  [address] [employer]  [phone]

## 2020-12-26 NOTE — PROGRESS NOTES
Ron Ibanez  Speech  Language  Cognitive Evaluation    SLP Individual Minutes  Time In: 0900  Time Out: 1001  Minutes: 61  Timed Code Treatment Minutes: 0 Minutes       Date: 2020  Patient Name: Sia Felipe      CSN: [id number]   : 1931  (80 y.o.)  Gender: female   Referring Physician:  Dexter Hidalgo MD  Diagnosis: hypoxic encephalopathy   Secondary Diagnosis: cognitive deficits   Precautions: fall risk  History of Present Illness/Injury: Pt presents with the above dx, see physician H&P for further hx. Pt recently admitted to [facility] on . Per chart review, \" She was admitted to [address] [place] [address] on 2020.  Patient presented to hospital for outpatient heart cath with Dr Paty Hong. Patient had proceeded up to bathroom after cath and complained of CP and dizziness. Patient became unresponsive and asystole, CPR for 2 minutes given and patient responded, but with O2 sat 74% with 100% NRB, patient was intubated and taking to ICU.  Patient was taken for TAVR on 2020. \" Pt previously receiving ST interventions in acute care setting to address cognitive deficits. ST to complete cognitive evaluation to assess current level of function and develop comprehensive POC. Past Medical History:   Diagnosis Date    Hyperlipidemia     Hypertension     Osteoporosis     Pneumonia        Pain: No pain reported. Subjective:  Pt sitting upright in recline, alert and pleasant. No family present at this time. SOCIAL HISTORY:   Living Arrangements: Lives at home alone, daughter close by  Work History: Retired  Education Level: Master's in Special Education  Driving Status: Active   Finance Management: Independent  Medication Management: Independent, does NOT use pill box  ADL's: Independent.    Hobbies: Watching TV, working out   Vision Status: WFL, glasses   Hearing: WFL  Type of Home: [employer] Home Layout: One level  Home Access: (typically goes through the garage- no hand rail, can enter through the front door with newly installed hand rail on the left)  Entrance Stairs - Number of Steps: 2-3  Home Equipment: Reacher    ORAL MOTOR:  Facial / Labial WFL    Lingual WFL    Dentition WFL    Velum Not Tested    Vocal Quality WFL    Sensation Not Tested    Cough Not Tested      SPEECH / VOICE:  Speech and Voice appear to be grossly intact for basic and complex daily communication    LANGUAGE:  Receptive:  Receptive language skills appear to be grossly intact for basic daily communication. Complex comprehension (multistep commands, reading comprehension) mildly impaired. Expressive:  Expressive language skills appear to be grossly intact for basic daily communication. Complex expression (I.e., verbal description, complex written expression) mildly impaired; intermittent anomia within conversation. COGNITION:  LANGUAGE/COGNITION:  Completed the Scales of Cognitive and Communicative Ability for Neurorehabilitation with the following results:     Subtests completed:  Oral Expression: . Typical Functioning  Orientation: . Typical Functioning  Memory: 10/19. Moderate Impairment  Speech Comprehension: . Typical Functioning  Reading Comprehension: 10/12. Mild Impairment  Writin/7. Mild Impairment  Attention: 10/16. Moderate Impairment  Problem Solvin/23. Mild Impairment    Typical Functioning = 87-94  Mild Impairment = 69-86  Moderate Impairment = 47-68  Severe Impairment = 0-46     Overall Degree of Severity: 76/94.  Mild Impairment      SWALLOWING:  Current Diet: Regular with thin  WNL    Comprehension: 5 - Patient understands basic needs (hungry/hot/pain)  Expression: 5 - Expresses basic ideas/needs only (hungry/hot/pain)  Social Interaction: 5 - Patient is appropriate with supervision/cues  Problem Solvin - Patient solves simple/routine tasks 75-90%+ Memory: 3 - Patient remembers 50%-74% of the time    RECOMMENDATIONS/ASSESSMENT:  DIAGNOSTIC IMPRESSIONS:  Pt presents with mild-moderate cognitive-linguistic impairments characterized by deficits within recall, working memory, complex reasoning, thought organization, reading comprehension, writing, attention and problem solving, as outlined by total score of 76/94 on the [facility]. Pt does exhibit intermittent anomia with inconsistent use of word finding strategies for improved effective communication. Limited overall insight to cognitive-linguistic deficits. Pt was independent with all ADLs and IADLs PTA, including medication and financial management as well as actively driving. Continued ST interventions are warranted to address the above cognitive-linguistic impairments to permit potential safe return to baseline responsibilities. Rehabilitation Potential: good    EDUCATION:  Learner: Patient  Education:  Reviewed results and recommendations of this evaluation, Reviewed ST goals and Plan of Care and Reviewed recommendations for follow-up  Evaluation of Education: Verbalizes understanding, Demonstrates with assistance and Family not present    PLAN:  Skilled SLP intervention on IP Rehab or TCU 30 minutes per day 5 days per week. Specific interventions for next session may include: functional recal tasks, compensatory strategy training    PATIENT GOAL:    Return to prior level of function. SHORT TERM GOALS:  Short-term Goals  Timeframe for Short-term Goals: 1 week  Goal 1: Pt will complete functional immediate/delayed recall and working memory tasks (with focus on compensatory strategies) with 75% accuracy, mod cues to improve retention of pertinent information. Goal 2: Pt will complete higher level problem solving and verbal/visual reasoning tasks with 80% accuracy, min cues to assist with independence for ADL contribution.

## 2020-12-26 NOTE — PROGRESS NOTES
[address], [address] [facility] - [id number]    Time In: 1027  Time Out: 1130  Timed Code Treatment Minutes: 40 Minutes  Minutes: 63          Date: 2020  Patient Name: Des Rich,  Gender:  female        MRN: [id number]  : 1931  (80 y.o.)      Referring Practitioner: Pedro Chavarria MD  Diagnosis: hypoxic encephalopathy  Additional Pertinent Hx: Per MD note: 80 y.o. female admitted to the inpatient rehabilitation unit on 2020. She was admitted to [facility] on 2020. Patient presented to hospital for outpatient heart cath with Dr Tucker Armas. Patient had proceeded up to bathroom after cath and complained of CP and dizziness. Patient became unresponsive and asystole, CPR for 2 minutes given and patient responded, but with O2 sat 74% with 100% NRB, patient was intubated and taking to ICU. Patient was taken for TAVR on 2020. Pt to IPR: 2020. Restrictions/Precautions:  Restrictions/Precautions: Fall Risk  Position Activity Restriction  Other position/activity restrictions: s/p TAVR on 2020    Subjective:  Chart Reviewed: Yes  Patient assessed for rehabilitation services?: Yes  Family / Caregiver Present: No  Subjective: Pt in bedside chair and was agreeable to PT evaluation and interventions. Post session, pt in bedside chair with all needs in reach, step dtr present in room.     General:  Overall Orientation Status: Within Functional Limits  Follows Commands: Within Functional Limits    Vision: Within Functional Limits    Hearing: Within functional limits         Pain: left trunk/rib pain: 7/10    Social/Functional History:    Lives With: Alone  Type of Home: House  Home Layout: One level  Home Access: (typically goes through the garage- no hand rail, can enter through the front door with newly installed hand rail on the left)  Entrance Stairs - Number of Steps: 2-3 Home Equipment: Reacher     Bathroom Shower/Tub: Walk-in shower  Bathroom Toilet: Standard(has 1 STS & 1 ETS)       ADL Assistance: Independent  Homemaking Assistance: Independent  Ambulation Assistance: Independent  Transfer Assistance: Independent    Active : Yes     Additional Comments: No AD at home, fully indep PLOF. OBJECTIVE:  Range of Motion:  Bilateral Lower Extremity: WFL    Strength:  Bilateral Lower Extremity: Impaired - mildly deconditioned  >3/5 MMT    Balance:  Static Standing Balance: Contact Guard Assistance  Dynamic Standing Balance: Contact Guard Assistance, Minimal Assistance    Bed Mobility:  Rolling to Left: Not tested - secondary to pain  Rolling to Right: Not tested  - secondary to pain  Supine to Sit: Stand By Assistance   Sit to Supine: Stand By Assistance   HOB flat, no rail, increased time to complete transfer    Transfers:  Sit to Stand: Stand By Assistance  Stand to Sit:Stand By Assistance    Ambulation:  Contact Guard Assistance, with cues for safety, with verbal cues , with increased time for completion  Distance: 65ft + short bouts throughout gym  Surface: Level Tile  Device:No Device  Gait Deviations: Forward Flexed Posture, Slow Oly, Decreased Step Length Bilaterally and Decreased Weight Shift Bilaterally  Pt reporting lightheadedness throughout session. Vitals taken prior to mobility which were stable. She notes that she feels mentally fatigued after AM session with speech. Mild unsteadiness noted with ambulation as she fatigued. Exercise:  Patient was guided in 1 set(s) 15 reps of exercise to both lower extremities. Ankle pumps, Hip abduction/adduction, Seated marches and Long arc quads. Exercises were completed for increased independence with functional mobility. Pt instructed in dynamic standing balance task on blue foam with toe tapping various colors and numbers. With more complexity- pt rock's difficulty maintaining balance with Guilherme for safety. Pt with several LOBs throughout task. She also fatigued quickly which adversely affected her balance. Functional Outcome Measures: Completed   [id number]    ASSESSMENT:  Activity Tolerance:  Patient tolerance of  treatment: good. Pt tolerated session well, but notes increased fatigued post session. Treatment Initiated: Treatment and education initiated within context of evaluation. Evaluation time included review of current medical information, gathering information related to past medical, social and functional history, completion of standardized testing, formal and informal observation of tasks, assessment of data and development of plan of care and goals. Treatment time included skilled education and facilitation of tasks to increase safety and independence with functional mobility for improved independence and quality of life. HEP and balance task instructed this date. Pt instructed in several transfers throughout this date with short bouts of gait for improved functional I and safety with mobility. Assessment: Body structures, Functions, Activity limitations: Decreased functional mobility , Decreased endurance, Decreased strength, Decreased balance, Increased pain  Assessment: Pt is s/p TAVR on 12/20. Pt with hypoxic encephalopathy. She demonstrates a decrease in baseline by way of transfers, bedm obility and ambulation secondary to the aforementioned deficits. She will benefit from skilled PT services during admission and post d/c improved functional I and safety with mobility.   Prognosis: Good    REQUIRES PT FOLLOW UP: Yes    Discharge Recommendations:  Discharge Recommendations: IP Rehab, Patient would benefit from continued therapy after discharge    Patient Education: PT Education: Goals, PT Role, General Safety, Gait Training, Plan of Care, Home Exercise Program, Transfer Training, Functional Mobility Training    Equipment Recommendations:       Plan:  Times per week: 5x/wk 90min, 1x/wk 30  Current Treatment Recommendations: Strengthening, Balance Training, Endurance Training, Functional Mobility Training, Transfer Training, Gait Training, Stair training, Home Exercise Program    Goals:  Patient goals : go home  Short term goals  Time Frame for Short term goals: 1 week  Short term goal 1: supine to sit and return with S to get in and out of bed  Short term goal 2: sit to stand with S to get on and off various surfaces  Short term goal 3: Pt will ambulate with least restrictive device and  feet to walk safely in the home and community distances  Long term goals  Time Frame for Long term goals : 3 weeks  Long term goal 1: supine to sit and return with I to get in and out of bed  Long term goal 2: sit to stand with I to get on and off various surfaces  Long term goal 3: Pt will ambulate with least restrictive device and I 350 feet to walk safely in the home and community distances  Long term goal 4: ascend/descend 2-3 steps with I, no UE support to enter home    Following session, patient left in safe position with all fall risk precautions in place.

## 2020-12-26 NOTE — PROGRESS NOTES
[address] [address]  EVALUATION    Time:    Time In: 0715  Time Out: 0845  Timed Code Treatment Minutes: 75 Minutes  Minutes: 90          Date: 2020  Patient Name: Des Rich,   Gender: female      MRN: [id number]  : 1931  (80 y.o.)  Referring Practitioner: Dr. Víctor Soria  Diagnosis: hypoxic encephalopathy  Additional Pertinent Hx: Per MD note: 80 y.o. female admitted to the inpatient rehabilitation unit on 2020. She was admitted to [address] [place] [address] on 2020. Patient presented to hospital for outpatient heart cath with Dr Tucker Armas. Patient had proceeded up to bathroom after cath and complained of CP and dizziness. Patient became unresponsive and asystole, CPR for 2 minutes given and patient responded, but with O2 sat 74% with 100% NRB, patient was intubated and taking to ICU. Patient was taken for TAVR on 2020. Restrictions/Precautions:  Restrictions/Precautions: Fall Risk  Position Activity Restriction  Other position/activity restrictions: s/p TAVR on 2020    Subjective  Chart Reviewed: Yes, Orders, Progress Notes, History and Physical  Patient assessed for rehabilitation services?: Yes  Family / Caregiver Present: No    Subjective: cooperative, talkative    Pain:  Pain Assessment  Patient Currently in Pain: Yes(L side-ribs)  Pain Level: 5    Social/Functional History:  Lives With: Alone  Type of Home: House  Home Layout: One level  Home Access: Stairs to enter without rails  Entrance Stairs - Number of Steps: 2-3  Home Equipment: Reacher   Bathroom Shower/Tub: Walk-in shower  Bathroom Toilet: Standard(has 1 STS & 1 ETS)       ADL Assistance: Independent  Homemaking Assistance: Independent  Ambulation Assistance: Independent  Transfer Assistance: Independent    Active : Yes     Additional Comments: No AD at home, fully indep PLOF.     Cognition/Orientation: Overall Cognitive Status: Exceptions(Decreased STM, decreased problem solving, impulsive, slow processing)    ADL's:         EATING:Independent. Darletta Eduardo CARE Score: 6. ORAL HYGIENE:Supervision or touching assistance. Darletta Eduardo CARE Score: 4. TOILETING HYGIENE:Supervision or touching assistance. Darletta Eduardo CARE Score: 4. SHOWERING/BATHING:Supervision or touching assistance. Darletta Eduardo CARE Score: 4.     UPPER BODY DRESSING:Supervision or touching assistance. Darletta Eduardo CARE Score: 4. LOWER BODY DRESSING:Supervision or touching assistance. Darletta Eduardo CARE Score: 4. FOOTWEAR:Supervision or touching assistance   . CARE Score: 4. TOILET TRANSFER: Supervision or touching assistance. Darletta Eduardo CARE Score: 4. **Pt completed BADL in walk in shower. Sat for only 25% of shower, impulsive, vcs for safety & sequencing task (almost forgot to wash body). Extra time for completion of task. Required extra time & vcs for walker safety with retrieval of clothes (had to go back to dresser as she forgot some needed items). Seated for blow drying hair in recliner after s/u. Completed brushing teeth while standing at sink.      Functional Mobility:       Functional Mobility  Functional - Mobility Device: Rolling Walker  Activity: To/from bathroom, Other(+ to/from shower room)  Assist Level: Stand by assistance  Functional Mobility Comments: ed for walker safety     Balance:  Balance  Sitting Balance: Stand by assistance  Standing Balance: Stand by assistance    Transfers:  Sit to stand: Stand by assistance  Stand to sit: Stand by assistance  Toilet Transfers  Toilet - Technique: Ambulating  Equipment Used: Standard toilet  Toilet Transfer: Stand by assistance    Upper Extremity Assessment:   LUE AROM : WFL  RUE AROM : WFL    LUE Strength  Gross LUE Strength: (NT d/t pain in side)  RUE Strength  Gross RUE Strength: (NT d/t pain in side)    Sensation  Overall Sensation Status: WFL       Activity Tolerance: Patient limited by fatigue frequent RBs & c/o soreness all over    Assessment:  Assessment: Pt demo decreased ADL/IADL& functional mobility status over PLOF. Continued OT recommended to educate Pt on safety & adaptative strategies for increased safety & indep upon returning home  Performance deficits / Impairments: Decreased functional mobility , Decreased ADL status, Decreased endurance, Decreased balance, Decreased safe awareness, Decreased cognition, Decreased high-level IADLs  Prognosis: Fair, Good  Decision Making: Low Complexity  Type of devices: All fall risk precautions in place, Call light within reach, Gait belt    Treatment Initiated: Treatment and education initiated within context of evaluation. Evaluation time included review of current medical information, gathering information related to past medical, social and functional history, completion of standardized testing, formal and informal observation of tasks, assessment of data and development of plan of care and goals. Treatment time included skilled education and facilitation of tasks to increase safety and independence with ADL's for improved functional independence and quality of life.     Discharge Recommendations:  Continue to assess pending progress    Patient Education:  OT Education: OT Role, Plan of Care, ADL Adaptive Strategies, Transfer Training, IADL Safety  Barriers to Learning: decreased cognition    Equipment Recommendations:  Equipment Needed: Yes  Other: Monitor for shower chair & RW need    Plan:  Times per week: 90 minutes 5x/wk, 30 minutes 1x/wk  Current Treatment Recommendations: Functional Mobility Training, Endurance Training, Safety Education & Training, Self-Care / ADL, Balance Training, Cognitive Reorientation    Goals:  Patient goals : go home  Short term goals  Time Frame for Short term goals: 1 week  Short term goal 1: Pt will complete BADL routine with S & 0-2 vcs for safety & sequencing Short term goal 2: Pt will complete simple meal prep task with S & 0-2 vcs for safety  Short term goal 3: Pt will complete 8-10 min dynamic standing task with S for increased ease of returing to laundry tasks  Short term goal 4: Pt will complete IADL tasks with S & 0-2 vcs for walker safety  Long term goals  Time Frame for Long term goals : 2 weeks  Long term goal 1: Pt will complete BADL with mod I & 0-1 vcs for safety  Long term goal 2: Pt will complete simple meal prep tasks with mod I & 0-1 vcs for safety  See long-term goal time frame for expected duration of plan of care. If no long-term goals established, a short length of stay is anticipated. Following session, patient left in safe position with all fall risk precautions in place.

## 2020-12-27 PROCEDURE — 97530 THERAPEUTIC ACTIVITIES: CPT

## 2020-12-27 PROCEDURE — 6360000002 HC RX W HCPCS: Performed by: PHYSICIAN ASSISTANT

## 2020-12-27 PROCEDURE — 97110 THERAPEUTIC EXERCISES: CPT

## 2020-12-27 PROCEDURE — 97116 GAIT TRAINING THERAPY: CPT

## 2020-12-27 PROCEDURE — 6370000000 HC RX 637 (ALT 250 FOR IP): Performed by: PHYSICAL MEDICINE & REHABILITATION

## 2020-12-27 PROCEDURE — 6370000000 HC RX 637 (ALT 250 FOR IP): Performed by: PHYSICIAN ASSISTANT

## 2020-12-27 PROCEDURE — 1180000000 HC REHAB R&B

## 2020-12-27 PROCEDURE — 97535 SELF CARE MNGMENT TRAINING: CPT

## 2020-12-27 PROCEDURE — 6370000000 HC RX 637 (ALT 250 FOR IP): Performed by: FAMILY MEDICINE

## 2020-12-27 RX ADMIN — FUROSEMIDE 40 MG: 40 TABLET ORAL at 10:12

## 2020-12-27 RX ADMIN — METOPROLOL TARTRATE 25 MG: 25 TABLET ORAL at 10:12

## 2020-12-27 RX ADMIN — ASPIRIN 81 MG: 81 TABLET, COATED ORAL at 10:12

## 2020-12-27 RX ADMIN — DICYCLOMINE HYDROCHLORIDE 10 MG: 10 CAPSULE ORAL at 21:07

## 2020-12-27 RX ADMIN — ACETAMINOPHEN 500 MG: 500 TABLET ORAL at 22:09

## 2020-12-27 RX ADMIN — METOPROLOL TARTRATE 25 MG: 25 TABLET ORAL at 21:07

## 2020-12-27 RX ADMIN — DICYCLOMINE HYDROCHLORIDE 10 MG: 10 CAPSULE ORAL at 10:13

## 2020-12-27 RX ADMIN — CLOPIDOGREL BISULFATE 75 MG: 75 TABLET ORAL at 10:13

## 2020-12-27 RX ADMIN — POTASSIUM CHLORIDE 20 MEQ: 1500 TABLET, EXTENDED RELEASE ORAL at 10:14

## 2020-12-27 RX ADMIN — ENOXAPARIN SODIUM 40 MG: 40 INJECTION SUBCUTANEOUS at 10:15

## 2020-12-27 RX ADMIN — FAMOTIDINE 20 MG: 20 TABLET, FILM COATED ORAL at 10:15

## 2020-12-27 RX ADMIN — MULTIPLE VITAMINS W/ MINERALS TAB 1 TABLET: TAB at 10:12

## 2020-12-27 RX ADMIN — ATORVASTATIN CALCIUM 10 MG: 10 TABLET, FILM COATED ORAL at 21:07

## 2020-12-27 RX ADMIN — DICLOFENAC 4 G: 10 GEL TOPICAL at 14:33

## 2020-12-27 RX ADMIN — ACETAMINOPHEN 500 MG: 500 TABLET ORAL at 10:13

## 2020-12-27 RX ADMIN — DICLOFENAC 4 G: 10 GEL TOPICAL at 10:14

## 2020-12-27 ASSESSMENT — PAIN DESCRIPTION - PROGRESSION: CLINICAL_PROGRESSION: GRADUALLY IMPROVING

## 2020-12-27 ASSESSMENT — PAIN DESCRIPTION - DESCRIPTORS: DESCRIPTORS: ACHING

## 2020-12-27 ASSESSMENT — PAIN SCALES - GENERAL
PAINLEVEL_OUTOF10: 0
PAINLEVEL_OUTOF10: 5

## 2020-12-27 ASSESSMENT — PAIN DESCRIPTION - PAIN TYPE: TYPE: ACUTE PAIN

## 2020-12-27 ASSESSMENT — PAIN DESCRIPTION - ONSET: ONSET: ON-GOING

## 2020-12-27 ASSESSMENT — PAIN DESCRIPTION - FREQUENCY: FREQUENCY: CONTINUOUS

## 2020-12-27 ASSESSMENT — PAIN DESCRIPTION - LOCATION: LOCATION: RIB CAGE

## 2020-12-27 ASSESSMENT — PAIN DESCRIPTION - ORIENTATION: ORIENTATION: LEFT

## 2020-12-27 NOTE — PLAN OF CARE
Individualized Plan of Care  [facility] Inpatient Rehabilitation Unit    Rehabilitation physician: Dr. Radha Bowie Date: 12/24/2020     Rehabilitation Diagnosis: Hypoxic encephalopathy ([address] [G93.1]      Rehabilitation impairments: self care, mobility, motor dysfunction, pain management and cognitive function    Factors facilitating achievement of predicted outcomes: Family support, Motivated, Cooperative and Pleasant  Barriers to the achievement of predicted outcomes: Cognitive deficit and Decreased endurance    Patient Goals: Improve independence with mobility, Increase overall strength and endurance, Increase balance, Increase endurance, Increase independence with activities of daily living, Improve cognition, Integrate appropriate pain management plan, Assure adequate nutritional option for discharge, Continence of bowel and bladder and Provide appropriate patient and family education      NURSING:  Nursing goals for Gloria Maher while on the rehabilitation unit will include:  Continence of bowel and bladder, Adequate number of bowel movements, Effective pain management while on the rehabilitation unit, Establish adequate pain control plan for discharge, Absence of skin breakdown while on the rehabilitation unit, Improved skin integrity via assessments including wound measurements, Avoidance of any hospital acquired infections, Freedom from injury during hospitalization and Complete education with patient/family with understanding demonstrated regarding disease process and resultant impairment In order to achieve these goals, nursing interventions may include bowel/bladder training, education for medical assistive devices, medication education, O2 saturation management, energy conservation, stress management techniques, fall prevention, alarms protocol, seating and positioning, skin/wound care, pressure relief instruction, dressing changes, infection protection, DVT prophylaxis, assistance with safe transfers , and/or assistance with bathroom activities and hygiene. PHYSICAL THERAPY:  Goals:        Short term goals  Time Frame for Short term goals: 1 week  Short term goal 1: supine to sit and return with S to get in and out of bed  Short term goal 2: sit to stand with S to get on and off various surfaces  Short term goal 3: Pt will ambulate with least restrictive device and  feet to walk safely in the home and community distances  Long term goals  Time Frame for Long term goals : 3 weeks  Long term goal 1: supine to sit and return with I to get in and out of bed  Long term goal 2: sit to stand with I to get on and off various surfaces  Long term goal 3: Pt will ambulate with least restrictive device and I 350 feet to walk safely in the home and community distances  Long term goal 4: ascend/descend 2-3 steps with I, no UE support to enter home    Plan of Care: Patient to be seen by physical therapy services 90 minutes per day Monday through Friday and 30 minutes on Saturday or Sunday    Anticipated interventions may include therapeutic exercises, gait training, neuromuscular re-ed, transfer training, community reintegration, bed mobility, w/c mobility and training.       OCCUPATIONAL THERAPY:  Goals:             Short term goals  Time Frame for Short term goals: 1 week  Short term goal 1: Pt will complete BADL routine with S & 0-2 vcs for safety & sequencing  Short term goal 2: Pt will complete simple meal prep task with S & 0-2 vcs for safety Short term goal 3: Pt will complete 8-10 min dynamic standing task with S for increased ease of returing to laundry tasks  Short term goal 4: Pt will complete IADL tasks with S & 0-2 vcs for walker safety  Long term goals  Time Frame for Long term goals : 2 weeks  Long term goal 1: Pt will complete BADL with mod I & 0-1 vcs for safety  Long term goal 2: Pt will complete simple meal prep tasks with mod I & 0-1 vcs for safety    Plan of Care: Patient to be seen by occupational therapy services 90 minutes per day Monday through Friday and 30 minutes on Saturday or Sunday    Anticipated interventions may include ADL and IADL retraining, strengthening, safety education and training, patient/caregiver education and training, equipment evaluation/ training/procurement, neuromuscular reeducation, wheelchair mobility training. SPEECH THERAPY:   Short-term Goals  Timeframe for Short-term Goals: 1 week  Goal 1: Pt will complete functional immediate/delayed recall and working memory tasks (with focus on compensatory strategies) with 75% accuracy, mod cues to improve retention of pertinent information. Goal 2: Pt will complete higher level problem solving and verbal/visual reasoning tasks with 80% accuracy, min cues to assist with independence for ADL contribution. Goal 3: Pt will complete moderately complex thought organization and executive functioning tasks (time, money/math/finances, medications, home-related) with 80% accuracy, min cues to maximize IADL completion per PLOF. Goal 4: Pt will complete higher level comprehension (written/auditory) tasks with 80% accuracy, mod cues to assist with understanding of multi-faceted information relevant to daily living scenarios. Goal 5: Pt will complete sustained, selective, and divided attention tasks with no more than 3 re-directions/errors until task completion to improve mental focus and ability to return to driving and household obligations. Goal 6: Pt will complete high level naming tasks (divergent, convergent, verbal fluency) with the use of compensatary strategies as appropriate  with 80% accuracy, mod cues to improve word finding within conversational discourse. Plan of Care: Pt to be seen by speech therapy services 30 minutes per day Monday through Friday . Anticipated interventions may include speech/language/communication therapy, cognitive training, group therapy, education, and/or dysphagia therapy based on the above goals. CASE MANAGEMENT:  Goals:   Assist patient/family with discharge planning, patient/family counseling,  and coordination with insurance during the inpatient rehabilitation stay. Other members of the multidisciplinary rehabilitation team that will be involved in the patient's plan of care include recreational therapy, dietary, respiratory therapy, and neuropsychology. Medical issues being managed closely and that require 24 hour availability of a physician:  Pain management, DVT prophylaxis, Fall precautions, Fluid/Electrolyte balance and Nutritional status                                           Physician anticipated functional outcomes: Improved independence with functional measures   Estimated length of stay for this admission 10 days  Medical Prognosis: Good  Anticipated disposition: Home. The potential to achieve the above medical and rehabilitative goals is excellent. This plan of care has been developed with the assistance and input of the multidisciplinary rehabilitation team.  The plan was reviewed with the patient.   The patient has had the opportunity to provide input to the therapy team. I have reviewed this Individualized Plan of Care and agree with its contents. Above documentation has been expanded, modified, adjusted to reflect the findings of my evaluations and goals for the patient.     Physician:  Mitch Ramirez MD

## 2020-12-27 NOTE — PROGRESS NOTES
Patient states while sitting in her recliner chair at this time is feeling dizzy and it lasted for a few minutes and also when she was putting on her makeup on this morning \"I just felt off, and sometimes feels like a wave\", and the room is not spinning. Including, yesterday she stated to have felt a little dizzy after getting up to walk with therapy. Will continue to monitor. And reeducated to not get up without staff while in her room and in the bathroom.

## 2020-12-27 NOTE — PLAN OF CARE
Problem: Falls - Risk of:  Goal: Will remain free from falls  Description: Will remain free from falls  Outcome: Met This Shift  Note:   Patient absent of falls this shift. Call-light within reach. Non-skid footwear in place. Encouraged to wait on staff when getting up off the toilet to make sure she is not feeling dizzy. Problem: Pain:  Goal: Pain level will decrease  Description: Pain level will decrease  Outcome: Ongoing  Note: Patient continues to have left rib discomfort and voltaren gel applied as ordered and the patient states it is helping.      Problem: Bleeding:  Goal: Will show no signs and symptoms of excessive bleeding  Description: Will show no signs and symptoms of excessive bleeding  Outcome: Met This Shift  Note: Patient has not had any bleeding during this shift     Problem: Bleeding:  Goal: Will show no signs and symptoms of excessive bleeding  Description: Will show no signs and symptoms of excessive bleeding  Outcome: Met This Shift  Note: Patient has not had any bleeding during this shift     Problem: Infection - Surgical Site:  Goal: Will show no infection signs and symptoms  Description: Will show no infection signs and symptoms  Outcome: Met This Shift

## 2020-12-27 NOTE — PROGRESS NOTES
[address] [place] [address]  Occupational Therapy  Daily Note  Time:    Time In: 0840  Time Out: 0940  Timed Code Treatment Minutes: 60 Minutes  Minutes: 60          Date: 2020  Patient Name: Maria E Zhang,   Gender: female      Room: [facility]54/054-A  MRN: [id number]  : 1931  (80 y.o.)  Referring Practitioner: Dr. Shantel Roche  Diagnosis: hypoxic encephalopathy  Additional Pertinent Hx: Per MD note: 80 y.o. female admitted to the inpatient rehabilitation unit on 2020. She was admitted to [address] [place] [address] on 2020. Patient presented to hospital for outpatient heart cath with Dr Cheryl Wilson. Patient had proceeded up to bathroom after cath and complained of CP and dizziness. Patient became unresponsive and asystole, CPR for 2 minutes given and patient responded, but with O2 sat 74% with 100% NRB, patient was intubated and taking to ICU. Patient was taken for TAVR on 2020. Restrictions/Precautions:  Restrictions/Precautions: Fall Risk  Position Activity Restriction  Other position/activity restrictions: s/p TAVR on 2020      SUBJECTIVE: cooperative, min impulsive, fair organization and safe judgment throughout. PAIN: 0/10: denied pain     COGNITION: Decreased Recall, Decreased Problem Solving and Decreased Safety Awareness    ADL:   Grooming: Stand By Assistance. standing sinkside to brush teeth x 3 min ,    Bathing: Stand By Assistance. setup for sponge bath. fair organization and attention to task. PT min distracted,   Upper Extremity Dressing: with set-up.  min cues for sequencing to don bra prior to laura shirt. S for clothing retrieval .  Lower Extremity Dressing: with set-up.  min increased time for problem solving/ decision making to don underwear / pants prior to socks,   Toileting: Stand By Assistance. Toilet Transfer: Stand By Assistance. Carissa Crowell BALANCE:  Sitting Balance:  Modified Independent. Standing Balance: Stand By Assistance. BED MOBILITY:  Not Tested    TRANSFERS:  Sit to Stand:  Stand By Assistance. FUNCTIONAL MOBILITY:  Assistive Device: None  Assist Level:  Stand By Assistance. Distance: To and from bathroom and To and from therapy apartment  Max cues for pathfinding and fair follow through with recall of instruction. ADDITIONAL ACTIVITIES:  Pt retrieved clothing  In room with SBA for balance, increased time and min cues for attention to task and problem solving to locate items in room, recall their location . Fair recall of where pt placed toiletries. PT completed a kitchen safety hazards task with SBA for balance, standing x 8 min prior to a seated rest break. PT able to identify 7/10 hazards without cues with increased time allowed. Mod cues for determining safety hazard vs minor clutter. Min perseverative on prior hazards fixed and mod cues for recalling why an item is a hazard. Fair ability to state an alternative solution as why a situation is a hazard vs, clutter  And not how she likes to set items up in the home. ASSESSMENT:     Activity Tolerance:  Patient tolerance of  treatment: good.         Discharge Recommendations: Continue to assess pending progress      Equipment Recommendations: Equipment Needed: Yes  Other: Monitor for shower chair & RW need  Plan: Times per week: 90 minutes 5x/wk, 30 minutes 1x/wk  Current Treatment Recommendations: Functional Mobility Training, Endurance Training, Safety Education & Training, Self-Care / ADL, Balance Training, Cognitive Reorientation    Patient Education  Patient Education: ADL's and IADL's    Goals  Short term goals  Time Frame for Short term goals: 1 week  Short term goal 1: Pt will complete BADL routine with S & 0-2 vcs for safety & sequencing  Short term goal 2: Pt will complete simple meal prep task with S & 0-2 vcs for safety Short term goal 3: Pt will complete 8-10 min dynamic standing task with S for increased ease of returing to laundry tasks  Short term goal 4: Pt will complete IADL tasks with S & 0-2 vcs for walker safety  Long term goals  Time Frame for Long term goals : 2 weeks  Long term goal 1: Pt will complete BADL with mod I & 0-1 vcs for safety  Long term goal 2: Pt will complete simple meal prep tasks with mod I & 0-1 vcs for safety    Following session, patient left in safe position with all fall risk precautions in place.

## 2020-12-27 NOTE — PROGRESS NOTES
[facility]  [address] [facility]  Occupational Therapy  Daily Note  Time:    Time In: 1350  Time Out: 1430  Timed Code Treatment Minutes: 40 Minutes  Minutes: 40          Date: 2020  Patient Name: Radha Rendon,   Gender: female      Room: [facility]54/054-A  MRN: [id number]  : 1931  (80 y.o.)  Referring Practitioner: Dr. Antonette Driver  Diagnosis: hypoxic encephalopathy  Additional Pertinent Hx: Per MD note: 80 y.o. female admitted to the inpatient rehabilitation unit on 2020. She was admitted to [facility] on 2020. Patient presented to hospital for outpatient heart cath with Dr Sugar Simental. Patient had proceeded up to bathroom after cath and complained of CP and dizziness. Patient became unresponsive and asystole, CPR for 2 minutes given and patient responded, but with O2 sat 74% with 100% NRB, patient was intubated and taking to ICU. Patient was taken for TAVR on 2020. Restrictions/Precautions:  Restrictions/Precautions: Fall Risk  Position Activity Restriction  Other position/activity restrictions: s/p TAVR on 2020      SUBJECTIVE: pleasant, cooperative, reviewed asking for assistance and waiting on staff prior to ambulation by self in the room and BR. Pt vebalized an understanding. PAIN: 0/10: denied pain. COGNITION: Decreased Recall and Impaired Memory    ADL:   Grooming: Stand By Assistance. Toileting: Stand By Assistance. Toilet Transfer: Stand By Assistance. Bijan Oshea BALANCE:  Sitting Balance:  Supervision. Standing Balance: Stand By Assistance. BED MOBILITY:  Not Tested    TRANSFERS:  Sit to Stand:  Stand By Assistance. Stand to Sit: Stand By Assistance. FUNCTIONAL MOBILITY:  Assistive Device: None  Assist Level:  Stand By Assistance. Distance:  To and from therapy gym Following session, patient left in safe position with all fall risk precautions in place.

## 2020-12-27 NOTE — PROGRESS NOTES
[address] [place] [address]  INPATIENT PHYSICAL THERAPY  DAILY NOTE  [facility]- [address] - 7E-54/054-A    Time In: 7  Time Out: 8473  Timed Code Treatment Minutes: 30 Minutes  Minutes: 30          Date: 2020  Patient Name: Chad Arce,  Gender:  female        MRN: [id number]  : 1931  (80 y.o.)     Referring Practitioner: Oskar Williamson MD  Diagnosis: hypoxic encephalopathy  Additional Pertinent Hx: Per MD note: 80 y.o. female admitted to the inpatient rehabilitation unit on 2020. She was admitted to [address] [place] [address] on 2020. Patient presented to hospital for outpatient heart cath with Dr Catrachito Garcia. Patient had proceeded up to bathroom after cath and complained of CP and dizziness. Patient became unresponsive and asystole, CPR for 2 minutes given and patient responded, but with O2 sat 74% with 100% NRB, patient was intubated and taking to ICU. Patient was taken for TAVR on 2020. Pt to IPR: 2020. Prior Level of Function:  Lives With: Alone  Type of Home: House  Home Layout: One level  Home Access: (typically goes through the garage- no hand rail, can enter through the front door with newly installed hand rail on the left)  Entrance Stairs - Number of Steps: 2-3  Home Equipment: Reacher   Bathroom Shower/Tub: Walk-in shower  Bathroom Toilet: Standard(has 1 STS & 1 ETS)    ADL Assistance: [address] [place] [address] Avenue: Independent  Ambulation Assistance: Independent  Transfer Assistance: Independent  Active : Yes  Additional Comments: No AD at home, fully indep PLOF. Restrictions/Precautions:  Restrictions/Precautions: Fall Risk  Position Activity Restriction  Other position/activity restrictions: s/p TAVR on 2020     SUBJECTIVE: Pt. In restroom with call light on upon arrival. Pt. Agreeable to therapy with minimal encouragement. PAIN: Not rated. L ribcage with trunk rotation.      OBJECTIVE:  Bed Mobility:  Not Tested Long term goal 2: sit to stand with I to get on and off various surfaces  Long term goal 3: Pt will ambulate with least restrictive device and I 350 feet to walk safely in the home and community distances  Long term goal 4: ascend/descend 2-3 steps with I, no UE support to enter home    Following session, patient left in safe position with all fall risk precautions in place.

## 2020-12-27 NOTE — PROGRESS NOTES
[address] [place] [address]  INPATIENT PHYSICAL THERAPY  DAILY NOTE  [facility] - 7E-54/054-A    Time In: 0931  Time Out: 1205  Timed Code Treatment Minutes: 68 Minutes  Minutes: 68          Date: 2020  Patient Name: Chad Arce,  Gender:  female        MRN: [id number]  : 1931  (80 y.o.)     Referring Practitioner: Oskar Williamson MD  Diagnosis: hypoxic encephalopathy  Additional Pertinent Hx: Per MD note: 80 y.o. female admitted to the inpatient rehabilitation unit on 2020. She was admitted to [address] [place] [address] on 2020. Patient presented to hospital for outpatient heart cath with Dr Catrachito aGrcia. Patient had proceeded up to bathroom after cath and complained of CP and dizziness. Patient became unresponsive and asystole, CPR for 2 minutes given and patient responded, but with O2 sat 74% with 100% NRB, patient was intubated and taking to ICU. Patient was taken for TAVR on 2020. Pt to IPR: 2020. Prior Level of Function:  Lives With: Alone  Type of Home: House  Home Layout: One level  Home Access: (typically goes through the garage- no hand rail, can enter through the front door with newly installed hand rail on the left)  Entrance Stairs - Number of Steps: 2-3  Home Equipment: Reacher   Bathroom Shower/Tub: Walk-in shower  Bathroom Toilet: Standard(has 1 STS & 1 ETS)    ADL Assistance: [address] [place] [address] Avenue: Independent  Ambulation Assistance: Independent  Transfer Assistance: Independent  Active : Yes  Additional Comments: No AD at home, fully indep PLOF. Restrictions/Precautions:  Restrictions/Precautions: Fall Risk  Position Activity Restriction  Other position/activity restrictions: s/p TAVR on 2020     SUBJECTIVE: Patient seated in BS chair upon PTA arrival. Patient pleasant and agreeable to therapy treatment. PAIN: 0/10: patient denies pain, however states pain in left side throughout treatment, no pain rating given. OBJECTIVE:  Bed Mobility:  Supine to Sit: Stand By Assistance  Sit to Supine: Stand By Assistance     Transfers:  Sit to Stand: Stand By Assistance  Stand to Sit:Stand By Assistance    Ambulation:  Contact Guard Assistance  Distance: 200 feet x 1, 250 feet x 1, multiple short distances in the rehab gym. Surface: Level Tile  Device:No Device  Gait Deviations:  Slow Oly, Decreased Step Length Bilaterally, Decreased Arm Swing, Decreased Gait Speed, Decreased Heel Strike Bilaterally and Mild Path Deviations    Balance:  Not Tested    Exercise:  Patient was guided in 1 set(s) 10 reps of exercise to both lower extremities. Ankle pumps, Glut sets, Quad sets, Heelslides, Short arc quads, Hip abduction/adduction and Straight leg raises. Exercises were completed for increased independence with functional mobility. Attempt bridging, however patient states increased pain in left side, bridging discontinued at that time. Verbal cues required to slow down movements for max mm control. Functional Outcome Measures: Not completed       ASSESSMENT:  Assessment: Patient progressing toward established goals. Activity Tolerance:  Patient tolerance of  treatment: good.       Equipment Recommendations:   Discharge Recommendations:  Continue to assess pending progress, Patient would benefit from continued therapy after discharge    Plan: Times per week: 5x/wk 90min, 1x/wk 30  Current Treatment Recommendations: Strengthening, Balance Training, Endurance Training, Functional Mobility Training, Transfer Training, Gait Training, Stair training, Home Exercise Program    Patient Education  Patient Education: Plan of Care, Bed Mobility, Transfers, Gait,  - Patient Verbalized Understanding    Goals:  Patient goals : go home  Short term goals  Time Frame for Short term goals: 1 week

## 2020-12-28 PROCEDURE — 97530 THERAPEUTIC ACTIVITIES: CPT

## 2020-12-28 PROCEDURE — 6370000000 HC RX 637 (ALT 250 FOR IP): Performed by: PHYSICIAN ASSISTANT

## 2020-12-28 PROCEDURE — 94760 N-INVAS EAR/PLS OXIMETRY 1: CPT

## 2020-12-28 PROCEDURE — 99233 SBSQ HOSP IP/OBS HIGH 50: CPT | Performed by: PHYSICAL MEDICINE & REHABILITATION

## 2020-12-28 PROCEDURE — 6360000002 HC RX W HCPCS: Performed by: PHYSICIAN ASSISTANT

## 2020-12-28 PROCEDURE — 97535 SELF CARE MNGMENT TRAINING: CPT

## 2020-12-28 PROCEDURE — 97130 THER IVNTJ EA ADDL 15 MIN: CPT

## 2020-12-28 PROCEDURE — 6370000000 HC RX 637 (ALT 250 FOR IP): Performed by: FAMILY MEDICINE

## 2020-12-28 PROCEDURE — 97129 THER IVNTJ 1ST 15 MIN: CPT

## 2020-12-28 PROCEDURE — 97116 GAIT TRAINING THERAPY: CPT

## 2020-12-28 PROCEDURE — 1180000000 HC REHAB R&B

## 2020-12-28 PROCEDURE — 97110 THERAPEUTIC EXERCISES: CPT

## 2020-12-28 RX ADMIN — FAMOTIDINE 20 MG: 20 TABLET, FILM COATED ORAL at 08:59

## 2020-12-28 RX ADMIN — ASPIRIN 81 MG: 81 TABLET, COATED ORAL at 08:58

## 2020-12-28 RX ADMIN — FUROSEMIDE 40 MG: 40 TABLET ORAL at 08:59

## 2020-12-28 RX ADMIN — METOPROLOL TARTRATE 25 MG: 25 TABLET ORAL at 08:58

## 2020-12-28 RX ADMIN — METOPROLOL TARTRATE 25 MG: 25 TABLET ORAL at 20:15

## 2020-12-28 RX ADMIN — POTASSIUM CHLORIDE 20 MEQ: 1500 TABLET, EXTENDED RELEASE ORAL at 08:58

## 2020-12-28 RX ADMIN — CLOPIDOGREL BISULFATE 75 MG: 75 TABLET ORAL at 08:58

## 2020-12-28 RX ADMIN — ATORVASTATIN CALCIUM 10 MG: 10 TABLET, FILM COATED ORAL at 20:15

## 2020-12-28 RX ADMIN — DICYCLOMINE HYDROCHLORIDE 10 MG: 10 CAPSULE ORAL at 08:59

## 2020-12-28 RX ADMIN — DICYCLOMINE HYDROCHLORIDE 10 MG: 10 CAPSULE ORAL at 20:15

## 2020-12-28 RX ADMIN — MULTIPLE VITAMINS W/ MINERALS TAB 1 TABLET: TAB at 08:58

## 2020-12-28 RX ADMIN — ENOXAPARIN SODIUM 40 MG: 40 INJECTION SUBCUTANEOUS at 08:59

## 2020-12-28 NOTE — PLAN OF CARE
Complementary Health Approaches: Patient with no current interest in complementary health approaches at time of SW assessment. Anticipated Needs/Discharge Plans: Anticipate patient would benefit from continued therapy upon discharge home. SW met with patient to introduce self and explain role, complete SW assessment, and initiate discharge planning. Prior to hospitalization, patient was active and independent with ADL's and personal care. Patient lives alone. Patient was not using any medical equipment for ambulation prior to admission. Patient was driving, managing own finances, managing own medications, errands, housekeeping, laundry, and meal preparation. Patient indicates still being able to mow her own lawn. Patient has been  since . Patient had three children, two of which are . Patient's son, Esme Velez, lives in [place]. Patient has two step-daughters, Shirley Mccracken and Kristian Urbina, that live close and are able to provide intermittent support. Anticipate patient would benefit from continued therapy upon discharge home. SW reviewed with patient team conference on Monday, 2020, to further discuss progress and discharge recommendations. SW to follow and maintain involvement in discharge planning.            Discharge Planning  Living Arrangements: Family Members  Support Systems: Children, Family Members  Potential Assistance Needed: N/A  Potential Assistance Purchasing Medications: No  Meds-to-Beds: Does the patient want to have any new prescriptions delivered to bedside prior to discharge?: No  Expected Discharge Date: (Undetermined)  Follow Up Appointment: Best Day/Time :       Electronically signed by GERALDINE Velasco on 2020 at 11:11 AM

## 2020-12-28 NOTE — PROGRESS NOTES
Patient: Jose Labor  Unit/Bed: 8W-07/264-S  YOB: 1931  MRN: [id number] Acct: [de-identified]   Admitting Diagnosis: Hypoxic encephalopathy ([facility] [address] [G93.1]  Admit Date:  12/24/2020  Hospital Day: 4    Assessment:     Active Problems:    Osteoarthrosis involving multiple sites    Chronic congestive heart failure (HCC)    Systolic ejection murmur 5/6 best aortic area    Cardiomyopathy ([address]    Moderate malnutrition ([facility] [address]    Hypoxic encephalopathy ([facility] [address]  Resolved Problems:    * No resolved hospital problems. *      Plan:     Continue to follow        Subjective:     Patient has no complaint of CP, SOB, GI upset or voiding troubles. .   Medication side effects: none    Scheduled Meds:   diclofenac sodium  4 g Topical TID    aspirin  81 mg Oral Daily    clopidogrel  75 mg Oral Daily    enoxaparin  40 mg Subcutaneous Daily    atorvastatin  10 mg Oral Nightly    dicyclomine  10 mg Oral BID    furosemide  40 mg Oral Daily    metoprolol tartrate  25 mg Oral BID    potassium chloride  20 mEq Oral Daily    therapeutic multivitamin-minerals  1 tablet Oral Daily    famotidine  20 mg Oral Daily     Continuous Infusions:  PRN Meds:ondansetron, polyethylene glycol, acetaminophen, senna    Review of Systems  Pertinent items are noted in HPI. Objective:     Patient Vitals for the past 8 hrs:   SpO2   12/28/20 1327 98 %     I/O last 3 completed shifts: In: 480 [P.O.:480]  Out: -   No intake/output data recorded.     BP (!) 134/52   Pulse 73   Temp 97.5 °F (36.4 °C) (Oral)   Resp 20   Ht 5' 2\" (1.575 m)   Wt 123 lb (55.8 kg)   SpO2 98%   BMI 22.50 kg/m²     BP (!) 134/52   Pulse 73   Temp 97.5 °F (36.4 °C) (Oral)   Resp 20   Ht 5' 2\" (1.575 m)   Wt 123 lb (55.8 kg)   SpO2 98%   BMI 22.50 kg/m²   General appearance: alert, appears stated age and cooperative  Head: Normocephalic, without obvious abnormality, atraumatic  Lungs: clear to auscultation bilaterally Heart: regular rate and rhythm, S1, S2 normal, no murmur, click, rub or gallop  Abdomen: soft, non-tender; bowel sounds normal; no masses,  no organomegaly  Extremities: extremities normal, atraumatic, no cyanosis or edema  Skin: Skin color, texture, turgor normal. No rashes or lesions  Neurologic: Grossly normal      Electronically signed by Alayna Crow MD on 12/28/2020 at 5:23 PM

## 2020-12-28 NOTE — PROGRESS NOTES
[address] [address]  Recreational Therapy  Evaluation  Inpatient Rehabilitation Unit      Time Spent with Patient: 60 minutes    Date:  2020       Patient Name: Jose Cabrera      MRN: [id number]       YOB: 1931 (80 y.o.)       Gender: female  Diagnosis: hypoxic encephalopathy  Referring Practitioner: Dr. 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

## 2020-12-28 NOTE — PROGRESS NOTES
[address] NOTE    Conference Date: 2020  Admit Date:  2020 11:30 AM  Patient Name: Kehinde Phipps    MRN: [id number]    : 1931  (80 y.o.)  Rehabilitation Admitting Diagnosis:  Hypoxic encephalopathy ([facility] [address] [G93.1]  Referring Practitioner: Joe Jacobs MD      CASE MANAGEMENT  Current issues/needs regarding patient and family discharge status: SW met with patient to introduce self and explain role, complete SW assessment, and initiate discharge planning. Prior to hospitalization, patient was active and independent with ADL's and personal care. Patient lives alone. Patient was not using any medical equipment for ambulation prior to admission. Patient was driving, managing own finances, managing own medications, errands, housekeeping, laundry, and meal preparation. Patient indicates still being able to mow her own lawn. Patient has been  since . Patient had three children, two of which are . Patient's son, Nixon Daniels, lives in [place]. Patient has two step-daughters, Carmen Turner and Shelly Andino, that live close and are able to provide intermittent support. Anticipate patient would benefit from continued therapy upon discharge home. SW reviewed with patient team conference on Monday, 2020, to further discuss progress and discharge recommendations. SW to follow and maintain involvement in discharge planning. PHYSICAL THERAPY     Assessment: Patient progressing toward established goals, meeting all short term goals. See long term goals. Pt will benefit from continued skilled PT to further advance higher level balance and endurance while monitoring her response with a walking program.  Education continues to be needed for progressions with the home walking program for safety. No AD indicated.      SPEECH THERAPY Pt presents with mild-moderate cognitive-linguistic impairments characterized by deficits within recall, working memory, complex reasoning, thought organization, reading comprehension, writing, attention and problem solving, as outlined by total score of 76/94 on the [facility]. Pt does exhibit intermittent anomia with inconsistent use of word finding strategies for improved effective communication. Limited overall insight to cognitive-linguistic deficits. Pt was independent with all ADLs and IADLs PTA, including medication and financial management as well as actively driving. Continued ST interventions are warranted to address the above cognitive-linguistic impairments to permit potential safe return to baseline responsibilities. Recommended to increase time to BID 2x daily for 60 minutes total 5x week d/t anticipated return home at indep/mod I level    OCCUPATIONAL THERAPY  Pt is making gains however has not met any goals consistently from OT due to short LOS. Carlos Lin currently requires SBA for ADLs including clothign retreival with increase time needed for problem solving and sequencing. She requires min cues for organization as well as attention to task with selecting appropriate clothing. Discussed options for changing clothing and laundry with pt requiring 3 cues for recalling discussion of plan and sorting/ setting up the task. She displays fair recall of session and fair recall of events from a prior day, ie. sponge bath the previous day and when she showered? .  Continued OT recommended to educate Pt on safety & adaptative strategies for increased safety & indep upon returning home  Equipment Needed: Yes  Other: Monitor for shower chair & RW need    RECREATIONAL THERAPY Is patient appropriate for an outpatient driving evaluation? no  Equipment at Discharge: Possible shower chair  Factors facilitating achievement of predicted outcomes: Family support, Motivated and Cooperative  Barriers to the achievement of predicted outcomes: Impulsivity, Limited safety awareness, Limited insight into deficits and Unrealistic expectations    Team Members Present at Conference:  :Lavern Mccullough  Occupational Therapist:Evangelina [address] [facility] [address] OTR/L [address] [facility], [address] [place] [address]  Speech Therapist:  Radha Soriano RN  Psychologist: Gaurav Baez, PhD.    I approve the established interdisciplinary plan of care as documented within the medical record of [id number] [address].     Jessi Campos

## 2020-12-28 NOTE — PROGRESS NOTES
[address]  Occupational Therapy  Daily Note  Time:   Time In: 1400  Time Out: 1430  Timed Code Treatment Minutes: 30 Minutes  Minutes: 30          Date: 2020  Patient Name: Crispin Vines,   Gender: female      Room: 7E-54/054-A  MRN: [id number]  : 1931  (80 y.o.)  Referring Practitioner: Dr. Susu Brown  Diagnosis: hypoxic encephalopathy  Additional Pertinent Hx: Per MD note: 80 y.o. female admitted to the inpatient rehabilitation unit on 2020. She was admitted to [facility] on 2020. Patient presented to hospital for outpatient heart cath with Dr Talon Mora. Patient had proceeded up to bathroom after cath and complained of CP and dizziness. Patient became unresponsive and asystole, CPR for 2 minutes given and patient responded, but with O2 sat 74% with 100% NRB, patient was intubated and taking to ICU. Patient was taken for TAVR on 2020. Restrictions/Precautions:  Restrictions/Precautions: Fall Risk  Position Activity Restriction  Other position/activity restrictions: s/p TAVR on 2020. Monitor pulse rate with activity. Keep <= 20 BPM increase over baseline     SUBJECTIVE: Pt seated in chair upon arrival; agreeable to therapy this date. PAIN: patient stated no pain    COGNITION: Decreased Safety Awareness, Difficulty Following Commands and Impulsive    ADL:   Toileting: Stand By Assistance. requires min verbal cues for safety technique to decrease fall risk  Toilet Transfer: Stand By Assistance. with GB requiring min verbal cues for hand placement and body positioning for safe transfer to prevent falls. BALANCE:  Sitting Balance:  Supervision. seated in chair at table  Standing Balance: Stand By Assistance. at kitchen sink with min verbal cues for body positioning to decrease fall risk; demonstrating understanding.      BED MOBILITY:  Not Tested    TRANSFERS: Current Treatment Recommendations: Functional Mobility Training, Endurance Training, Safety Education & Training, Self-Care / ADL, Balance Training, Cognitive Reorientation    Patient Education  Patient Education: ADL's, IADL's, Home Safety and Home Safety Education    Goals  Short term goals  Time Frame for Short term goals: 1 week  Short term goal 1: Pt will complete BADL routine with S & 0-2 vcs for safety & sequencing  Short term goal 2: Pt will complete simple meal prep task with S & 0-2 vcs for safety  Short term goal 3: Pt will complete 8-10 min dynamic standing task with S for increased ease of returing to laundry tasks  Short term goal 4: Pt will complete IADL tasks with S & 0-2 vcs for walker safety  Long term goals  Time Frame for Long term goals : 2 weeks  Long term goal 1: Pt will complete BADL with mod I & 0-1 vcs for safety  Long term goal 2: Pt will complete simple meal prep tasks with mod I & 0-1 vcs for safety    Following session, patient left in safe position with all fall risk precautions in place.

## 2020-12-28 NOTE — PROGRESS NOTES
[address] [place] [address]  INPATIENT PHYSICAL THERAPY  DAILY NOTE  [facility] - 7E-54/054-A    Time In: 0730  Time Out: 0830  Timed Code Treatment Minutes: 60 Minutes  Minutes: 60          Date: 2020  Patient Name: Pricila Wright,  Gender:  female        MRN: [id number]  : 1931  (80 y.o.)     Referring Practitioner: Toni Boswell MD  Diagnosis: hypoxic encephalopathy  Additional Pertinent Hx: Per MD note: 80 y.o. female admitted to the inpatient rehabilitation unit on 2020. She was admitted to [address] [place] [address] on 2020. Patient presented to hospital for outpatient heart cath with Dr Antonio Ibrahim. Patient had proceeded up to bathroom after cath and complained of CP and dizziness. Patient became unresponsive and asystole, CPR for 2 minutes given and patient responded, but with O2 sat 74% with 100% NRB, patient was intubated and taking to ICU. Patient was taken for TAVR on 2020. Pt to IPR: 2020. Prior Level of Function:  Lives With: Alone  Type of Home: House  Home Layout: One level  Home Access: (typically goes through the garage- no hand rail, can enter through the front door with newly installed hand rail on the left)  Entrance Stairs - Number of Steps: 2-3  Home Equipment: Reacher   Bathroom Shower/Tub: Walk-in shower  Bathroom Toilet: Standard(has 1 STS & 1 ETS)    ADL Assistance: [address] [place] [address] Avenue: Independent  Ambulation Assistance: Independent  Transfer Assistance: Independent  Active : Yes  Additional Comments: No AD at home, fully indep PLOF. Restrictions/Precautions:  Restrictions/Precautions: Fall Risk  Position Activity Restriction  Other position/activity restrictions: s/p TAVR on 2020. Monitor pulse rate with activity. Keep <= 20 BPM increase over baseline     SUBJECTIVE: Pt seated edge of bed. Pleasant and cooperative. Requested to use restroom.   Continent of bowel and bladder PAIN: 0/10: at rest.  Lt rib pain with certain movements. OBJECTIVE:  Bed Mobility:  Supine to Sit: Modified Independent. Rail was engaged and head of bed elevated. Transfers:  Sit to Stand: Modified Independent  Stand to Sit:Modified Independent  Car transfer:  SBA with cues for technique. Ambulation:  Stand By Assistance  Distance: 125 ft x2  Surface: Level Tile  Device:No Device  Gait Deviations:  Slow Oly, Decreased Gait Speed. Monitoring pulse rate showed a 20 BPM increase at that distance with ea trial.    Pt educated on using ex for warm up and then walking while monitoring RPE, using walk talk test or monitoring heart rate to determine walking distance or time. Pt will need further education on this. Balance:  Single Leg Balance: 5 sec LLE  Stairs:  Stand By Assistance  Number of Steps: 1  Height: 6\" step with No Device   Stairs:  Stand By Assistance  Number of Steps: 4 (x2 trials)  Height: 6\" step with One Handrail   Cues and demo given to pause after ea step x5 counts. EXERCISES:  The following exercises were completed to improve functional mobility: seated step 2 cardiac ex performed for cool down after 2nd walk. x10 reps ea. Demo for technique   Functional Outcome Measures: Completed  Torres Balance Score: 52  TORRES BALANCE TEST SCORING   Score of < 45 indicates a greater risk of falling  41-56= low fall risk  21-40= medium fall risk (recommendation of walking with assist at all times)  0-20= high fall risk       ASSESSMENT:  Assessment: Patient progressing toward established goals, meeting all short term goals. See long term goals. Pt will benefit from continued skilled PT to further advance higher level balance and endurance while monitoring her response with a walking program.  Education continues to be needed for progressions with the home walking program for safety. Activity Tolerance:  Patient tolerance of  treatment: good.       Equipment Recommendations: Discharge Recommendations:  Continue to assess pending progress, Patient would benefit from continued therapy after discharge    Plan: Times per week: 5x/wk 90min, 1x/wk 30  Current Treatment Recommendations: Strengthening, Balance Training, Endurance Training, Functional Mobility Training, Transfer Training, Gait Training, Stair training, Home Exercise Program    Patient Education  Patient Education: Home Exercise Program, Transfers, Gait, Stairs, Car Transfers, Education Related to Prevention of Recurrence of Impairment/Illness/Injury,  - Patient Verbalized Understanding, - Patient Requires Continued Education    Goals:  Patient goals : go home  Short term goals  Time Frame for Short term goals: 1 week  Short term goal 1: supine to sit and return with S to get in and out of bed MET, see LTG  Short term goal 2: sit to stand with S to get on and off various surfaces MET, see lTG  Short term goal 3: Pt will ambulate with least restrictive device and  feet to walk safely in the home and community distances MET, see LTG  Short term goal 4: ascend/descend 2-3 steps with SBA to enter home MET, see LTG  Long term goals  Time Frame for Long term goals : 3 weeks  Long term goal 1: supine to sit and return with I to get in and out of bed  Long term goal 2: sit to stand with I to get on and off various surfaces  Long term goal 3: Pt will ambulate without devices, showing independence with walking program, using various techniques to determine walking distance or time, for home walking program.  Long term goal 4: Pt to ascend/descend 4 steps with Rt rail, pausing after ea step, Mod I for home entry  Long term goal 5: Pt to be independent with home walking program to include warm up and cool down ex for improved endurance. Long term goal 6: Pt to get in/out of car, Mod I, for transportation needs. Following session, patient left in safe position with all fall risk precautions in place.

## 2020-12-28 NOTE — PROGRESS NOTES
[address] THERAPY  [facility]  DAILY NOTE   INSURANCE UPDATE    TIME   SLP Individual Minutes  Time In: 0900  Time Out: 0035  Minutes: 32  Timed Code Treatment Minutes: 32 Minutes      Cognitive Tx: 32 minutes     Date: 2020  Patient Name: Tenisha Mcclelland      CSN: [id number]   : 1931  (80 y.o.)  Gender: female   Referring Physician:  Fermin Lozano MD  Diagnosis: hypoxic encephalopathy   Secondary Diagnosis: cognitive deficits   Precautions: fall risk  Current Diet: Regular solids with thin liquids   Swallowing Strategies: Standard Universal Swallow Precautions  Date of Last MBS: Not Applicable    Pain:  No pain reported. Subjective:  Patient is alert, upright in recliner chair, and agreeable for ST treatment session. The pt is pleasant, slightly tangential, and oriented. Recommended to increase time to BID 2x daily for 60 minutes total 5x week. Pt in agreement. Short-Term Goals:  SHORT TERM GOAL #1:  Goal 1: Pt will complete functional immediate/delayed recall and working memory tasks (with focus on compensatory strategies) with 75% accuracy, mod cues to improve retention of pertinent information. INTERVENTIONS:  Prior to completing introduction of memory strategies, the pt was challenged to verbalize any strategies she may use on her own. -Write it: indep  -Association: indep    Completed review of STM strategies using the acronym WRAP--Write it down, Repeat it, Associate it, and Pictures it. (Immediate Recall): / indep   (Delayed Recall): 5 minute delay- 2/4 indep, 2/4 max A      SHORT TERM GOAL #2:  Goal 2: Pt will complete higher level problem solving and verbal/visual reasoning tasks with 80% accuracy, min cues to assist with independence for ADL contribution.   INTERVENTIONS:  Check Writing Task:  Ck #1:  indep   Ck #2:  indep, /6 min A *min A to recall directions to use identified date, NOT the current date. *Great success   *decreased divided attention       SHORT TERM GOAL #3:  Goal 3: Pt will complete moderately complex thought organization and executive functioning tasks (time, money/math/finances, medications, home-related) with 80% accuracy, min cues to maximize IADL completion per PLOF. INTERVENTIONS:  Organization -- target of 13 :  (Kitchen items): 12 WPM, 2 repetitions, min A  (Bathroom items): 16, 0 repeats, indep   (Sharp items): 4 WPM, 0 repeat, mod A  *decreased mental flexibility  *decreased word retrieval  *decreased success with abstract reasoning. Pt continued to stated; \"Well I don't have any of that, so how would I know. \" re: sharp items. SHORT TERM GOAL #4:  Goal 4: Pt will complete higher level comprehension (written/auditory) tasks with 80% accuracy, mod cues to assist with understanding of multi-faceted information relevant to daily living scenarios. INTERVENTIONS: Did not address on this date d/t focus on other goals     SHORT TERM GOAL #5:  Goal 5: Pt will complete sustained, selective, and divided attention tasks with no more than 3 re-directions/errors until task completion to improve mental focus and ability to return to driving and household obligations. INTERVENTIONS: Did not address on this date d/t focus on other goals     Long-Term Goals:  Timeframe for Long-term Goals: 4 weeks    LONG TERM GOAL #1:  Goal 1: Pt will improve cognitive-linguistic skilled to a mod-I level of functioning or better to safely permit return to home and previous responisibilities at discharge.       Comprehension: 5 - Patient understands basic needs (hungry/hot/pain)  Expression: 5 - Expresses basic ideas/needs only (hungry/hot/pain)  Social Interaction: 5 - Patient is appropriate with supervision/cues  Problem Solvin - Patient solves simple/routine tasks 75-90%+   Memory: 3 - Patient remembers 50%-74% of the time INSURANCE UPDATE:  Patient was recently evaluated upon arrival to [facility]. Minimal progress documented at this time d/t limited treatment since admission. Pt continues to present with mild-moderate cognitive-linguistic impairments characterized by deficits within immediate/delayed recall, working memory, complex reasoning, thought organization, reading comprehension, writing, attention and problem solving, as outlined by total score of 76/94 on the [facility]. Pt does exhibit intermittent anomia with inconsistent use of word finding strategies for improved effective communication. Limited overall insight to cognitive-linguistic deficits. Pt was independent with all ADLs and IADLs PTA, including medication and financial management as well as actively driving. Continued ST interventions are HIGHLY warranted to address the above cognitive-linguistic impairments to permit potential safe return to baseline responsibilities. Pt highly motivated, eager to work, therefore therapy time recommended to increase to 60 minutes 5x week (BID), to optimize progress of aforementioned goals. EDUCATION:  Learner: Patient  Education:  Reviewed results and recommendations of this evaluation, Reviewed ST goals and Plan of Care and Reviewed recommendations for follow-up  Evaluation of Education: Verbalizes understanding    ASSESSMENT/PLAN:  Activity Tolerance:  Patient tolerance of  treatment: good. Assessment/Plan: Patient progressing toward established goals. Continues to require skilled care of licensed speech pathologist to progress toward achievement of established goals and plan of care. .     Plan for Next Session: Medication management, finances management, attention     Yesica Robert MA., CCC-SLP

## 2020-12-28 NOTE — PROGRESS NOTES
[facility]  INPATIENT PHYSICAL THERAPY  DAILY NOTE  [facility] - 7E-54/054-A    Time In: 1200  Time Out: 1230  Timed Code Treatment Minutes: 30 Minutes  Minutes: 30          Date: 2020  Patient Name: Jorge Hawley,  Gender:  female        MRN: [id number]  : 1931  (80 y.o.)     Referring Practitioner: Andre Stanton MD  Diagnosis: hypoxic encephalopathy  Additional Pertinent Hx: Per MD note: 80 y.o. female admitted to the inpatient rehabilitation unit on 2020. She was admitted to [facility] on 2020. Patient presented to hospital for outpatient heart cath with Dr Alvarado Kaur. Patient had proceeded up to bathroom after cath and complained of CP and dizziness. Patient became unresponsive and asystole, CPR for 2 minutes given and patient responded, but with O2 sat 74% with 100% NRB, patient was intubated and taking to ICU. Patient was taken for TAVR on 2020. Pt to IPR: 2020. Prior Level of Function:  Lives With: Alone  Type of Home: House  Home Layout: One level  Home Access: (typically goes through the garage- no hand rail, can enter through the front door with newly installed hand rail on the left)  Entrance Stairs - Number of Steps: 2-3  Home Equipment: Reacher   Bathroom Shower/Tub: Walk-in shower  Bathroom Toilet: Standard(has 1 STS & 1 ETS)    ADL Assistance: [address] [place] [address] Avenue: Independent  Ambulation Assistance: Independent  Transfer Assistance: Independent  Active : Yes  Additional Comments: No AD at home, fully indep PLOF. Restrictions/Precautions:  Restrictions/Precautions: Fall Risk  Position Activity Restriction  Other position/activity restrictions: s/p TAVR on 2020. Monitor pulse rate with activity. Keep <= 20 BPM increase over baseline     SUBJECTIVE: Pt seated in recliner. Pleasant and cooperative.      PAIN: 0/10:     OBJECTIVE:    Transfers:  Sit to Stand: Supervision Short term goal 3: Pt will ambulate with least restrictive device and  feet to walk safely in the home and community distances MET, see LTG  Short term goal 4: ascend/descend 2-3 steps with SBA to enter home MET, see LTG  Long term goals  Time Frame for Long term goals : 3 weeks  Long term goal 1: supine to sit and return with I to get in and out of bed  Long term goal 2: sit to stand with I to get on and off various surfaces  Long term goal 3: Pt will ambulate without devices, showing independence with walking program, using various techniques to determine walking distance or time, for home walking program.  Long term goal 4: Pt to ascend/descend 4 steps with Rt rail, pausing after ea step, Mod I for home entry  Long term goal 5: Pt to be independent with home walking program to include warm up and cool down ex for improved endurance. Long term goal 6: Pt to get in/out of car, Mod I, for transportation needs. Following session, patient left in safe position with all fall risk precautions in place.

## 2020-12-28 NOTE — PROGRESS NOTES
[facility]  Inpatient Rehabilitation  Occupational Therapy  Daily Note  Time:  Time In: 9556  Time Out: 1100  Timed Code Treatment Minutes: 39 Minutes  Minutes: 45          Date: 2020  Patient Name: Mayra Coon,   Gender: female      Room: -54/054-A  MRN: [id number]  : 1931  (80 y.o.)  Referring Practitioner: Dr. Darnell Lima  Diagnosis: hypoxic encephalopathy  Additional Pertinent Hx: Per MD note: 80 y.o. female admitted to the inpatient rehabilitation unit on 2020. She was admitted to [facility] on 2020. Patient presented to hospital for outpatient heart cath with Dr Ellen Estevez. Patient had proceeded up to bathroom after cath and complained of CP and dizziness. Patient became unresponsive and asystole, CPR for 2 minutes given and patient responded, but with O2 sat 74% with 100% NRB, patient was intubated and taking to ICU. Patient was taken for TAVR on 2020. Restrictions/Precautions:  Restrictions/Precautions: Fall Risk  Position Activity Restriction  Other position/activity restrictions: s/p TAVR on 2020. Monitor pulse rate with activity. Keep <= 20 BPM increase over baseline    SUBJECTIVE: cooperative, min tangiential. Pt unable to recall if/ when she had a sponge bath (yesterday) or when she showered or that she even showered on the rehab unit. (Pt showered on Dec 26th). 3 cues for organizing items and sorting through items for placing  Dirty items in a bag for laundering and which items were clean to don after a bath. Fair decision making and attention to task. Fair sequencing noted. PAIN: 3/10: In left side of ribs. COGNITION: Decreased Recall, Decreased Insight, Impaired Memory, Decreased Problem Solving, Decreased Safety Awareness and Impaired Attention    ADL:   Grooming: Supervision.   standing sinkside to brush teeth wit h2 cues for problem solving what to use to dry toothbrush per pt request. Bathing: Stand By Assistance. setup for sponge bath with fair sequencing and organization. min cues for decision making. \" I don't do this like I do and home. I don't ever recall doing a sponge bath like this. \" ( completed sponge bath with this writer on 12-27-20). Upper Extremity Dressing: with set-up. Lower Extremity Dressing: Stand By Assistance. for balance to bend to floor to  sock. Toileting: Stand By Assistance. 2 occurrances during session. Pt unaware that she had already gone during this session , reporting that she almost always stops to use the BR when at home when she passes it. Toilet Transfer: Stand By Assistance. STS. Kathleen Acuña BALANCE:  Sitting Balance:  Modified Independent. Standing Balance: Stand By Assistance. BED MOBILITY:  Not Tested    TRANSFERS:  Sit to Stand:  IStand by assist.   Stand to Sit: Stand By Assistance. FUNCTIONAL MOBILITY:  Assistive Device: None  Assist Level:  Stand By Assistance. Distance: To and from bathroom and within room for clothing retrieval and gathering items for dressin task. min unsteadiness with transitions with distractions. ADDITIONAL ACTIVITIES:       ASSESSMENT:  Activity Tolerance:  Patient tolerance of  treatment: good. For physical mobility. Fair for cognition and problem solving. Assessment: Assessment: Pt is making gains however has not met any goals consistently from OT due to short LOS. Mery Stone currently requires SBA for ADLs including clothign retreival with increase time needed for problem solving and sequencing. She requires min cues for organization as well as attention to task with selecting appropriate clothing. Discussed options for changing clothing and laundry with pt requiring 3 cues for recalling discussion of plan and sorting/ setting up the task. She displays fair recall of session and fair recall of events from a prior day, ie. sponge bath the previous day and when she showered? .  Continued OT recommended to educate Pt on safety & adaptative strategies for increased safety & indep upon returning home  Discharge Recommendations: Continue to assess pending progress  Equipment Recommendations: Equipment Needed: Yes  Other: Monitor for shower chair & RW need  Plan: Times per week: 90 minutes 5x/wk, 30 minutes 1x/wk  Current Treatment Recommendations: Functional Mobility Training, Endurance Training, Safety Education & Training, Self-Care / ADL, Balance Training, Cognitive Reorientation    Patient Education  Patient Education: ADL's    Goals  Short term goals  Time Frame for Short term goals: 1 week  Short term goal 1: Pt will complete BADL routine with S & 0-2 vcs for safety & sequencing  Short term goal 2: Pt will complete simple meal prep task with S & 0-2 vcs for safety  Short term goal 3: Pt will complete 8-10 min dynamic standing task with S for increased ease of returing to laundry tasks  Short term goal 4: Pt will complete IADL tasks with S & 0-2 vcs for walker safety  Long term goals  Time Frame for Long term goals : 2 weeks  Long term goal 1: Pt will complete BADL with mod I & 0-1 vcs for safety  Long term goal 2: Pt will complete simple meal prep tasks with mod I & 0-1 vcs for safety Following session, patient left in safe position with all fall risk precautions in place.

## 2020-12-28 NOTE — PROGRESS NOTES
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

## 2020-12-28 NOTE — PLAN OF CARE
Problem: Falls - Risk of:  Goal: Will remain free from falls  Description: Will remain free from falls  Outcome: Ongoing     Patient will remain free from falls during this admission as evidenced by no falls during this admission patient and staff will utilize safety devices and techniques for transfer     Problem: Pain:  Description: Pain management should include both nonpharmacologic and pharmacologic interventions. Goal: Pain level will decrease  Description: Pain level will decrease  Outcome: Ongoing     Patient will participate in pain management to decrease pain as evidenced by patient use of breathing and meditation for pain management    Problem: Bleeding:  Goal: Will show no signs and symptoms of excessive bleeding  Description: Will show no signs and symptoms of excessive bleeding  Outcome: Ongoing    Patient will participate in bleeding precautions during this admission as evidenced by patient making good diet choices related to bleeding and patient will alert staff if he has concerns related to obvious bleeding    Problem: Discharge Planning:  Goal: Patients continuum of care needs are met  Description: Patients continuum of care needs are met  Outcome: Ongoing    Patient will participate in discharge planning during this admission as evidenced by patient will continue to work with nursing and therapy for home going education.

## 2020-12-29 PROCEDURE — 6370000000 HC RX 637 (ALT 250 FOR IP): Performed by: PHYSICAL MEDICINE & REHABILITATION

## 2020-12-29 PROCEDURE — 97116 GAIT TRAINING THERAPY: CPT

## 2020-12-29 PROCEDURE — 97110 THERAPEUTIC EXERCISES: CPT

## 2020-12-29 PROCEDURE — 97129 THER IVNTJ 1ST 15 MIN: CPT

## 2020-12-29 PROCEDURE — 6360000002 HC RX W HCPCS: Performed by: PHYSICIAN ASSISTANT

## 2020-12-29 PROCEDURE — 97530 THERAPEUTIC ACTIVITIES: CPT

## 2020-12-29 PROCEDURE — 97130 THER IVNTJ EA ADDL 15 MIN: CPT

## 2020-12-29 PROCEDURE — 1180000000 HC REHAB R&B

## 2020-12-29 PROCEDURE — 6370000000 HC RX 637 (ALT 250 FOR IP): Performed by: PHYSICIAN ASSISTANT

## 2020-12-29 PROCEDURE — 6370000000 HC RX 637 (ALT 250 FOR IP): Performed by: FAMILY MEDICINE

## 2020-12-29 RX ADMIN — POTASSIUM CHLORIDE 20 MEQ: 1500 TABLET, EXTENDED RELEASE ORAL at 08:25

## 2020-12-29 RX ADMIN — ACETAMINOPHEN 500 MG: 500 TABLET ORAL at 02:42

## 2020-12-29 RX ADMIN — MULTIPLE VITAMINS W/ MINERALS TAB 1 TABLET: TAB at 08:26

## 2020-12-29 RX ADMIN — DICLOFENAC 4 G: 10 GEL TOPICAL at 19:47

## 2020-12-29 RX ADMIN — METOPROLOL TARTRATE 25 MG: 25 TABLET ORAL at 19:46

## 2020-12-29 RX ADMIN — ATORVASTATIN CALCIUM 10 MG: 10 TABLET, FILM COATED ORAL at 19:46

## 2020-12-29 RX ADMIN — DICLOFENAC 4 G: 10 GEL TOPICAL at 08:28

## 2020-12-29 RX ADMIN — DICLOFENAC 4 G: 10 GEL TOPICAL at 13:11

## 2020-12-29 RX ADMIN — FUROSEMIDE 40 MG: 40 TABLET ORAL at 08:26

## 2020-12-29 RX ADMIN — CLOPIDOGREL BISULFATE 75 MG: 75 TABLET ORAL at 08:25

## 2020-12-29 RX ADMIN — ASPIRIN 81 MG: 81 TABLET, COATED ORAL at 08:25

## 2020-12-29 RX ADMIN — FAMOTIDINE 20 MG: 20 TABLET, FILM COATED ORAL at 08:25

## 2020-12-29 RX ADMIN — DICYCLOMINE HYDROCHLORIDE 10 MG: 10 CAPSULE ORAL at 08:26

## 2020-12-29 RX ADMIN — ACETAMINOPHEN 500 MG: 500 TABLET ORAL at 23:11

## 2020-12-29 RX ADMIN — ENOXAPARIN SODIUM 40 MG: 40 INJECTION SUBCUTANEOUS at 08:25

## 2020-12-29 RX ADMIN — DICYCLOMINE HYDROCHLORIDE 10 MG: 10 CAPSULE ORAL at 19:46

## 2020-12-29 RX ADMIN — METOPROLOL TARTRATE 25 MG: 25 TABLET ORAL at 08:26

## 2020-12-29 ASSESSMENT — PAIN SCALES - GENERAL
PAINLEVEL_OUTOF10: 0
PAINLEVEL_OUTOF10: 2

## 2020-12-29 NOTE — PROGRESS NOTES
[facility]  [facility]  Occupational Therapy  Daily Note  Time:   Time In: 1130  Time Out: 1230  Minutes: 60          Date: 2020  Patient Name: Mari Corral,   Gender: female      Room: 7E-54/054-A  MRN: [id number]  : 1931  (80 y.o.)  Referring Practitioner: Dr. Feliberto Bender  Diagnosis: hypoxic encephalopathy  Additional Pertinent Hx: Per MD note: 80 y.o. female admitted to the inpatient rehabilitation unit on 2020. She was admitted to [facility] on 2020. Patient presented to hospital for outpatient heart cath with Dr Anil Montoya. Patient had proceeded up to bathroom after cath and complained of CP and dizziness. Patient became unresponsive and asystole, CPR for 2 minutes given and patient responded, but with O2 sat 74% with 100% NRB, patient was intubated and taking to ICU. Patient was taken for TAVR on 2020. Restrictions/Precautions:  Restrictions/Precautions: Fall Risk  Position Activity Restriction  Other position/activity restrictions: s/p TAVR on 2020. Monitor pulse rate with activity. Keep <= 20 BPM increase over baseline     SUBJECTIVE: Pt seated in chair upon arrival; agreeable to therapy this date. Pt pleasant and cooperative throughout session. Pt stated throughout session \"This is not how I would do it at home\". PAIN: no pain noted throughout session    COGNITION: Decreased Recall, Decreased Insight, Impaired Memory, Decreased Problem Solving, Decreased Safety Awareness and Difficulty Following Commands    ADL:   No ADL's completed this session. Vonzella Goodpasture BALANCE:  Sitting Balance:  Modified Independent. standard chair  Standing Balance: Supervision. min verbal cues for safety to prevent falls; demonstrating/verbalizing understanding. BED MOBILITY:  Not Tested    TRANSFERS:  Sit to Stand:  Modified Independent. from standard chair  Stand to Sit: Supervision.  for safety Pt impulsive with transfers requiring min verbal cues with safety as patient attempted to stand x2 prior to gait belt applied. FUNCTIONAL MOBILITY:  Assistive Device: None  Assist Level:  Supervision. Distance: To and from therapy apartment  Required min verbal cues for obstacles throughout walkway for safety to prevent falls; verbalizing understanding       ADDITIONAL ACTIVITIES:  Pt completed simple meal prep task with min verbal cues throughout task for continuation to task in order to retrieve appropriate items with SUP for safety. Pt demonstrates decreased carry over with location of objects used used multiple times throughout task. Pt demonstrates good problem solving technique to modify simple meal in order to complete task when patient discovered missing item to complete meal.  Pt demonstrates decreased safety awareness when burner started to smoke, patient required max verbal cues with problem solving and safety however Pt able to identify safety techniques with emergencies in own home independently. Pt completed laundry simulation task with ability to recall correct sequencing to tasks with SUP for safety. Pt given 3 items to locate throughout kitchen area for sequencing with ability to recall/locate 3/3 items however when given 4 items, pt demonstrated decreased recollection of items with attempting to gather items from first task. ASSESSMENT:     Activity Tolerance:  Patient tolerance of  treatment: good. Discharge Recommendations: Continue to assess pending progress, 24 hour supervision or assist, Home with nursing aide, Home with Home health OT(no driving. Driving evaluation when medically able.)   Equipment Recommendations: Equipment Needed: Yes  Other: Recommend a shower chair.   Plan: Times per week: 90 minutes 5x/wk, 30 minutes 1x/wk

## 2020-12-29 NOTE — DISCHARGE INSTR - COC
Continuity of Care Form    Patient Name: Mihaela Benson   :  1931  MRN:  [id number]    Admit date:  2020  Discharge date: 20    Code Status Order: Full Code   Advance Directives:   [facility] Documentation     Date/Time Healthcare Directive Type of Healthcare Directive Copy in [address] Agent's Name Healthcare Agent's Phone Number    [phone]  Yes, patient has an advance directive for healthcare treatment  Durable power of  for health care  No, copy requested from family  [facility] Physician:  Emelia Dotson MD  PCP: James Heredia MD    Discharging Nurse: Sonia Templeton RN  [address] Unit/Room#: 3H-57/103-A  Discharging Unit Phone Number: [phone]    Emergency Contact:   Extended Emergency Contact Information  Primary Emergency Contact: Mukul [address] Phone: [phone]  Mobile Phone: [phone]  Relation: Child  Preferred language: English   needed? No  [facility] Phone: [phone]  Relation: Step Child  Preferred language: English   needed? No    Past Surgical History:  Past Surgical History:   Procedure Laterality Date    CARPAL TUNNEL RELEASE Right     ELBOW SURGERY      2013 right elbow    TONSILLECTOMY         Immunization History: There is no immunization history on file for this patient. Active Problems:  Patient Active Problem List   Diagnosis Code    HTN (hypertension) I10    Hyperlipidemia E78.5    Osteoporosis M81.0    Allergic rhinitis J30.9    IBS (irritable bowel syndrome) K58.9    Osteoarthrosis involving multiple sites M15.9    Medication monitoring encounter Z51.81    Postmenopausal bone loss M81.0    Anemia D64.9    Right hand weakness, atrophy of thumb muscle.  R29.898    Chest pressure  on exertion R07.9    SOB (shortness of breath) on exertion R06.02  Chronic congestive heart failure (HCC) F39.6    Systolic ejection murmur 5/6 best aortic area Y60.9    Pansystolic murmur- best tricuspid area 5/6 R01.1    Severe aortic stenosis I35.0    Cardiomyopathy (HCC) I42.9    S/P CATH - LHC AND RHC- LM-OSTAIL 10% STENOSIS, LAD-PATENT, LCX-P, RCA-P, ; RHC PAP 55/26 MEAN 38 MMHG,PCWP 26 MMHG,- TO BE SCHEDULED FOR TAVR  ASAP WITH DR. CARTAGENA Z98.890    S/P right and left heart catheterization Z98.890    Cardiac arrest ([facility] [address] I46.9    Moderate malnutrition (HCC) E44.0    Hypoxic encephalopathy (HCC) G93.1       Isolation/Infection:   Isolation          No Isolation        Patient Infection Status     Infection Onset Added Last Indicated Last Indicated By Review Planned Expiration Resolved Resolved By    None active    Resolved    COVID-19 Rule Out 12/18/20 12/18/20 12/18/20 COVID-19 (Ordered)   12/19/20 Rule-Out Test Resulted    COVID-19 Rule Out 12/11/20 12/11/20 12/11/20 Covid-19 Ambulatory (Ordered)   12/13/20 Rule-Out Test Resulted    COVID-19 Rule Out 11/24/20 11/24/20 11/24/20 COVID-19 (Ordered)   11/24/20 Rule-Out Test Resulted          Nurse Assessment:  Last Vital Signs: BP (!) 140/70   Pulse 74   Temp 97.6 °F (36.4 °C) (Oral)   Resp 18   Ht 5' 2\" (1.575 m)   Wt 123 lb (55.8 kg)   SpO2 97%   BMI 22.50 kg/m²     Last documented pain score (0-10 scale): Pain Level: 0  Last Weight:   Wt Readings from Last 1 Encounters:   12/24/20 123 lb (55.8 kg)     Mental Status:  oriented and alert    IV Access:  - None    Nursing Mobility/ADLs:  Walking   Independent  Transfer  Independent  Bathing  Independent  Dressing  Independent  Toileting  Independent  Feeding  Independent  Med Admin  Dependent  Med Delivery   none    Wound Care Documentation and Therapy:        Elimination:  Continence:   · Bowel:  Yes  · Bladder: Yes  Urinary Catheter: None   Colostomy/Ileostomy/Ileal Conduit: No       Date of Last BM: 12/30

## 2020-12-29 NOTE — PROGRESS NOTES
[address] [facility]  [address]  DAILY NOTE     TIME   SLP Individual Minutes  Time In: 1000  Time Out: 4867  Minutes: 63  Timed Code Treatment Minutes: 63 Minutes      Cognitive Tx: 63 minutes     Date: 2020  Patient Name: Radha Rendon      CSN: [id number]   : 1931  (80 y.o.)  Gender: female   Referring Physician:  Radha Chang MD  Diagnosis: hypoxic encephalopathy   Secondary Diagnosis: cognitive deficits   Precautions: fall risk  Current Diet: Regular solids with thin liquids   Swallowing Strategies: Standard Universal Swallow Precautions  Date of Last MBS: Not Applicable    Pain:  No pain reported. Subjective: Patient is alert, upright in recliner chair, and agreeable for ST treatment session. The pt is pleasant, slightly tangential, and oriented. Short-Term Goals:  SHORT TERM GOAL #1:  Goal 1: Pt will complete functional immediate/delayed recall and working memory tasks (with focus on compensatory strategies) with 75% accuracy, mod cues to improve retention of pertinent information. INTERVENTIONS: Did not address d/t focus on other goals. PRIOR SESSION:  Prior to completing introduction of memory strategies, the pt was challenged to verbalize any strategies she may use on her own. -Write it: indep  -Association: indep    Completed review of STM strategies using the acronym WRAP--Write it down, Repeat it, Associate it, and Pictures it. (Immediate Recall): / indep   (Delayed Recall): 5 minute delay- 2/ indep, 2/4 max A      SHORT TERM GOAL #2:  Goal 2: Pt will complete higher level problem solving and verbal/visual reasoning tasks with 80% accuracy, min cues to assist with independence for ADL contribution.   INTERVENTIONS:  Medication Management/ Given 6 prescriptions sort your weekly medications:  1. 1 pill daily in AM: 7/7 min A for comprehension and completion of sort 2. 1 pill daily at bed: 7/7 mod I additional time  3. 1 pill daily: 7/7 min A to determine the time of day when not specified  4. 1 pill daily in AM: 7/7 indep,    5. 1 pill 3x daily: 15/21 mod A, 6/21 max A  *POOR comprehension of prescription and severely disoriented to pill box w/ more complex meds  *omitted 4 days and inattentively put multiple in another day. 6. 2 pills 2x daily: 28/28 indep, max A  **MAX A to double check med box  **Patient stated she does NOT use a med box at home, but given her decline in STM and cognitive status, ST completed skilled education re: the importance of recalling daily medications, understanding prescriptions, and the importance of taking them at the same time each day for consistency. In addition, ST demonstrated model of how the pt could re-check if she took or forget her medication (referring back to the date/time). **Increased time required  **decreased mental flexibility     Check Organization/Registry task:  (Ck #) 3/4 indep, 1/4 mod A to determine which ck # was omitted  Date: 8/8 indep for organizing by date  Transaction: indep   Payment/Credit: indep or placement in payment vs credit  Balance/math w/ calculator: mod fading to min A  **Pt easily disorganized, missed decimal points, forgot to clear the calculator, and used incorrect function button. *decreased organization of registry information  *decreased visual scanning for transcribing information  *decreased mental flexibility. Pt continued to state; \"I would never get behind at home, so that's why this is so confusing to me now. \"   *decreased sustained attention      SHORT TERM GOAL #3:  Goal 3: Pt will complete moderately complex thought organization and executive functioning tasks (time, money/math/finances, medications, home-related) with 80% accuracy, min cues to maximize IADL completion per PLOF. INTERVENTIONS: Did not address d/t focus on other goals.        PRIOR SESSION: Organization -- target of 13 :  (Kitchen items): 12 WPM, 2 repetitions, min A  (Bathroom items): 16, 0 repeats, indep   (Sharp items): 4 WPM, 0 repeat, mod A  *decreased mental flexibility  *decreased word retrieval  *decreased success with abstract reasoning. Pt continued to stated; \"Well I don't have any of that, so how would I know. \" re: sharp items. SHORT TERM GOAL #4:  Goal 4: Pt will complete higher level comprehension (written/auditory) tasks with 80% accuracy, mod cues to assist with understanding of multi-faceted information relevant to daily living scenarios. INTERVENTIONS: Refer to gaol 2    SHORT TERM GOAL #5:  Goal 5: Pt will complete sustained, selective, and divided attention tasks with no more than 3 re-directions/errors until task completion to improve mental focus and ability to return to driving and household obligations. INTERVENTIONS: refer to goal 2    Long-Term Goals:  Timeframe for Long-term Goals: 4 weeks    LONG TERM GOAL #1:  Goal 1: Pt will improve cognitive-linguistic skilled to a mod-I level of functioning or better to safely permit return to home and previous responisibilities at discharge. Comprehension: 5 - Patient understands basic needs (hungry/hot/pain)  Expression: 5 - Expresses basic ideas/needs only (hungry/hot/pain)  Social Interaction: 6 - Patient requires medication for mood and/or effect  Problem Solvin - Patient solves simple/routine tasks 75-90%+   Memory: 3 - Patient remembers 50%-74% of the time     EDUCATION:  Learner: Patient  Education:  Reviewed results and recommendations of this evaluation, Reviewed ST goals and Plan of Care and Reviewed recommendations for follow-up  Evaluation of Education: Verbalizes understanding    ASSESSMENT/PLAN:  Activity Tolerance:  Patient tolerance of  treatment: good. Assessment/Plan: Patient progressing toward established goals. Continues to require skilled care of licensed speech pathologist to progress toward achievement of established goals and plan of care. .     Plan for Next Session: Medication management, finances management, attention     Yesica Sumner MA., CCC-SLP

## 2020-12-29 NOTE — PLAN OF CARE
Problem: DISCHARGE BARRIERS  Goal: Patient's continuum of care needs are met  12/29/2020 0926 by GERALDINE Aden  Note: Team conference held Monday, 12/28/2020. Recommendations of the team were explained to the patient by GERALDINE Laws, and Dr. Irwin Alvarado. Team is recommending that patient continue on acute inpatient rehab for three more days, with expected discharge date of Thursday, 12/31/2020. Prior to discharge, team is recommending family education. Following discharge, team is recommending home health care services for RN/PT/OT/ST/HHA/SW. Additionally, team is recommending family to provide twenty four hour supervision at time of transition home as well as providing increased intermittent support on a daily basis. Patient is very resistive to recommendation, as patient does not feel it is necessary. SW and physician attempted to provide insight regarding concerns related to patient's deficits. Care plan reviewed with patient. Patient verbalized understanding of the plan of care and contributed to goal setting. SW to follow and maintain involvement in discharge planning.

## 2020-12-29 NOTE — PROGRESS NOTES
Patient: [facility]  Unit/Bed: 2D-74/820-V  YOB: 1931  MRN: [id number] Acct: [de-identified]   Admitting Diagnosis: Hypoxic encephalopathy ([facility] [G93.1]  Admit Date:  12/24/2020  Hospital Day: 5    Assessment:     Active Problems:    Osteoarthrosis involving multiple sites    Chronic congestive heart failure (HCC)    Systolic ejection murmur 5/6 best aortic area    Cardiomyopathy ([facility]    Moderate malnutrition ([facility] [address] [facility]    Hypoxic encephalopathy ([facility] [address]  Resolved Problems:    * No resolved hospital problems. *      Plan:     Continue to follow        Subjective:     Patient has no complaint of CP, SOB, GI upset or voiding troubles. .   Medication side effects: none    Scheduled Meds:   diclofenac sodium  4 g Topical TID    aspirin  81 mg Oral Daily    clopidogrel  75 mg Oral Daily    enoxaparin  40 mg Subcutaneous Daily    atorvastatin  10 mg Oral Nightly    dicyclomine  10 mg Oral BID    furosemide  40 mg Oral Daily    metoprolol tartrate  25 mg Oral BID    potassium chloride  20 mEq Oral Daily    therapeutic multivitamin-minerals  1 tablet Oral Daily    famotidine  20 mg Oral Daily     Continuous Infusions:  PRN Meds:ondansetron, polyethylene glycol, acetaminophen, senna    Review of Systems  Pertinent items are noted in HPI. Objective:     Patient Vitals for the past 8 hrs:   BP Temp Temp src Pulse Resp SpO2   12/29/20 0800 (!) 140/70        12/29/20 0658 (!) 158/67 97.6 °F (36.4 °C) Oral 74 18 97 %     I/O last 3 completed shifts: In: 480 [P.O.:480]  Out: -   I/O this shift:   In: 400 [P.O.:400]  Out: -     BP (!) 140/70   Pulse 74   Temp 97.6 °F (36.4 °C) (Oral)   Resp 18   Ht 5' 2\" (1.575 m)   Wt 123 lb (55.8 kg)   SpO2 97%   BMI 22.50 kg/m²     BP (!) 140/70   Pulse 74   Temp 97.6 °F (36.4 °C) (Oral)   Resp 18   Ht 5' 2\" (1.575 m)   Wt 123 lb (55.8 kg)   SpO2 97%   BMI 22.50 kg/m²   General appearance: alert, appears stated age and cooperative Head: Normocephalic, without obvious abnormality, atraumatic  Lungs: clear to auscultation bilaterally  Heart: regular rate and rhythm, S1, S2 normal, no murmur, click, rub or gallop  Abdomen: soft, non-tender; bowel sounds normal; no masses,  no organomegaly  Extremities: extremities normal, atraumatic, no cyanosis or edema  Skin: Skin color, texture, turgor normal. No rashes or lesions  Neurologic: Grossly normal      Electronically signed by Alayna Crow MD on 12/29/2020 at 12:47 PM

## 2020-12-29 NOTE — PROGRESS NOTES
[facility]  INPATIENT PHYSICAL THERAPY  DAILY NOTE  [address]    Time In: 1430  Time Out: 1500  Timed Code Treatment Minutes: 30 Minutes  Minutes: 30          Date: 2020  Patient Name: Shi Polo,  Gender:  female        MRN: [id number]  : 1931  (80 y.o.)     Referring Practitioner: Ronny Mccann MD  Diagnosis: hypoxic encephalopathy  Additional Pertinent Hx: Per MD note: 80 y.o. female admitted to the inpatient rehabilitation unit on 2020. She was admitted to [facility] on 2020. Patient presented to hospital for outpatient heart cath with Dr Shanda Dempsey. Patient had proceeded up to bathroom after cath and complained of CP and dizziness. Patient became unresponsive and asystole, CPR for 2 minutes given and patient responded, but with O2 sat 74% with 100% NRB, patient was intubated and taking to ICU. Patient was taken for TAVR on 2020. Pt to IPR: 2020. Prior Level of Function:  Lives With: Alone  Type of Home: House  Home Layout: One level  Home Access: (typically goes through the garage- no hand rail, can enter through the front door with newly installed hand rail on the left)  Entrance Stairs - Number of Steps: 2-3  Home Equipment: Reacher   Bathroom Shower/Tub: Walk-in shower  Bathroom Toilet: Standard(has 1 STS & 1 ETS)    ADL Assistance: [address]: Independent  Ambulation Assistance: Independent  Transfer Assistance: Independent  Active : Yes  Additional Comments: No AD at home, fully indep PLOF. Restrictions/Precautions:  Restrictions/Precautions: Fall Risk  Position Activity Restriction  Other position/activity restrictions: s/p TAVR on 2020. Monitor pulse rate with activity. Keep <= 20 BPM increase over baseline     SUBJECTIVE: Pt in restroom. Notified PT prior to getting up to stand. PAIN: 0/10: at rest.  Min Lt rib pain with trunk twists. Educated to use less rotation to RT    OBJECTIVE:    Transfers:  Sit to Stand: Independent  Stand to Sit:Independent    Ambulation:  Supervision  Distance: 175 ft, 150 ft  Surface: Level Tile  Device:No Device  Gait Deviations:  Slow Oly. Supervision with mod cues given to use signs/symptoms or pulse rate to determine walking distance/time. Stairs:  Supervision  Number of Steps: 1  Height: 6\" step with No Device . Cue to pause after the step. Min path deviation with descent  EXERCISES:  The following exercises were completed to improve functional mobility: seated CABG 2 ex done x10 reps for warm up with walking program and 7 reps standing ex with BUE support used for cool down after walking. Pt used handout for instruction with walking program and with ex. Mod cues needed for accuracy. ASSESSMENT:  Assessment: Patient progressing toward established goals. Activity Tolerance:  Patient tolerance of  treatment: good.       Equipment Recommendations:   Discharge Recommendations:  Continue to assess pending progress, Patient would benefit from continued therapy after discharge    Plan: Times per week: 5x/wk 90min, 1x/wk 30  Current Treatment Recommendations: Strengthening, Balance Training, Endurance Training, Functional Mobility Training, Transfer Training, Gait Training, Stair training, Home Exercise Program    Patient Education  Patient Education: Home Exercise Program, Gait, Stairs,  - Patient Verbalized Understanding, - Patient Requires Continued Education    Goals:  Patient goals : go home  Short term goals  Time Frame for Short term goals: 1 week  Short term goal 1: supine to sit and return with S to get in and out of bed MET, see LTG  Short term goal 2: sit to stand with S to get on and off various surfaces MET, see lTG Short term goal 3: Pt will ambulate with least restrictive device and  feet to walk safely in the home and community distances MET, see LTG  Short term goal 4: ascend/descend 2-3 steps with SBA to enter home MET, see LTG  Long term goals  Time Frame for Long term goals : 3 weeks  Long term goal 1: supine to sit and return with I to get in and out of bed  Long term goal 2: sit to stand with I to get on and off various surfaces  Long term goal 3: Pt will ambulate without devices, showing independence with walking program, using various techniques to determine walking distance or time, for home walking program.  Long term goal 4: Pt to ascend/descend 4 steps with Rt rail, pausing after ea step, Mod I for home entry  Long term goal 5: Pt to be independent with home walking program to include warm up and cool down ex for improved endurance. Long term goal 6: Pt to get in/out of car, Mod I, for transportation needs. Following session, patient left in safe position with all fall risk precautions in place.

## 2020-12-29 NOTE — PROGRESS NOTES
[facility]  INPATIENT PHYSICAL THERAPY  DAILY NOTE  [address] [facility] [facility] [address] 7E-54/054-A    Time In: 0730  Time Out: 0830  Timed Code Treatment Minutes: 60 Minutes  Minutes: 60          Date: 2020  Patient Name: Bennie Gibson,  Gender:  female        MRN: [id number]  : 1931  (80 y.o.)     Referring Practitioner: Enrico Winter MD  Diagnosis: hypoxic encephalopathy  Additional Pertinent Hx: Per MD note: 80 y.o. female admitted to the inpatient rehabilitation unit on 2020. She was admitted to [facility] on 2020. Patient presented to hospital for outpatient heart cath with Dr Jean-Pierre Suarez. Patient had proceeded up to bathroom after cath and complained of CP and dizziness. Patient became unresponsive and asystole, CPR for 2 minutes given and patient responded, but with O2 sat 74% with 100% NRB, patient was intubated and taking to ICU. Patient was taken for TAVR on 2020. Pt to IPR: 2020. Prior Level of Function:  Lives With: Alone  Type of Home: House  Home Layout: One level  Home Access: (typically goes through the garage- no hand rail, can enter through the front door with newly installed hand rail on the left)  Entrance Stairs - Number of Steps: 2-3  Home Equipment: Reacher   Bathroom Shower/Tub: Walk-in shower  Bathroom Toilet: Standard(has 1 STS & 1 ETS)    ADL Assistance: [address] [place] [address] Avenue: Independent  Ambulation Assistance: Independent  Transfer Assistance: Independent  Active : Yes  Additional Comments: No AD at home, fully indep PLOF. Restrictions/Precautions:  Restrictions/Precautions: Fall Risk  Position Activity Restriction  Other position/activity restrictions: s/p TAVR on 2020. Monitor pulse rate with activity. Keep <= 20 BPM increase over baseline     SUBJECTIVE: Pt seated in recliner. Pleasant and cooperative. Stated she slept well. PAIN: 0/10: at rest.  Min c/o Lt rib pain with trunk twists ex. Educated to use smaller rotation range to Lt    OBJECTIVE:    Transfers:  Sit to Stand: Independent  Stand to Sit:Independent    Ambulation:  Supervision  Distance: 165 ft, 120 ft x3  Surface: Level Tile  Device:No Device  Gait Deviations:  Slow Oly and Supervision assist given for pt to recognize signs of fatigue using walk/talk test, monitoring pulse rate and RPE. Assist still needed with determining distance and remembering the techniques to use to determine the need to take a rest break. Stairs:  Stand By Assistance  Number of Steps: 4 steps x3 trials. Height: 6\" step with Bilateral Handrails   Mod cues needed for technique to pause after ea step. On descent, pt would pause with 1 foot on one step and other foot on other step. Cue and demo needed to correct. EXERCISES:  The following exercises were completed to improve functional mobility: step 2 cardiac ex x10 reps used for warm up and cool down with walking program.  Demo for technique and S for counting reps. ASSESSMENT:  Assessment: Patient progressing toward established goals. Activity Tolerance:  Patient tolerance of  treatment: good. Mod to max cues needed with walking program.  Poor recall noted by patient.   Handout issued     Equipment Recommendations:   Discharge Recommendations:  Continue to assess pending progress, Patient would benefit from continued therapy after discharge    Plan: Times per week: 5x/wk 90min, 1x/wk 30  Current Treatment Recommendations: Strengthening, Balance Training, Endurance Training, Functional Mobility Training, Transfer Training, Gait Training, Stair training, Home Exercise Program    Patient Education  Patient Education: Home Exercise Program, Stairs, handout issued for home walking program.,  - Patient Verbalized Understanding, - Patient Requires Continued Education    Goals:  Patient goals : go home  Short term goals Time Frame for Short term goals: 1 week  Short term goal 1: supine to sit and return with S to get in and out of bed MET, see LTG  Short term goal 2: sit to stand with S to get on and off various surfaces MET, see lTG  Short term goal 3: Pt will ambulate with least restrictive device and  feet to walk safely in the home and community distances MET, see LTG  Short term goal 4: ascend/descend 2-3 steps with SBA to enter home MET, see LTG  Long term goals  Time Frame for Long term goals : 3 weeks  Long term goal 1: supine to sit and return with I to get in and out of bed  Long term goal 2: sit to stand with I to get on and off various surfaces  Long term goal 3: Pt will ambulate without devices, showing independence with walking program, using various techniques to determine walking distance or time, for home walking program.  Long term goal 4: Pt to ascend/descend 4 steps with Rt rail, pausing after ea step, Mod I for home entry  Long term goal 5: Pt to be independent with home walking program to include warm up and cool down ex for improved endurance. Long term goal 6: Pt to get in/out of car, Mod I, for transportation needs. Following session, patient left in safe position with all fall risk precautions in place.

## 2020-12-29 NOTE — PROGRESS NOTES
[facility]  Recreational Therapy  Daily Note  [address]    Time Spent with Patient: 10 minutes    Date:  12/29/2020       Patient Name: Sia Felipe      MRN: [id number]      YOB: 1931 (80 y.o.)       Gender: female  Diagnosis: hypoxic encephalopathy  Referring Practitioner: Dr. Mirella Acharya    RESTRICTIONS/PRECAUTIONS:  Restrictions/Precautions: Fall Risk  Vision: Within Functional Limits  Hearing: Within functional limits    PAIN: 0-no c/o pain     SUBJECTIVE:  I am fine     OBJECTIVE:  Pt content in room watching tv and on her phone-states therapy is going well and looking forward to going home on Thursday-requested cup of coffee-pleasant and social -no leisure needs      Patient Education  New Education Provided: Importance of Leisure,     Electronically signed by: YULISA Jones  Date: 12/29/2020

## 2020-12-29 NOTE — PLAN OF CARE
Problem: DISCHARGE BARRIERS  Goal: Patient's continuum of care needs are met  12/29/2020 1634 by GERALDINE Ordonez  Note: SW contacted patient's step-daughter, Zoran Daugherty, to provide team conference update and further discuss discharge planning. SW reviewed with step-daughterZoran, team conference recommendations with discharge for Thursday, 12/31/2020. SW discussed recommendation for twenty four hour supervision for a few days to initiate/establish a routine at home, assistance with finances, assistance with medications, as well as continued intermittent support. Additionally, it is recommended that patient does not return to driving without completing a driving evaluation. SW discussed recommendation for home health care services at discharge to provide RN/PT/OT/ST/HHA/SW. SW reviewed recommendation for family to complete family education prior to discharge. Step-daughterZoran, indicates availability on Thursday, 12/31/2020, prior to discharge. Family education scheduled for Thursday, 12/31/2020, beginning at 9:00am. Therapy board updated accordingly. SW to follow up with patient regarding home health care services. Care plan reviewed with patient's step-daughterZoran. Patient's step-daughterZoran, verbalized understanding of the plan of care and contributed to goal setting. SW to follow and maintain involvement in discharge planning.

## 2020-12-29 NOTE — PROGRESS NOTES
[address] [place] [address]  Occupational Therapy  Daily Note  Time:   Time In: 1400  Time Out: 1430  Timed Code Treatment Minutes: 30 Minutes  Minutes: 30          Date: 2020  Patient Name: Bev Live,   Gender: female      Room: [facility]/054-A  MRN: [id number]  : 1931  (80 y.o.)  Referring Practitioner: Dr. Liliana Díaz  Diagnosis: hypoxic encephalopathy  Additional Pertinent Hx: Per MD note: 80 y.o. female admitted to the inpatient rehabilitation unit on 2020. She was admitted to [address] [place] [address] on 2020. Patient presented to hospital for outpatient heart cath with Dr Radha Nye. Patient had proceeded up to bathroom after cath and complained of CP and dizziness. Patient became unresponsive and asystole, CPR for 2 minutes given and patient responded, but with O2 sat 74% with 100% NRB, patient was intubated and taking to ICU. Patient was taken for TAVR on 2020. Restrictions/Precautions:  Restrictions/Precautions: Fall Risk  Position Activity Restriction  Other position/activity restrictions: s/p TAVR on 2020. Monitor pulse rate with activity. Keep <= 20 BPM increase over baseline     SUBJECTIVE: Pt seated in chair, on phone, beginning of session; agreeable to therapy this date. Pt able to recall morning therapy session with max detail to tasks completed. PAIN: pt states no pain    COGNITION: Decreased Insight, Decreased Problem Solving and Decreased Safety Awareness    ADL:   No ADL's completed this session. Jeronimo Guajardo BALANCE:  Sitting Balance:  Modified Independent. couch   Standing Balance: Supervision. dynamic standing with min verbal cues for body positioning    BED MOBILITY:  Not Tested    TRANSFERS:  Sit to Stand:  Supervision. cues for safety  Stand to Sit: Supervision. to couch, chair    FUNCTIONAL MOBILITY:  Assistive Device: None  Assist Level:  Stand By Assistance. Distance:  To and from therapy apartment Requires x 5min rest break x 2     ADDITIONAL ACTIVITIES:  Pt completed homemaking task requiring min verbal cues dt perseveration to correct placement of blanket demonstrating decreased problem solving with orientation. Pt able to locate and remove 4/4 trip hazards independently demonstrating increased safety awareness. ASSESSMENT:     Activity Tolerance:  Patient tolerance of  treatment: good. Discharge Recommendations: Continue to assess pending progress, 24 hour supervision or assist, Home with nursing aide, Home with Home health OT(no driving. Driving evaluation when medically able.)   Equipment Recommendations: Equipment Needed: Yes  Other: Recommend a shower chair. Plan: Times per week: 90 minutes 5x/wk, 30 minutes 1x/wk  Current Treatment Recommendations: Functional Mobility Training, Endurance Training, Safety Education & Training, Self-Care / ADL, Balance Training, Cognitive Reorientation    Patient Education  Patient Education: Role of OT, Energy Conservation, Home Safety, Education Related to Potential Risks and Complications Due to Impairment/Illness/Injury and Home Safety Education    Goals  Short term goals  Time Frame for Short term goals: 1 week  Short term goal 1: Pt will complete BADL routine with S & 0-2 vcs for safety & sequencing  Short term goal 2: Pt will complete simple meal prep task with S & 0-2 vcs for safety  Short term goal 3: Pt will complete 8-10 min dynamic standing task with S for increased ease of returing to laundry tasks  Short term goal 4: Pt will complete IADL tasks with S & 0-2 vcs for walker safety  Long term goals  Time Frame for Long term goals : 2 weeks  Long term goal 1: Pt will complete BADL with mod I & 0-1 vcs for safety  Long term goal 2: Pt will complete simple meal prep tasks with mod I & 0-1 vcs for safety    Following session, patient left in safe position with all fall risk precautions in place.

## 2020-12-30 PROCEDURE — 97535 SELF CARE MNGMENT TRAINING: CPT

## 2020-12-30 PROCEDURE — 97116 GAIT TRAINING THERAPY: CPT

## 2020-12-30 PROCEDURE — 97530 THERAPEUTIC ACTIVITIES: CPT

## 2020-12-30 PROCEDURE — 1180000000 HC REHAB R&B

## 2020-12-30 PROCEDURE — 97110 THERAPEUTIC EXERCISES: CPT

## 2020-12-30 PROCEDURE — 6360000002 HC RX W HCPCS: Performed by: PHYSICIAN ASSISTANT

## 2020-12-30 PROCEDURE — 6370000000 HC RX 637 (ALT 250 FOR IP): Performed by: PHYSICIAN ASSISTANT

## 2020-12-30 PROCEDURE — 6370000000 HC RX 637 (ALT 250 FOR IP): Performed by: PHYSICAL MEDICINE & REHABILITATION

## 2020-12-30 PROCEDURE — 99232 SBSQ HOSP IP/OBS MODERATE 35: CPT | Performed by: PHYSICAL MEDICINE & REHABILITATION

## 2020-12-30 PROCEDURE — 6370000000 HC RX 637 (ALT 250 FOR IP): Performed by: FAMILY MEDICINE

## 2020-12-30 PROCEDURE — 97130 THER IVNTJ EA ADDL 15 MIN: CPT | Performed by: SPEECH-LANGUAGE PATHOLOGIST

## 2020-12-30 PROCEDURE — 97129 THER IVNTJ 1ST 15 MIN: CPT | Performed by: SPEECH-LANGUAGE PATHOLOGIST

## 2020-12-30 RX ORDER — FUROSEMIDE 40 MG/1
40 TABLET ORAL DAILY
Qty: 30 TABLET | Refills: 1 | Status: SHIPPED | OUTPATIENT
Start: 2020-12-31 | End: 2021-03-19

## 2020-12-30 RX ORDER — POTASSIUM CHLORIDE 20 MEQ/1
20 TABLET, EXTENDED RELEASE ORAL DAILY
Qty: 30 TABLET | Refills: 1 | Status: SHIPPED | OUTPATIENT
Start: 2020-12-31 | End: 2021-03-19

## 2020-12-30 RX ORDER — ACETAMINOPHEN 325 MG/1
325 TABLET ORAL EVERY 6 HOURS PRN
COMMUNITY
Start: 2020-12-30

## 2020-12-30 RX ORDER — DICYCLOMINE HYDROCHLORIDE 10 MG/1
10 CAPSULE ORAL 2 TIMES DAILY
COMMUNITY
Start: 2020-12-30

## 2020-12-30 RX ORDER — CLOPIDOGREL BISULFATE 75 MG/1
75 TABLET ORAL DAILY
Qty: 30 TABLET | Refills: 1 | Status: SHIPPED | OUTPATIENT
Start: 2020-12-31 | End: 2021-01-27 | Stop reason: SDUPTHER

## 2020-12-30 RX ADMIN — DICLOFENAC 4 G: 10 GEL TOPICAL at 20:17

## 2020-12-30 RX ADMIN — CLOPIDOGREL BISULFATE 75 MG: 75 TABLET ORAL at 08:27

## 2020-12-30 RX ADMIN — DICLOFENAC 4 G: 10 GEL TOPICAL at 08:27

## 2020-12-30 RX ADMIN — FAMOTIDINE 20 MG: 20 TABLET, FILM COATED ORAL at 08:27

## 2020-12-30 RX ADMIN — MULTIPLE VITAMINS W/ MINERALS TAB 1 TABLET: TAB at 08:28

## 2020-12-30 RX ADMIN — DICYCLOMINE HYDROCHLORIDE 10 MG: 10 CAPSULE ORAL at 20:17

## 2020-12-30 RX ADMIN — DICYCLOMINE HYDROCHLORIDE 10 MG: 10 CAPSULE ORAL at 08:27

## 2020-12-30 RX ADMIN — DICLOFENAC 4 G: 10 GEL TOPICAL at 12:56

## 2020-12-30 RX ADMIN — ACETAMINOPHEN 500 MG: 500 TABLET ORAL at 20:18

## 2020-12-30 RX ADMIN — ENOXAPARIN SODIUM 40 MG: 40 INJECTION SUBCUTANEOUS at 08:27

## 2020-12-30 RX ADMIN — METOPROLOL TARTRATE 25 MG: 25 TABLET ORAL at 08:28

## 2020-12-30 RX ADMIN — ASPIRIN 81 MG: 81 TABLET, COATED ORAL at 08:27

## 2020-12-30 RX ADMIN — FUROSEMIDE 40 MG: 40 TABLET ORAL at 08:28

## 2020-12-30 RX ADMIN — ATORVASTATIN CALCIUM 10 MG: 10 TABLET, FILM COATED ORAL at 20:16

## 2020-12-30 RX ADMIN — POTASSIUM CHLORIDE 20 MEQ: 1500 TABLET, EXTENDED RELEASE ORAL at 08:27

## 2020-12-30 RX ADMIN — METOPROLOL TARTRATE 25 MG: 25 TABLET ORAL at 20:17

## 2020-12-30 ASSESSMENT — PAIN - FUNCTIONAL ASSESSMENT: PAIN_FUNCTIONAL_ASSESSMENT: ACTIVITIES ARE NOT PREVENTED

## 2020-12-30 ASSESSMENT — PAIN SCALES - GENERAL: PAINLEVEL_OUTOF10: 1

## 2020-12-30 NOTE — PROGRESS NOTES
[address] [place] [address]  INPATIENT PHYSICAL THERAPY  DAILY NOTE  [facility] - 7E-54/054-A    Time In: 0730  Time Out: 0830  Timed Code Treatment Minutes: 60 Minutes  Minutes: 60          Date: 2020  Patient Name: Batsheva Vuong,  Gender:  female        MRN: [id number]  : 1931  (80 y.o.)     Referring Practitioner: Roshni Patel MD  Diagnosis: hypoxic encephalopathy  Additional Pertinent Hx: Per MD note: 80 y.o. female admitted to the inpatient rehabilitation unit on 2020. She was admitted to [address] [place] [address] on 2020. Patient presented to hospital for outpatient heart cath with Dr Yuko Weaver. Patient had proceeded up to bathroom after cath and complained of CP and dizziness. Patient became unresponsive and asystole, CPR for 2 minutes given and patient responded, but with O2 sat 74% with 100% NRB, patient was intubated and taking to ICU. Patient was taken for TAVR on 2020. Pt to IPR: 2020. Prior Level of Function:  Lives With: Alone  Type of Home: House  Home Layout: One level  Home Access: (typically goes through the garage- no hand rail, can enter through the front door with newly installed hand rail on the left)  Entrance Stairs - Number of Steps: 2-3  Home Equipment: Reacher   Bathroom Shower/Tub: Walk-in shower  Bathroom Toilet: Standard(has 1 STS & 1 ETS)    ADL Assistance: [address] [place] [address] Avenue: Independent  Ambulation Assistance: Independent  Transfer Assistance: Independent  Active : Yes  Additional Comments: No AD at home, fully indep PLOF. Restrictions/Precautions:  Restrictions/Precautions: Fall Risk  Position Activity Restriction  Other position/activity restrictions: s/p TAVR on 2020. Monitor pulse rate with activity. Keep <= 20 BPM increase over baseline     SUBJECTIVE: pt in recliner upon arrival and agrees to therapy.  Pt [place], moaning and groaning in pain with mobility PAIN: L side chest wall/ribs with mobility 4/10    OBJECTIVE:  Bed Mobility:  Rolling to Left: Independent   Rolling to Right: Independent   Supine to Sit: Independent  Sit to Supine: Independent   Scooting: Independent    Transfers:  Sit to Stand: Independent  Stand to Sit:Independent  Car:Modified Independent  bed to chair: Modified Independent    Ambulation:  Modified Independent  Distance: [id number] and mult shorter distances around gym/easy st  Surface: Level Tile, Uneven Surface, Carpet and Ramp  Device:No Device  Gait Deviations:  Slow Oly, Decreased Step Length Bilaterally, Decreased Gait Speed and Mild Path Deviations    Stairs:  Modified Independent  Number of Steps: 1  Height: 6\" porch step with No Device  Good technique, steady    Stairs:  Modified Independent  Number of Steps: 12  Height: 6\" step with One Handrail  Good technique, caught toe on 1 step however able to self correct without LOB    Balance:  Dynamic Standing Balance: Modified Independent  Pt reaching to floor to  an object and then placed it into bucket on floor across room    Exercise:  Patient was guided in 1 set(s) 20 reps of exercise to both lower extremities. Ankle pumps, Glut sets, Heelslides, Short arc quads, Hip abduction/adduction, Hooklying hip abduction/adduction, Pelvic tilts, Lower trunk rotations and hooklying ball squeezes, abd bracing. Exercises were completed for increased independence with functional mobility. Functional Outcome Measures: Completed       ASSESSMENT:  Assessment: Patient progressing toward established goals. Activity Tolerance:  Patient tolerance of  treatment: good.       Equipment Recommendations:   Discharge Recommendations:  Continue to assess pending progress, Patient would benefit from continued therapy after discharge    Plan: Times per week: 5x/wk 90min, 1x/wk 30 Current Treatment Recommendations: Strengthening, Balance Training, Endurance Training, Functional Mobility Training, Transfer Training, Gait Training, Stair training, Home Exercise Program    Patient Education  Patient Education: Plan of Care, [employer] Mobility, Transfers, Gait, Stairs, Car Transfers, Verbal Exercise Instruction, Home Safety Education    Goals:  Patient goals : go home  Short term goals  Time Frame for Short term goals: 1 week  Short term goal 1: supine to sit and return with S to get in and out of bed MET, see LTG  Short term goal 2: sit to stand with S to get on and off various surfaces MET, see lTG  Short term goal 3: Pt will ambulate with least restrictive device and  feet to walk safely in the home and community distances MET, see LTG  Short term goal 4: ascend/descend 2-3 steps with SBA to enter home MET, see LTG  Long term goals  Time Frame for Long term goals : 3 weeks  Long term goal 1: supine to sit and return with I to get in and out of bed  Long term goal 2: sit to stand with I to get on and off various surfaces  Long term goal 3: Pt will ambulate without devices, showing independence with walking program, using various techniques to determine walking distance or time, for home walking program.  Long term goal 4: Pt to ascend/descend 4 steps with Rt rail, pausing after ea step, Mod I for home entry  Long term goal 5: Pt to be independent with home walking program to include warm up and cool down ex for improved endurance. Long term goal 6: Pt to get in/out of car, Mod I, for transportation needs. Following session, patient left in safe position with all fall risk precautions in place.

## 2020-12-30 NOTE — PROGRESS NOTES
[address] [place] [address]  [facility]  Occupational Therapy  Daily Note  Time:    Time In: 3917  Time Out: 1215  Timed Code Treatment Minutes: 30 Minutes  Minutes: 30          Date: 2020  Patient Name: Manny Castro,   Gender: female      Room: -54/054-A  MRN: [id number]  : 1931  (80 y.o.)  Referring Practitioner: Dr. Frida Gamboa  Diagnosis: hypoxic encephalopathy  Additional Pertinent Hx: Per MD note: 80 y.o. female admitted to the inpatient rehabilitation unit on 2020. She was admitted to [address] [place] [address] on 2020. Patient presented to hospital for outpatient heart cath with Dr Senia Cole. Patient had proceeded up to bathroom after cath and complained of CP and dizziness. Patient became unresponsive and asystole, CPR for 2 minutes given and patient responded, but with O2 sat 74% with 100% NRB, patient was intubated and taking to ICU. Patient was taken for TAVR on 2020. Restrictions/Precautions:  Restrictions/Precautions: Fall Risk  Position Activity Restriction  Other position/activity restrictions: s/p TAVR on 2020. Monitor pulse rate with activity. Keep <= 20 BPM increase over baseline      SUBJECTIVE: cooperativem min tangiential. Increased time needed for problem solving during IADL task    PAIN: 0/10: denied pain     COGNITION: Decreased Insight and Impaired Attention    ADL:   Grooming: Independent. Toileting: Modified Independent. Toilet Transfer: Independent. STS. BALANCE:  Sitting Balance:  Independent. Standing Balance: Modified Independent. BED MOBILITY:  Not Tested    TRANSFERS:  Sit to Stand:  Independent. Stand to Sit: Independent. FUNCTIONAL MOBILITY:  Assistive Device: None  Assist Level:  Modified Independent. Distance: To and from bathroom and to and from the grocery store on [employer] environment, min cues for pathfinding.          ADDITIONAL ACTIVITIES: PT completed community re entry task of recalling a list of 5 grocery items, 5/5 recall with immediate recall with pt attempting to group items to increase memory without cues. Min increased time to recall the list with the grouped items. Pt able to identify 4/5 items after a 10 minute delay with increased time needed. Pt able to then compare the list with the items purchased and self correct the error . Pt with fair organization and awareness to locate something to carry the items vs carrying them within the grocery store in an organized pattern. Pt able to calculate amount of the bill within 25 cent increments with min increased time. Pt able to calculate exact amounts for payments with increased time and 3 trials. Increased time to achieve 100 % accuracy for less cash received with 3 trials. Discussed progression of rehab and continuation of pushing cognition and safety awareness. Pt continues with fair comprehension skills and insight into deficits. ASSESSMENT:     Activity Tolerance:  Patient tolerance of  treatment: good. Discharge Recommendations: Continue to assess pending progress, 24 hour supervision or assist, Home with nursing aide, Home with Home health OT(no driving. Driving evaluation when medically able.)      Equipment Recommendations: Equipment Needed: Yes  Other: Recommend a shower chair.   Plan: Times per week: 90 minutes 5x/wk, 30 minutes 1x/wk  Current Treatment Recommendations: Functional Mobility Training, Endurance Training, Safety Education & Training, Self-Care / ADL, Balance Training, Cognitive Reorientation    Patient Education  Patient Education: IADL's\    Goals  Short term goals  Time Frame for Short term goals: 1 week  Short term goal 1: Pt will complete BADL routine with S & 0-2 vcs for safety & sequencing  Short term goal 2: Pt will complete simple meal prep task with S & 0-2 vcs for safety

## 2020-12-30 NOTE — PROGRESS NOTES
[address] [place] [address]  INPATIENT PHYSICAL THERAPY  DAILY NOTE  254 [facility] - 7E-54/054-A    Time In: 0930  Time Out: 1000  Timed Code Treatment Minutes: 30 Minutes  Minutes: 30          Date: 2020  Patient Name: Stewart House,  Gender:  female        MRN: [id number]  : 1931  (80 y.o.)     Referring Practitioner: Jodi Chanel MD  Diagnosis: hypoxic encephalopathy  Additional Pertinent Hx: Per MD note: 80 y.o. female admitted to the inpatient rehabilitation unit on 2020. She was admitted to [address] [place] [address] on 2020. Patient presented to hospital for outpatient heart cath with Dr Emilia Morrison. Patient had proceeded up to bathroom after cath and complained of CP and dizziness. Patient became unresponsive and asystole, CPR for 2 minutes given and patient responded, but with O2 sat 74% with 100% NRB, patient was intubated and taking to ICU. Patient was taken for TAVR on 2020. Pt to IPR: 2020. Prior Level of Function:  Lives With: Alone  Type of Home: House  Home Layout: One level  Home Access: (typically goes through the garage- no hand rail, can enter through the front door with newly installed hand rail on the left)  Entrance Stairs - Number of Steps: 2-3  Home Equipment: Reacher   Bathroom Shower/Tub: Walk-in shower  Bathroom Toilet: Standard(has 1 STS & 1 ETS)    ADL Assistance: [address] [place] [address] Avenue: Independent  Ambulation Assistance: Independent  Transfer Assistance: Independent  Active : Yes  Additional Comments: No AD at home, fully indep PLOF. Restrictions/Precautions:  Restrictions/Precautions: Fall Risk  Position Activity Restriction  Other position/activity restrictions: s/p TAVR on 2020. Monitor pulse rate with activity. Keep <= 20 BPM increase over baseline     SUBJECTIVE: pt up in bathroom upon arriva, pleasant and cooperative for therapy.     PAIN: L side chest wall/ribs 4/10 with movement OBJECTIVE:  Bed Mobility:  Not Tested    Transfers:  Sit to Stand: Independent  Stand to Sit:Independent    Ambulation:  Modified Independent  Distance: 827imu2 and 50ftx1  Surface: Level Tile  Device:No Device  Gait Deviations:  Slow Oly, Decreased Step Length Bilaterally and Decreased Gait Speed    Balance:  TINETTI BALANCE TEST    BALANCE Patient is seated in a hard, armless chair   1 SITTING BALANCE 1 - Steady, safe   2. RISES FROM CHAIR 2 - Able without use of arms   3. ATTEMPTS TO RISE 2 - Able to rise, 1 attempt   4. IMMEDIATE STANDING BALANCE (FIRST 5 SECONDS) 2 - Steady without walker or other support   5. STANDING BALANCE 2 - Narrow stance without support   6. NUDGED 2 - Steady   7. EYES CLOSED 1 - Steady   8. TURNING 360 DEGREES 1 - Continuous and 1 - Steady   9. SITTING DOWN 2 - Safe,smooth motion   BALANCE SCORE 16/16       GAIT SECTION Patient stands with therapist, walks across room (+/- aids), fist at usual pace, then at rapid pace. 1.  INDICATION OF GAIT (Immediately after told to \"go\" 1 - No hesitancy   2. STEP LENGTH AND HEIGHT 1 - Step through Right and 1 - Step through Left   3. FOOT CLEARANCE 1 - Left foot clears floor and 1 - Right foot clears floor   4. STEP SYMMETRY 1 - Right and left step length appear equal   5. STEP CONTINUITY 1 - Steps appear continuous   6. PATH 2 - Straight without walking aid   7. TRUNK 2 - No sway, flexion, use of arms or walking aid   8. WALKING TIME 1 - Heels almost touching while walking   GAIT SCORE 12/12   TOTAL SCORE 28/28   Risk Indicators:  Less than/equal to 18 = high risk  19-23 Moderate risk  Greater than/equal to 24 = low risk Low risk           Exercise:  NuStep level 3 x10 mins for improved strength and endurance. SpO2 >95% and HR upper 70s-88    Functional Outcome Measures: Not completed       ASSESSMENT:  Assessment: Patient progressing toward established goals. Activity Tolerance:  Patient tolerance of  treatment: good. Equipment Recommendations:   Discharge Recommendations:  Continue to assess pending progress, Patient would benefit from continued therapy after discharge    Plan: Times per week: 5x/wk 90min, 1x/wk 30  Current Treatment Recommendations: Strengthening, Balance Training, Endurance Training, Functional Mobility Training, Transfer Training, Gait Training, Stair training, Home Exercise Program    Patient Education  Patient Education: Plan of Care, Transfers, Gait, Verbal Exercise Instruction    Goals:  Patient goals : go home  Short term goals  Time Frame for Short term goals: 1 week  Short term goal 1: supine to sit and return with S to get in and out of bed MET, see LTG  Short term goal 2: sit to stand with S to get on and off various surfaces MET, see lTG  Short term goal 3: Pt will ambulate with least restrictive device and  feet to walk safely in the home and community distances MET, see LTG  Short term goal 4: ascend/descend 2-3 steps with SBA to enter home MET, see LTG  Long term goals  Time Frame for Long term goals : 3 weeks  Long term goal 1: supine to sit and return with I to get in and out of bed MET  Long term goal 2: sit to stand with I to get on and off various surfaces MET  Long term goal 3: Pt will ambulate without devices, showing independence with walking program, using various techniques to determine walking distance or time, for home walking program. NOT MET  Long term goal 4: Pt to ascend/descend 4 steps with Rt rail, pausing after ea step, Mod I for home entry MET  Long term goal 5: Pt to be independent with home walking program to include warm up and cool down ex for improved endurance. Long term goal 6: Pt to get in/out of car, Mod I, for transportation needs. MET    Following session, patient left in safe position with all fall risk precautions in place.

## 2020-12-30 NOTE — PROGRESS NOTES
Comprehensive Nutrition Assessment    Type and Reason for Visit:  Reassess    Nutrition Recommendations/Plan: Continue current diet, Ensure Enlive daily and MVI daily. Nutrition Assessment:    Pt improving from a nutritional standpoint AEB intake mostly % meals and good acceptance of Ensure enlive. Remains at risk for further nutritional compromise r/t admit with respiratory failure and cardiac arrest and underlying medical condition (hx CAD, anemia, impaired glucose tolerance). Nutrition recommendations/interventions as per above. Malnutrition Assessment:  Malnutrition Status: At risk for malnutrition (Comment)    Context:  Acute Illness     Findings of the 6 clinical characteristics of malnutrition:  Energy Intake:  Mild decrease in energy intake (Comment)(pt consuming 51-75% of some meals over the past few days)  Weight Loss:  No significant weight loss     Body Fat Loss:  No significant body fat loss     Muscle Mass Loss:  1 - Mild muscle mass loss Temples (temporalis)  Fluid Accumulation:  No significant fluid accumulation Extremities   Strength:  Not Performed      Nutrition Related Findings:  Patient seen during breakfast, reports good intake of meals and good acceptance of Ensure; BM x 2 past 24 hours; bentyl, lasix, MVI; glucose (12/25) 95      Wounds:  None       Current Nutrition Therapies:    Dietary Nutrition Supplements: Standard High Calorie Oral Supplement  DIET GENERAL; Anthropometric Measures:  · Height: 5' 2\" (157.5 cm)  · Current Body Weight: 122 lb 9.6 oz (55.6 kg)(12/24; +trace edema noted)   · Admission Body Weight: 122 lb 9.6 oz (55.6 kg)(12/24; +trace edema noted)    · Usual Body Weight: (115-120 lbs per pt report.  Per EMR: Per EMR: 121 lb 9.6 oz on 12/11/20; 119 lb 6.4 oz on 11/30/20)     · Ideal Body Weight: 110 lbs;      · BMI: 22.4  · BMI Categories: Normal Weight (BMI 22.0 to 24.9) age over 72       Nutrition Diagnosis: · Inadequate protein-energy intake related to inadequate protein-energy intake as evidenced by mild muscle loss(some po intake less than 75%)      Nutrition Interventions:   Food and/or Nutrient Delivery:  Continue Current Diet, Continue Oral Nutrition Supplement, Vitamin Supplement  Nutrition Education/Counseling:  Education initiated(Encouraged po intake of meals and ONS at best effort)   Coordination of Nutrition Care:  Continue to monitor while inpatient    Goals:  Pt will consume 75% or more of meals during LOS       Nutrition Monitoring and Evaluation:   Behavioral-Environmental Outcomes:  None Identified   Food/Nutrient Intake Outcomes:  Food and Nutrient Intake, Supplement Intake, Vitamin/Mineral Intake  Physical Signs/Symptoms Outcomes:  Biochemical Data, Weight, Skin, Nutrition Focused Physical Findings, Fluid Status or Edema     Discharge Planning:     Too soon to determine     Electronically signed by Mary Wagoner RD, LD on 12/30/20 at 9:00 AM EST    Contact: [phone]

## 2020-12-30 NOTE — PLAN OF CARE
Problem: DISCHARGE BARRIERS  Goal: Patient's continuum of care needs are met  12/30/2020 1817 by GERALDINE Colmenares  Note: SW met with patient to further discuss discharge planning and options for home health care. Patient is requesting [address] [place] [address] to provide recommended services for discharge that include RN/PT/OT/ST/HHA/SW. SW contacted [address] [place] [address] with referral for RN/PT/OT/ST/HHA/SW. Referral information and discharge date of Thursday, 12/31/2020, provided to [facility]. Agency has access to referral information. SW to follow and maintain involvement in discharge planning.

## 2020-12-30 NOTE — PROGRESS NOTES
[facility] THERAPY  [facility]  DAILY NOTE     TIME   SLP Individual Minutes  Time In: 1005  Time Out: 1110  Minutes: 65  Timed Code Treatment Minutes: 65 Minutes      Cognitive Tx: 65 minutes     Date: 2020  Patient Name: Angus Martins      CSN: [id number]   : 1931  (80 y.o.)  Gender: female   Referring Physician:  Fatmata Delacruz MD  Diagnosis: hypoxic encephalopathy   Secondary Diagnosis: cognitive deficits   Precautions: fall risk  Current Diet: Regular solids with thin liquids   Swallowing Strategies: Standard Universal Swallow Precautions  Date of Last MBS: Not Applicable    Pain:  No pain reported. Subjective: Patient pleasant and agreeable to treatment session. Tangential speech evident throughout session, requiring re-direction back to task frequently. Reduced comprehension of tasks and how they relate to daily functioning frequently throughout session, requiring frequent education regarding rationale of tasks. Short-Term Goals:  SHORT TERM GOAL #1:  Goal 1: Pt will complete functional immediate/delayed recall and working memory tasks (with focus on compensatory strategies) with 75% accuracy, mod cues to improve retention of pertinent information. INTERVENTIONS: Recall of tasks completed in prior ST session- 2/2 for specific tasks with provision of additional details. Reviewed STM strategies using the acronym WRAP--Write it down, Repeat it, Associate it, and Pictures it. Patient able to recall 2/4 strategies independently, 2/4 additional with multiple choice cues. SHORT TERM GOAL #2:  Goal 2: Pt will complete higher level problem solving and verbal/visual reasoning tasks with 80% accuracy, min cues to assist with independence for ADL contribution. INTERVENTIONS: Reasoning related to information on a prescription label- 7/10 indep, 2/10 with min cues, /10 with mod cues. *Cuing needed to reference information listed on the label to clarify instructions, as well as for providing how she would re-fill prescriptions within her own process. SHORT TERM GOAL #3:  Goal 3: Pt will complete moderately complex thought organization and executive functioning tasks (time, money/math/finances, medications, home-related) with 80% accuracy, min cues to maximize IADL completion per PLOF. INTERVENTIONS:  Calendar organization task- Patient given a blank calendar for January 2021 with a list of 3 appointments that she has scheduled. Patient prompted to fill in the appointments on her schedule as she would do at home.   -Increased time needed to complete task, with patient attempting to figure in driving time and adjusting times incorrectly . -3/6 indep, 3/6 with mod verbal cues to add missing details (ie: name of physician, type of procedure), as well as correcting mis information (patient writing \"2:15\" for a \"2:00\" appointment, stating \"I added driving time. \" No awareness that the driving time would need to come prior to the session and not after). Time word problems: 4/9 indep, 2/9 with min cues, 3/9 with mod cues. *Patient reporting not understanding \"functional math problems,\" and stating she wouldn't \"do it that way. \"   *Min cues needed for task analysis, but with appropriate math computation  *Mod cues needed for task analysis, problem set up, and math computation. SHORT TERM GOAL #4:  Goal 4: Pt will complete higher level comprehension (written/auditory) tasks with 80% accuracy, mod cues to assist with understanding of multi-faceted information relevant to daily living scenarios. INTERVENTIONS: Refer to goal 2    SHORT TERM GOAL #5:  Goal 5: Pt will complete sustained, selective, and divided attention tasks with no more than 3 re-directions/errors until task completion to improve mental focus and ability to return to driving and household obligations. INTERVENTIONS: Divided attention task: patient prompted to complete a double target visual scanning task while simultaneously listening for an auditory target. Patient sustained attention to task for 4 minutes 29 seconds with x2 errors on the visual scanning portion and x1 error with identifying auditory targets. Overall good success with task this session. Long-Term Goals:  Timeframe for Long-term Goals: 4 weeks    LONG TERM GOAL #1:  Goal 1: Pt will improve cognitive-linguistic skilled to a mod-I level of functioning or better to safely permit return to home and previous responisibilities at discharge. Comprehension: 5 - Patient understands basic needs (hungry/hot/pain)  Expression: 5 - Expresses basic ideas/needs only (hungry/hot/pain)  Social Interaction: 6 - Patient requires medication for mood and/or effect  Problem Solvin - Patient solves simple/routine tasks 75-90%+   Memory: 4 - Patient remembers 75-90%+ of the time     EDUCATION:  Learner: Patient  Education:  Reviewed ST goals and Plan of Care, Reviewed recommendations for follow-up, Home Safety Education and Suggestions for compensatory strategies to be used in the home environment  Evaluation of Education: Verbalizes understanding    ASSESSMENT/PLAN:  Activity Tolerance:  Patient tolerance of  treatment: good. Assessment/Plan: Patient progressing toward established goals. Continues to require skilled care of licensed speech pathologist to progress toward achievement of established goals and plan of care. .     Plan for Next Session: Family education     Nighat Davies.  [address] [facility], [facility] [address], [address]

## 2020-12-30 NOTE — PROGRESS NOTES
[facility]  [address] [facility]  Occupational Therapy  Daily Note  Time:    Time In: 1410  Time Out: 1510  Timed Code Treatment Minutes: 60 Minutes  Minutes: 60          Date: 2020  Patient Name: Tenisha Mcclelland,   Gender: female      Room: [facility]/054-A  MRN: [id number]  : 1931  (80 y.o.)  Referring Practitioner: Dr. Tami Khalil  Diagnosis: hypoxic encephalopathy  Additional Pertinent Hx: Per MD note: 80 y.o. female admitted to the inpatient rehabilitation unit on 2020. She was admitted to [facility] on 2020. Patient presented to hospital for outpatient heart cath with Dr Eileen Guerrero. Patient had proceeded up to bathroom after cath and complained of CP and dizziness. Patient became unresponsive and asystole, CPR for 2 minutes given and patient responded, but with O2 sat 74% with 100% NRB, patient was intubated and taking to ICU. Patient was taken for TAVR on 2020. Restrictions/Precautions:  Restrictions/Precautions: Fall Risk  Position Activity Restriction  Other position/activity restrictions: s/p TAVR on 2020. Monitor pulse rate with activity. Keep <= 20 BPM increase over baseline      SUBJECTIVE: cooperative, motivated, min cues for problem solving and recall of items setup in the BR,     PAIN:  0/10: denied pain     COGNITION: Decreased Recall and Decreased Insight    ADL:     EATING:Independent. Karla Nutley CARE Score: 6. ORAL HYGIENE:Independent. Karla Nutley CARE Score: 6. TOILETING HYGIENE:Independent. Karla Nutley CARE Score: 6. SHOWERING/BATHING:Independent. stood to shower, seated to wash and dry feet on a shower chair. Karla Nutley CARE Score: 6. UPPER BODY DRESSING:Independent. Karla Nutley CARE Score: 6. LOWER BODY DRESSING:Independent. Karla Nutley CARE Score: 6. FOOTWEAR:Independent   . CARE Score: 6. TOILET TRANSFER: Independent. Karla Nutley CARE Score: 6. BALANCE:  Sitting Balance:  Independent. Standing Balance: Modified Independent. BED MOBILITY:  Not Tested    TRANSFERS:  Sit to Stand:  Independent. Stand to Sit: Independent. FUNCTIONAL MOBILITY:  Assistive Device: None  Assist Level:  Modified Independent. Distance: To and from shower room  Min cues for pathfinding. ADDITIONAL ACTIVITIES:   Pt inquiring about getting access to her medical records from her acute care stay for procedures and \"what happened to me. \" Nursing notified of request. Discussed home safety and having supervision for cooking and advanced IADL tasks. Pt unaware of deficits and awareness of any safety concerns regarding kitchen safety . Will further review education and recommendations with pt and step - daughter with family educaiton. ASSESSMENT:     Activity Tolerance:  Patient tolerance of  treatment: good. Discharge Recommendations: Continue to assess pending progress, 24 hour supervision or assist, Home with nursing aide, Home with Home health OT(no driving. Driving evaluation when medically able.)   Equipment Recommendations: Equipment Needed: Yes  Other: Recommend a shower chair.   Plan: Times per week: 90 minutes 5x/wk, 30 minutes 1x/wk  Current Treatment Recommendations: Functional Mobility Training, Endurance Training, Safety Education & Training, Self-Care / ADL, Balance Training, Cognitive Reorientation    Patient Education  Patient Education: ADL's, IADL's and Precautions    Goals  Short term goals  Time Frame for Short term goals: 1 week  Short term goal 1: Pt will complete BADL routine with S & 0-2 vcs for safety & sequencing  Short term goal 2: Pt will complete simple meal prep task with S & 0-2 vcs for safety  Short term goal 3: Pt will complete 8-10 min dynamic standing task with S for increased ease of returing to laundry tasks  Short term goal 4: Pt will complete IADL tasks with S & 0-2 vcs for walker safety  Long term goals  Time Frame for Long term goals : 2 weeks Long term goal 1: Pt will complete BADL with mod I & 0-1 vcs for safety  Long term goal 2: Pt will complete simple meal prep tasks with mod I & 0-1 vcs for safety    Following session, patient left in safe position with all fall risk precautions in place.

## 2020-12-30 NOTE — PROGRESS NOTES
Physical Medicine & Rehabilitation  Progress Note                       Jean-Claude Juan MD       Chief Complaint:  Rehab needs secondary to hypoxic encephalopathy     Subjective: The patient seen today on rounds. She was in her room waiting dinner. She is eager for discharge to home tomorrow. Family members are aware of her cognitive issues, and will be monitoring for issues   concerning her safety. Her daughter-in-law told our  that she believes her mother-in-law will not \"understand her deficits\" until she tries to go home and and develops a problem. She will be discharging to home with home health services including physical therapy, Occupational Therapy, nursing, home health aide, speech therapy, and . Patient has no new concerns today, and specifically denied headache, chest pain, shortness of breath, nausea, and vomiting.       Rehabilitation:  PT: Reviewed. OT: Reviewed.     ST: Reviewed.       Review of Systems - Negative except   History obtained from chart review  General ROS: Positive for confusion  Psychological ROS: Poor memory  Ophthalmic ROS:   ENT ROS:   Allergy and Immunology ROS:   Hematological and Lymphatic ROS:   Endocrine ROS:   Breast ROS:   Respiratory ROS:   Cardiovascular ROS:   Gastrointestinal ROS:   Genito-Urinary ROS:   Musculoskeletal ROS:   Neurological ROS: positive for - behavioral changes, confusion and memory loss  Dermatological ROS:              Objective:  Vitals:    12/30/20 0701   BP: (!) 140/62   Pulse: 79   Resp: 18   Temp: 97.9 °F (36.6 °C)   SpO2: 93%        awake  Mood: anxious and irritable  Affect: anxious, fearful and depressed  General appearance: Patient is well nourished, well developed     Memory:   abnormal - ,   Attention/Concentration: abnormal -   Language: abnormal - tangential     Cranial Nerves:  cranial nerves II-XII are grossly intact  ROM:  normal Motor Exam:  Motor exam is symmetrical 5 out of 5 all extremities bilaterally  Tone:  normal  Muscle bulk: within normal limits  Sensory:  Sensory intact  Coordination:   normal  Gait: normal     Heart: normal rate, regular rhythm, normal S1, S2, no murmurs, rubs, clicks or gallops  Lungs: clear to auscultation without wheezes or rales  Abdomen: soft, non-tender, non-distended, normal bowel sounds, no masses or organomegaly and bowel sounds normal     Skin: warm and dry, no rash or erythema  Peripheral vascular: Pulses: Normal upper and lower extremity pulses; Edema: no     Diagnostics:   Recent Results   No results found for this or any previous visit (from the past 24 hour(s)).    Impression:  1. Hypoxic encephalopathy  2. Chronic combined systolic and diastolic congestive heart failure  3. Bilateral pleural effusions  4. TAVR d/t severe aortic stenosis  5. Debility   6. Cardiac arrest   7. Acute respiratory failure requiring intubation   8. 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 Nonobstructive CAD  10. Impaired glucose tolerance - monitor glucose  11. Acute hyponatremia  12. Thrombocytopenia  13. Anemia, normocytic         Plan:  1. Continue home health therapies and current medications  2. Monitor for safety  3.  Plan discharge to home tomorrow, 12/31/2020     Izabela Rivera MD

## 2020-12-30 NOTE — PROGRESS NOTES
Physical Medicine & Rehabilitation  Progress Note                Gabby Murray MD      Chief Complaint:    Subjective: Following rehabilitation team conference, the patient was seen on rounds per myself and Mary Bateman from . Recommendations from team conference were discussed with the patient including team suggestion for 24/7 supervision at the time of discharge. The patient was adamant that she did not need that intervention, and explained that both of her daughters in law were unavailable. We discussed in great detail the effects of hypoxic encephalopathy which have infected which have involved her memory, problem solving, processing, and safety awareness. She responded with very vague and tangential comments but did end up saying she would do what she had to do although she \"just did not see it \". Pacifically she denied headache, chest pain, shortness of breath, nausea, and vomiting. She states that she has been sleeping at night and her bowels have been moving. She had problems with left chest wall pain over the weekend and was started on Voltaren gel which has been helping. Rehabilitation:  PT: Reviewed. OT: Reviewed. ST: Reviewed.       Review of Systems - Negative except   History obtained from chart review  General ROS: Positive for confusion  Psychological ROS: Poor memory  Ophthalmic ROS:   ENT ROS:   Allergy and Immunology ROS:   Hematological and Lymphatic ROS:   Endocrine ROS:   Breast ROS:   Respiratory ROS:   Cardiovascular ROS:   Gastrointestinal ROS:   Genito-Urinary ROS:   Musculoskeletal ROS:   Neurological ROS: positive for - behavioral changes, confusion and memory loss  Dermatological ROS:          Objective:  BP (!) 140/62   Pulse 79   Temp 97.9 °F (36.6 °C) (Oral)   Resp 18   Ht 5' 2\" (1.575 m)   Wt 123 lb (55.8 kg)   SpO2 93%   BMI 22.50 kg/m²     awake  Mood: anxious and irritable  Affect: anxious, fearful and depressed General appearance: Patient is well nourished, well developed    Memory:   abnormal - ,   Attention/Concentration: abnormal -   Language: abnormal - tangential    Cranial Nerves:  cranial nerves II-XII are grossly intact  ROM:  normal  Motor Exam:  Motor exam is symmetrical 5 out of 5 all extremities bilaterally  Tone:  normal  Muscle bulk: within normal limits  Sensory:  Sensory intact  Coordination:   normal  Gait: normal    Heart: normal rate, regular rhythm, normal S1, S2, no murmurs, rubs, clicks or gallops  Lungs: clear to auscultation without wheezes or rales  Abdomen: soft, non-tender, non-distended, normal bowel sounds, no masses or organomegaly and bowel sounds normal    Skin: warm and dry, no rash or erythema  Peripheral vascular: Pulses: Normal upper and lower extremity pulses; Edema: no    Diagnostics:   No results found for this or any previous visit (from the past 24 hour(s)). Impression:  1. Hypoxic encephalopathy  2. Chronic combined systolic and diastolic congestive heart failure  3. Bilateral pleural effusions  4. TAVR d/t severe aortic stenosis  5. Debility   6. Cardiac arrest   7. Acute respiratory failure requiring intubation   8. 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 Nonobstructive CAD  10. Impaired glucose tolerance - monitor glucose  11. Acute hyponatremia  12. Thrombocytopenia  13. Anemia, normocytic        Plan:  1. Continue therapies, current medications, monitor for safety  2. We will speak with daughters in law regarding discharge planning    I have reviewed the individualized overall plan of care developed by each specialty and agree with the treatment approach at this time. Please reference the history and physical along with interdisciplinary team conference notes from this admission for full details concerning the estimated LOS, intensity of services, and discussion of medical comorbid conditions.        Kristopher Briceno MD

## 2020-12-30 NOTE — PLAN OF CARE
Care plan reviewed with patient and  verbalize understanding of the plan of care and contribute to goal setting. Problem: Discharge Planning:  Goal: Patients continuum of care needs are met  Description: Patients continuum of care needs are met  Outcome: Met This Shift  Note: Needs met     Problem: Bleeding:  Goal: Will show no signs and symptoms of excessive bleeding  Description: Will show no signs and symptoms of excessive bleeding  Outcome: Met This Shift  Note: No s/s     Problem: Pain:  Description: Pain management should include both nonpharmacologic and pharmacologic interventions.   Goal: Pain level will decrease  Description: Pain level will decrease  Outcome: Met This Shift  Note: With meds     Problem: Falls - Risk of:  Goal: Will remain free from falls  Description: Will remain free from falls  Outcome: Met This Shift  Note: Uses call light and steady on feet

## 2020-12-31 ENCOUNTER — CLINICAL DOCUMENTATION (OUTPATIENT)
Dept: PHYSICAL MEDICINE AND REHAB | Age: 85
End: 2020-12-31

## 2020-12-31 VITALS
TEMPERATURE: 98.1 F | SYSTOLIC BLOOD PRESSURE: 150 MMHG | HEART RATE: 74 BPM | BODY MASS INDEX: 22.63 KG/M2 | OXYGEN SATURATION: 96 % | RESPIRATION RATE: 16 BRPM | DIASTOLIC BLOOD PRESSURE: 75 MMHG | HEIGHT: 62 IN | WEIGHT: 123 LBS

## 2020-12-31 PROCEDURE — 99239 HOSP IP/OBS DSCHRG MGMT >30: CPT | Performed by: PHYSICAL MEDICINE & REHABILITATION

## 2020-12-31 PROCEDURE — 97530 THERAPEUTIC ACTIVITIES: CPT

## 2020-12-31 PROCEDURE — 6370000000 HC RX 637 (ALT 250 FOR IP): Performed by: PHYSICIAN ASSISTANT

## 2020-12-31 PROCEDURE — 97535 SELF CARE MNGMENT TRAINING: CPT

## 2020-12-31 PROCEDURE — 97129 THER IVNTJ 1ST 15 MIN: CPT | Performed by: SPEECH-LANGUAGE PATHOLOGIST

## 2020-12-31 PROCEDURE — 6370000000 HC RX 637 (ALT 250 FOR IP): Performed by: FAMILY MEDICINE

## 2020-12-31 PROCEDURE — 97130 THER IVNTJ EA ADDL 15 MIN: CPT | Performed by: SPEECH-LANGUAGE PATHOLOGIST

## 2020-12-31 PROCEDURE — 6360000002 HC RX W HCPCS: Performed by: PHYSICIAN ASSISTANT

## 2020-12-31 RX ADMIN — FUROSEMIDE 40 MG: 40 TABLET ORAL at 07:57

## 2020-12-31 RX ADMIN — ASPIRIN 81 MG: 81 TABLET, COATED ORAL at 07:56

## 2020-12-31 RX ADMIN — DICYCLOMINE HYDROCHLORIDE 10 MG: 10 CAPSULE ORAL at 07:57

## 2020-12-31 RX ADMIN — CLOPIDOGREL BISULFATE 75 MG: 75 TABLET ORAL at 07:56

## 2020-12-31 RX ADMIN — POTASSIUM CHLORIDE 20 MEQ: 1500 TABLET, EXTENDED RELEASE ORAL at 07:56

## 2020-12-31 RX ADMIN — MULTIPLE VITAMINS W/ MINERALS TAB 1 TABLET: TAB at 07:56

## 2020-12-31 RX ADMIN — METOPROLOL TARTRATE 25 MG: 25 TABLET ORAL at 07:56

## 2020-12-31 RX ADMIN — DICLOFENAC 4 G: 10 GEL TOPICAL at 07:56

## 2020-12-31 RX ADMIN — FAMOTIDINE 20 MG: 20 TABLET, FILM COATED ORAL at 07:56

## 2020-12-31 RX ADMIN — ENOXAPARIN SODIUM 40 MG: 40 INJECTION SUBCUTANEOUS at 07:56

## 2020-12-31 ASSESSMENT — PAIN SCALES - GENERAL: PAINLEVEL_OUTOF10: 0

## 2020-12-31 NOTE — PROGRESS NOTES
[address] [address]  Recreational Therapy  Discharge Note  Inpatient Rehabilitation Unit           Date:  12/31/2020       Patient Name: Mari Corral      MRN: [id number]       YOB: 1931 (80 y.o.)       Gender: female  Diagnosis: hypoxic encephalopathy  Referring Practitioner: Dr. Feliberto Bender    Patient discharged from Recreational Therapy at this time. See recreational therapy notes for details.     Electronically signed by: YULISA Roberts  Date: 12/31/2020

## 2020-12-31 NOTE — PROGRESS NOTES
[facility]  INPATIENT PHYSICAL THERAPY  DISCHARGE NOTE  [address]    Time In: 1030  Time Out: 1053  Timed Code Treatment Minutes: 23 Minutes  Minutes: 23     Date: 2020  Patient Name: Yasmin Lazo,  Gender:  female        MRN: [id number]  : 1931  (80 y.o.)     Referring Practitioner: Jerica Spann MD  Diagnosis: hypoxic encephalopathy  Additional Pertinent Hx: Per MD note: 80 y.o. female admitted to the inpatient rehabilitation unit on 2020. She was admitted to [facility] on 2020. Patient presented to hospital for outpatient heart cath with Dr Rema Daugherty. Patient had proceeded up to bathroom after cath and complained of CP and dizziness. Patient became unresponsive and asystole, CPR for 2 minutes given and patient responded, but with O2 sat 74% with 100% NRB, patient was intubated and taking to ICU. Patient was taken for TAVR on 2020. Pt to IPR: 2020. Prior Level of Function:  Lives With: Alone  Type of Home: House  Home Layout: One level  Home Access: (typically goes through the garage- no hand rail, can enter through the front door with newly installed hand rail on the left)  Entrance Stairs - Number of Steps: 2-3  Home Equipment: Reacher   Bathroom Shower/Tub: Walk-in shower  Bathroom Toilet: Standard(has 1 STS & 1 ETS)    ADL Assistance: [address] [place] [address] Avenue: Independent  Ambulation Assistance: Independent  Transfer Assistance: Independent  Active : Yes  Additional Comments: No AD at home, fully indep PLOF. Restrictions/Precautions:  Restrictions/Precautions: Fall Risk  Position Activity Restriction  Other position/activity restrictions: s/p TAVR on 2020. Monitor pulse rate with activity. Keep <= 20 BPM increase over baseline     SUBJECTIVE: pt in chair with step daughter present, agrees to therapy session.  No concerns voiced regarding return to home    PAIN: 0/10: Short term goal 3: Pt will ambulate with least restrictive device and  feet to walk safely in the home and community distances MET, see LTG  Short term goal 4: ascend/descend 2-3 steps with SBA to enter home MET, see LTG  Long term goals  Time Frame for Long term goals : 3 weeks  Long term goal 1: supine to sit and return with I to get in and out of bed - GOAL MET  Long term goal 2: sit to stand with I to get on and off various surfaces - GOAL MET  Long term goal 3: Pt will ambulate without devices, showing independence with walking program, using various techniques to determine walking distance or time, for home walking program. -  NOT MET  Long term goal 4: Pt to ascend/descend 4 steps with Rt rail, pausing after ea step, Mod I for home entry -  NOT MET  Long term goal 5: Pt to be independent with home walking program to include warm up and cool down ex for improved endurance. - NOT MET  Long term goal 6: Pt to get in/out of car, Mod I, for transportation needs. - GOAL MET    Following session, patient left in safe position with all fall risk precautions in place.

## 2020-12-31 NOTE — PROGRESS NOTES
Pt awake at 0730 and voices was awake a lot last night due to eagerness to go home. Refused am bath. Skin clear dry and intact. Denies any new  Concerns.

## 2020-12-31 NOTE — PROGRESS NOTES
Reviewed all discharge information as on discharge form with pt. And family. PT verbalized understanding.

## 2020-12-31 NOTE — PROGRESS NOTES
[address] [place] THERAP[facility]  [address]  DISCHARGE NOTE     TIME   SLP Individual Minutes  Time In: 1000  Time Out: 1030  Minutes: 30  Timed Code Treatment Minutes: 30 Minutes      Cognitive Tx: 30 minutes     Date: 2020  Patient Name: Crispin Vines      CSN: [id number]   : 1931  (80 y.o.)  Gender: female   Referring Physician:  Joao Amaral MD  Diagnosis: hypoxic encephalopathy   Secondary Diagnosis: cognitive deficits   Precautions: fall risk  Current Diet: Regular solids with thin liquids   Swallowing Strategies: Standard Universal Swallow Precautions  Date of Last MBS: Not Applicable    Pain:  No pain reported. Subjective: Patient sitting upright in chair for session, pleasant and cooperative. Step daughter, Shirley Mccracken present for family education, which was well received. FAMILY EDUCATION:  -Provided review of rehab diagnosis (hypoxic encephalopathy), as well as associated impairments and level of severity. -Reviewed POC at length, including individual goals and areas of progress/continued impairment, highlighting areas of concern in which supervision from family is recommended. Patient and step daughter expressing understanding.   -Reviewed suggestions/compensatory strategies from home including:   -- Use of a calendar to keep track of appointments/events, use of a notepad for for writing lists/making notations  --Use of compensatory strategies - Write it, Repeat it, Associate it, Picture it  --Keeping a list of emergency contacts near the phone  --Supervision with filling out paperwork and completing finances. --Use of a pill box to assist with managing medications- Also with supervision from family   --Refraining from driving until medically cleared by physician. Recommended completion of a driving simulator assessment.      Short-Term Goals:  SHORT TERM GOAL #1: Goal 1: Pt will complete functional immediate/delayed recall and working memory tasks (with focus on compensatory strategies) with 75% accuracy, mod cues to improve retention of pertinent information. GOAL MET  INTERVENTIONS: Did not address due to completion of formal family education. PREVIOUS SESSION:    Recall of tasks completed in prior ST session- 2/2 for specific tasks with provision of additional details. Reviewed STM strategies using the acronym WRAP--Write it down, Repeat it, Associate it, and Pictures it. Patient able to recall 2/4 strategies independently, 2/4 additional with multiple choice cues. SHORT TERM GOAL #2:  Goal 2: Pt will complete higher level problem solving and verbal/visual reasoning tasks with 80% accuracy, min cues to assist with independence for ADL contribution. GOAL MET  INTERVENTIONS:Did not address due to completion of formal family education. PREVIOUS SESSION:    Reasoning related to information on a prescription label- 7/10 indep, 2/10 with min cues, 1/10 with mod cues. *Cuing needed to reference information listed on the label to clarify instructions, as well as for providing how she would re-fill prescriptions within her own process. SHORT TERM GOAL #3:  Goal 3: Pt will complete moderately complex thought organization and executive functioning tasks (time, money/math/finances, medications, home-related) with 80% accuracy, min cues to maximize IADL completion per PLOF. GOAL NOT MET  INTERVENTIONS:Did not address due to completion of formal family education. PREVIOUS SESSION:   Calendar organization task- Patient given a blank calendar for January 2021 with a list of 3 appointments that she has scheduled. Patient prompted to fill in the appointments on her schedule as she would do at home.   -Increased time needed to complete task, with patient attempting to figure in driving time and adjusting times incorrectly . -3/6 indep, 3/6 with mod verbal cues to add missing details (ie: name of physician, type of procedure), as well as correcting mis information (patient writing \"2:15\" for a \"2:00\" appointment, stating \"I added driving time. \" No awareness that the driving time would need to come prior to the session and not after). Time word problems: 4/9 indep, 2/9 with min cues, 3/9 with mod cues. *Patient reporting not understanding \"functional math problems,\" and stating she wouldn't \"do it that way. \"   *Min cues needed for task analysis, but with appropriate math computation  *Mod cues needed for task analysis, problem set up, and math computation. SHORT TERM GOAL #4:  Goal 4: Pt will complete higher level comprehension (written/auditory) tasks with 80% accuracy, mod cues to assist with understanding of multi-faceted information relevant to daily living scenarios. GOAL MET  INTERVENTIONS: Refer to goal #2    SHORT TERM GOAL #5:  Goal 5: Pt will complete sustained, selective, and divided attention tasks with no more than 3 re-directions/errors until task completion to improve mental focus and ability to return to driving and household obligations. GOAL MET  INTERVENTIONS:Did not address due to completion of formal family education. PREVIOUS SESSION:    Divided attention task: patient prompted to complete a double target visual scanning task while simultaneously listening for an auditory target. Patient sustained attention to task for 4 minutes 29 seconds with x2 errors on the visual scanning portion and x1 error with identifying auditory targets. Overall good success with task this session. Long-Term Goals:  Timeframe for Long-term Goals: 4 weeks    LONG TERM GOAL #1:  Goal 1: Pt will improve cognitive-linguistic skilled to a mod-I level of functioning or better to safely permit return to home and previous responisibilities at discharge.  GOAL NOT MET Comprehension: 5 - Patient understands basic needs (hungry/hot/pain)  Expression: 6 - Device used to express complex ideas/needs  Social Interaction: 6 - Patient requires medication for mood and/or effect  Problem Solvin - Patient solves simple/routine tasks 75-90%+   Memory: 4 - Patient remembers 75-90%+ of the time     EDUCATION:  Learner: Patient and Step daughter Felecia Marie  Education:  Reviewed ST goals and Plan of Care, Reviewed recommendations for follow-up, Home Safety Education and Suggestions for compensatory strategies to be used in the home environment  Evaluation of Education: Verbalizes understanding    ASSESSMENT/PLAN:  SUMMARY: Patient met 4/5 short term goals and 0/1 long term goals over the course of her rehab stay. Pt continues to present with mild-moderate cognitive-linguistic impairments characterized by deficits with immediate/delayed recall, working memory, complex reasoning, thought organization, reading comprehension, attention and problem solving. Limited overall insight to cognitive-linguistic deficits persists. Pt was independent with all ADLs and IADLs PTA, including medication and financial management as well as actively driving. Reviewed areas of deficits as well as functional implications with patient and step daughter, with recommendations that patient have supervision with medication and finance management. Continued  ST interventions are HIGHLY warranted to address the above cognitive-linguistic impairments to permit potential safe return to baseline responsibilities at a modified independent level. Activity Tolerance:  Patient tolerance of  treatment: good. Assessment/Plan: Patient discharged from Speech Therapy at this time due to completion of inpatient rehab stay. Discharge Disposition: Home with assistance from family . Continued Speech Therapy Services recommended: YES- [facility] Mitchell, [address], [address]

## 2020-12-31 NOTE — PROGRESS NOTES
[facility]  Inpatient Rehabilitation  Occupational Therapy  Discharge Note  Time:  Time In: 930  Time Out: 1000  Timed Code Treatment Minutes: 30 Minutes  Minutes: 30    Date: 2020  Patient Name: Mihaela Benson,   Gender: female      Room: 7E-54/054-A  MRN: [id number]  : 1931  (80 y.o.)  Referring Practitioner: Dr. Jennifer Scherer  Diagnosis: hypoxic encephalopathy  Additional Pertinent Hx: Per MD note: 80 y.o. female admitted to the inpatient rehabilitation unit on 2020. She was admitted to [facility] on 2020. Patient presented to hospital for outpatient heart cath with Dr Krisite Brannon. Patient had proceeded up to bathroom after cath and complained of CP and dizziness. Patient became unresponsive and asystole, CPR for 2 minutes given and patient responded, but with O2 sat 74% with 100% NRB, patient was intubated and taking to ICU. Patient was taken for TAVR on 2020. Restrictions/Precautions:  Restrictions/Precautions: Fall Risk  Position Activity Restriction  Other position/activity restrictions: s/p TAVR on 2020. Monitor pulse rate with activity. Keep <= 20 BPM increase over baseline    SUBJECTIVE: Pt pleasant and cooperative. Agreeable to OT. Reports excitement to go home today. Family present for education, supportive. PAIN: Denies pain/    COGNITION: Decreased Recall and Decreased Insight    ADL:   No ADL's completed this session. But discussed in detail recommendation for shower chair. Pt declines need for home, although according to chart review pt required one for balance safety for feet. Pt and daughter reporting they obtained a grab bar and they feel like that will be sufficient. Reviewed other options of keeping a chair on outside of shower as well for drying off for optimal safety. Pt in agreement for this. BALANCE:  Sitting Balance:  Independent. Standing Balance: Independent.       BED MOBILITY:  Not Tested    TRANSFERS: Sit to Stand:  Independent. recliner, couch  Stand to Sit: Independent. FUNCTIONAL MOBILITY:  Assistive Device: None  Assist Level: Independent. Distance: To and from therapy apartment  No LOB      ADDITIONAL ACTIVITIES:  As a part of family education, JOHNSON fabricated unsafe and hazardous situations in kitchen this date to facilitate kitchen safety awareness and problem solving skills needed for IADLs; situations were graded ranging from obvious hazards to more discrete to challenge safety awareness. Pt was able to accurately identify 7/7 hazards but required mod cue'ing to explain in detail why it's dangerous and the consequences of them. ASSESSMENT:  Activity Tolerance:  Patient tolerance of  treatment: good. Assessment: Assessment: Joanne Flores has made steady gains on IP Rehab. She has progressed to a MI level with her rote BADL tasks and is MI with functinoal transfers and basic mobility. She continues require min VC'ing for functional cognition during her IADL routines, especially in regards to thought organization, attention, recall and insight and family has been educated on how to A with this at home to promote safety and independence. SHe would cont to benefit from skilled OT intervention on a HH basis to improve overall safety at home to decrease risk of falls and future injuries  Discharge Recommendations: Continue to assess pending progress, 24 hour supervision or assist, Home with nursing aide, Home with Home health OT(no driving. Driving evaluation when medically able.)  Equipment Recommendations: Equipment Needed: Yes  Other: Recommend a shower chair. Family aware of recommendation and purchasing options.   Plan: [facility]charge [facility] supportive [facility]   Patient Education  Patient Education: ADL's, IADL's, Family Education, Equipment Education, Reviewed Prior Education and Home Safety    Goals  Short term goals  Time Frame for Short term goals: 1 week Short term goal 1: Pt will complete BADL routine with S & 0-2 vcs for safety & sequencing GOAL MET   Short term goal 2: Pt will complete simple meal prep task with S & 0-2 vcs for safety GOAL MET   Short term goal 3: Pt will complete 8-10 min dynamic standing task with S for increased ease of returing to laundry tasks GOAL MET   Short term goal 4: Pt will complete IADL tasks with S & 0-2 vcs for walker safety GOAL MET   Long term goals   Time Frame for Long term goals : 2 weeks  Long term goal 1: Pt will complete BADL with mod I & 0-1 vcs for safety GOAL MET   Long term goal 2: Pt will complete simple meal prep tasks with mod I & 0-1 vcs for safety GOAL NOT MET     Following session, patient left in safe position with all fall risk precautions in place.

## 2020-12-31 NOTE — PLAN OF CARE
Care plan reviewed with patient and  verbalize understanding of the plan of care and contribute to goal setting.

## 2020-12-31 NOTE — DISCHARGE SUMMARY
Physical Medicine & Rehabilitation   Discharge Summary     Patient Identification:  Mihaela Benson  : 1931  Admit date: 2020  Discharge date: 2020  Attending provider: No att. 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 Pt with hypoxic encephalopathy. She demonstrates a decrease in baseline by way of transfers, bedm obility and ambulation secondary to the aforementioned deficits. She will benefit from skilled PT services during admission and post d/c improved functional I and safety with mobility. Occupational therapy:  ,  , Assessment: Renetta Menezes has made steady gains on IP Rehab. She has progressed to a MI level with her rote BADL tasks and is MI with functinoal transfers and basic mobility. She continues require min VC'ing for functional cognition during her IADL routines, especially in regards to thought organization, attention, recall and insight and family has been educated on how to A with this at home to promote safety and independence. SHe would cont to benefit from skilled OT intervention on a HH basis to improve overall safety at home to decrease risk of falls and future injuries    Speech therapy:  Comprehension: 5 - Patient understands basic needs (hungry/hot/pain)  Expression: 6 - Device used to express complex ideas/needs  Social Interaction: 6 - Patient requires medication for mood and/or effect  Problem Solvin - Patient solves simple/routine tasks 75-90%+   Memory: 4 - Patient remembers 75-90%+ of the time      Inpatient Rehabilitation Course:   Keyonna Deluca is a 80 y.o. female admitted to inpatient rehabilitation on 2020 for therapies to improve her mobility, ADLs, and cognitive skills. .  The patient participated in an aggressive multidisciplinary inpatient rehabilitation program involving 3 hours per day, 5 days per week of rehabilitation. Diabetic management was maximized with diet and medication options to attempt to achieve blood sugar control between . Hypertension management was undertaken with medication management on any patient with systolic blood pressure greater than 780 or diastolic blood pressure greater than 90. Hyperlipidemia was considered and the patient was started or continued on a statin medication for any LDL>100. Appropriate stroke prophylaxis was maintained throughout rehabilitation stay. Appropriate DVT prophylaxis options were considered throughout rehabilitation stay.     Consults:   Family Medicine and Dietary No results for input(s): POCGLU in the last 72 hours. Cholesterol Panel:   No results found for requested labs within last 30 days. Patient Instructions: Follow-up visits: See after visit summary from hospitalization    Discharge Medications:   Tacos Aranda   Home Medication Instructions NCD:[id number]    Printed on:12/31/20 1231   Medication Information                      acetaminophen (TYLENOL) 325 MG tablet  Take 1 tablet by mouth every 6 hours as needed for Pain             aspirin 81 MG EC tablet  Take 81 mg by mouth daily             clopidogrel (PLAVIX) 75 MG tablet  Take 1 tablet by mouth daily             diclofenac sodium (VOLTAREN) 1 % GEL  Apply 2 g topically 3 times daily as needed for Pain (left chest wall)             dicyclomine (BENTYL) 10 MG capsule  Take 1 capsule by mouth 2 times daily             Ferrous Sulfate (IRON PO)  Take 1 tablet by mouth daily             furosemide (LASIX) 40 MG tablet  Take 1 tablet by mouth daily             loratadine (CLARITIN) 10 MG tablet  Take 10 mg by mouth daily. lovastatin (MEVACOR) 10 MG tablet  Take 1 tablet by mouth nightly. metoprolol tartrate (LOPRESSOR) 25 MG tablet  Take 1 tablet by mouth 2 times daily             Multiple Vitamins-Minerals (MULTI VITAMIN/MINERALS) TABS  Take  by mouth.                Multiple Vitamins-Minerals (OCUVITE PO)  Take 1 tablet by mouth daily             Omega-3 Fatty Acids (FISH OIL OMEGA-3 PO)  Take by mouth             potassium chloride (KLOR-CON M) 20 MEQ extended release tablet  Take 1 tablet by mouth daily             vitamin D (ERGOCALCIFEROL) 02028 units CAPS capsule  Take 50,000 Units by mouth once a week             ZINC PO  Take 1 tablet by mouth daily               Condition at discharge good    Disposition Home with family Controlled substances monitoring: possible medication side effects, risk of tolerance and/or dependence, and alternative treatments discussed and not applicable.      40 minutes spent preparing the patient for discharge    Kevin Reis MD

## 2020-12-31 NOTE — PROGRESS NOTES
Plan remains for discharge home today, 12/31/2020. SW contacted [facility] with discharge notification of today, 12/31/2020. Information provided to [facility]. Agency has access to discharge instructions and face to face encounter.

## 2021-01-07 ENCOUNTER — OFFICE VISIT (OUTPATIENT)
Dept: CARDIOLOGY CLINIC | Age: 86
End: 2021-01-07
Payer: MEDICARE

## 2021-01-07 ENCOUNTER — HOSPITAL ENCOUNTER (OUTPATIENT)
Age: 86
Discharge: HOME | End: 2021-01-07
Payer: MEDICARE

## 2021-01-07 VITALS
DIASTOLIC BLOOD PRESSURE: 55 MMHG | SYSTOLIC BLOOD PRESSURE: 132 MMHG | HEART RATE: 67 BPM | WEIGHT: 120.6 LBS | HEIGHT: 60 IN | BODY MASS INDEX: 23.68 KG/M2

## 2021-01-07 DIAGNOSIS — I50.42 CHRONIC COMBINED SYSTOLIC AND DIASTOLIC CONGESTIVE HEART FAILURE (HCC): ICD-10-CM

## 2021-01-07 DIAGNOSIS — I42.8 CARDIOMYOPATHY, NONISCHEMIC (HCC): ICD-10-CM

## 2021-01-07 DIAGNOSIS — I10 ESSENTIAL HYPERTENSION: ICD-10-CM

## 2021-01-07 DIAGNOSIS — E78.00 PURE HYPERCHOLESTEROLEMIA: ICD-10-CM

## 2021-01-07 DIAGNOSIS — Z95.2 S/P TAVR (TRANSCATHETER AORTIC VALVE REPLACEMENT): ICD-10-CM

## 2021-01-07 DIAGNOSIS — Z98.890 S/P CARDIAC CATH: ICD-10-CM

## 2021-01-07 DIAGNOSIS — I50.42 CHRONIC COMBINED SYSTOLIC AND DIASTOLIC CONGESTIVE HEART FAILURE (HCC): Primary | ICD-10-CM

## 2021-01-07 PROBLEM — R07.9 CHEST PAIN ON EXERTION: Status: RESOLVED | Noted: 2020-11-30 | Resolved: 2021-01-07

## 2021-01-07 LAB
ANION GAP SERPL CALCULATED.3IONS-SCNC: 10 MEQ/L (ref 8–16)
BUN BLDV-MCNC: 21 MG/DL (ref 7–22)
CALCIUM SERPL-MCNC: 10.6 MG/DL (ref 8.5–10.5)
CHLORIDE BLD-SCNC: 99 MEQ/L (ref 98–111)
CO2: 27 MEQ/L (ref 23–33)
CREAT SERPL-MCNC: 1.2 MG/DL (ref 0.4–1.2)
GFR SERPL CREATININE-BSD FRML MDRD: 42 ML/MIN/1.73M2
GLUCOSE BLD-MCNC: 94 MG/DL (ref 70–108)
MAGNESIUM: 2 MG/DL (ref 1.6–2.4)
POTASSIUM SERPL-SCNC: 4.6 MEQ/L (ref 3.5–5.2)
SODIUM BLD-SCNC: 136 MEQ/L (ref 135–145)

## 2021-01-07 PROCEDURE — 36415 COLL VENOUS BLD VENIPUNCTURE: CPT

## 2021-01-07 PROCEDURE — 83735 ASSAY OF MAGNESIUM: CPT

## 2021-01-07 PROCEDURE — 99214 OFFICE O/P EST MOD 30 MIN: CPT | Performed by: INTERNAL MEDICINE

## 2021-01-07 PROCEDURE — 80048 BASIC METABOLIC PNL TOTAL CA: CPT

## 2021-01-07 RX ORDER — LISINOPRIL 5 MG/1
5 TABLET ORAL DAILY
Qty: 30 TABLET | Refills: 5 | Status: SHIPPED | OUTPATIENT
Start: 2021-01-07 | End: 2021-01-27 | Stop reason: SDUPTHER

## 2021-01-07 NOTE — PROGRESS NOTES
Chief Complaint   Patient presents with    1 Month Follow-Up    Check-Up           Pt here for a 1 month f/u    After TAVR  NO more chest pain  Sob on exertion much better  No more dizziness    Feel better    Can walk and  talk well for 5 min  Some leg edema better   no more agile    EKG done 12/18/20    Never smoked    Known to have murmur for yrs    [address]  None for CAD      Patient Active Problem List   Diagnosis    HTN (hypertension)    Hyperlipidemia    Osteoporosis    Allergic rhinitis    IBS (irritable bowel syndrome)    Osteoarthrosis involving multiple sites    Medication monitoring encounter    Postmenopausal bone loss    Anemia    Right hand weakness, atrophy of thumb muscle.  SOB (shortness of breath) on exertion    Chronic congestive heart failure (HCC)    Systolic ejection murmur 5/6 best aortic area    Pansystolic murmur- best tricuspid area 5/6    Severe aortic stenosis    Cardiomyopathy (HCC)    S/P CATH - LHC AND RHC- LM-OSTAIL 10% STENOSIS, LAD-PATENT, LCX-P, RCA-P, ; RHC PAP 55/26 MEAN 38 MMHG,PCWP 26 MMHG,- TO BE SCHEDULED FOR TAVR  ASAP WITH DR. CARTAGENA    S/P right and left heart catheterization    Cardiac arrest ([facility] [address]    Moderate malnutrition ([address]    Hypoxic encephalopathy (HCC)    S/P TAVR (transcatheter aortic valve replacement)    Cardiomyopathy, nonischemic ([address]       Past Surgical History:   Procedure Laterality Date    CARPAL TUNNEL RELEASE Right     ELBOW SURGERY      2013 right elbow    TONSILLECTOMY         Allergies   Allergen Reactions    Tomato Hives        History reviewed. No pertinent family history. Social History     Socioeconomic History    Marital status:       Spouse name: Not on file    Number of children: Not on file    Years of education: Not on file    Highest education level: Not on file   Occupational History    Not on file   Social Needs    Financial resource strain: Not on file    Food insecurity     Worry: Not on file Inability: Not on file    Transportation needs     Medical: Not on file     Non-medical: Not on file   Tobacco Use    Smoking status: Never Smoker    Smokeless tobacco: Never Used   Substance and Sexual Activity    Alcohol use: No     Comment: rare    Drug use: No    Sexual activity: Not on file   Lifestyle    Physical activity     Days per week: Not on file     Minutes per session: Not on file    Stress: Not on file   Relationships    Social connections     Talks on phone: Not on file     Gets together: Not on file     Attends Spiritism service: Not on file     Active member of club or organization: Not on file     Attends meetings of clubs or organizations: Not on file     Relationship status: Not on file    Intimate partner violence     Fear of current or ex partner: Not on file     Emotionally abused: Not on file     Physically abused: Not on file     Forced sexual activity: Not on file   Other Topics Concern    Not on file   Social History Narrative    Not on file       Current Outpatient Medications   Medication Sig Dispense Refill    lisinopril (PRINIVIL;ZESTRIL) 5 MG tablet Take 1 tablet by mouth daily 30 tablet 5    acetaminophen (TYLENOL) 325 MG tablet Take 1 tablet by mouth every 6 hours as needed for Pain      metoprolol tartrate (LOPRESSOR) 25 MG tablet Take 1 tablet by mouth 2 times daily 60 tablet 1    furosemide (LASIX) 40 MG tablet Take 1 tablet by mouth daily 30 tablet 1    diclofenac sodium (VOLTAREN) 1 % GEL Apply 2 g topically 3 times daily as needed for Pain (left chest wall) 100 g 0    potassium chloride (KLOR-CON M) 20 MEQ extended release tablet Take 1 tablet by mouth daily 30 tablet 1    clopidogrel (PLAVIX) 75 MG tablet Take 1 tablet by mouth daily 30 tablet 1    dicyclomine (BENTYL) 10 MG capsule Take 1 capsule by mouth 2 times daily      Multiple Vitamins-Minerals (OCUVITE PO) Take 1 tablet by mouth daily      Ferrous Sulfate (IRON PO) Take 1 tablet by mouth daily  aspirin 81 MG EC tablet Take 81 mg by mouth daily      ZINC PO Take 1 tablet by mouth daily      Omega-3 Fatty Acids (FISH OIL OMEGA-3 PO) Take by mouth      vitamin D (ERGOCALCIFEROL) 22192 units CAPS capsule Take 50,000 Units by mouth once a week      Multiple Vitamins-Minerals (MULTI VITAMIN/MINERALS) TABS Take  by mouth.  lovastatin (MEVACOR) 10 MG tablet Take 1 tablet by mouth nightly. 90 tablet 3    loratadine (CLARITIN) 10 MG tablet Take 10 mg by mouth daily. No current facility-administered medications for this visit. Review of Systems -     General ROS: negative  Psychological ROS: negative  Hematological and Lymphatic ROS: No history of blood clots or bleeding disorder. Respiratory ROS: no cough,  or wheezing, the rest see HPI  Cardiovascular ROS: See HPI  Gastrointestinal ROS: negative  Genito-Urinary ROS: no dysuria, trouble voiding, or hematuria  Musculoskeletal ROS: negative  Neurological ROS: no TIA or stroke symptoms  Dermatological ROS: negative      Blood pressure (!) 132/55, pulse 67, height 5' (1.524 m), weight 120 lb 9.6 oz (54.7 kg).         Physical Examination:    General appearance - alert, well appearing, and in no distress  HEENT- Pink conjunctiva  , Non-icteri sclera,PERRLA  Mental status - alert, oriented to person, place, and time  Neck - supple, no significant adenopathy, no JVD, or carotid bruits  Chest - clear to auscultation, no wheezes, rales or rhonchi, symmetric air entry  Heart - normal rate, regular rhythm, normal S1, S2, no murmurs, rubs, clicks or gallops  Abdomen - soft, nontender, nondistended, no masses or organomegaly  HODAN- no CVA or flank tenderness, no suprapubic tenderness  Neurological - alert, oriented, normal speech, no focal findings or movement disorder noted  Musculoskeletal/limbs - no joint tenderness, deformity or swelling   - peripheral pulses normal, no pedal edema, no clubbing or cyanosis  Skin - normal coloration and turgor, no rashes, no suspicious skin lesions noted  Psych- appropriate mood and affect    Lab  No results for input(s): CKTOTAL, CKMB, CKMBINDEX, TROPONINI in the last 72 hours. CBC:   Lab Results   Component Value Date    WBC 6.4 12/25/2020    RBC 3.23 12/25/2020    HGB 10.0 12/25/2020    HCT 31.6 12/25/2020    MCV 97.8 12/25/2020    MCH 31.0 12/25/2020    MCHC 31.6 12/25/2020    RDW 13.0 01/17/2013     12/25/2020    MPV 11.4 12/25/2020     BMP:    Lab Results   Component Value Date     01/07/2021    K 4.6 01/07/2021    K 4.9 12/25/2020    CL 99 01/07/2021    CO2 27 01/07/2021    BUN 21 01/07/2021    LABALBU 3.6 12/25/2020    CREATININE 1.2 01/07/2021    CALCIUM 10.6 01/07/2021    LABGLOM 42 01/07/2021    GLUCOSE 94 01/07/2021    GLUCOSE 93 12/15/2020     Hepatic Function Panel:    Lab Results   Component Value Date    ALKPHOS 52 12/25/2020    ALT 18 12/25/2020    AST 26 12/25/2020    PROT 5.2 12/25/2020    BILITOT 0.2 12/25/2020    BILIDIR <0.2 12/18/2020    LABALBU 3.6 12/25/2020     Magnesium:    Lab Results   Component Value Date    MG 2.0 01/07/2021     Warfarin PT/INR:  No components found for: PTPATWAR, PTINRWAR  HgBA1c:  No results found for: LABA1C  FLP:    Lab Results   Component Value Date    TRIG 56 01/17/2013    HDL 91 01/17/2013    HDL 68 01/11/2012    LDLCALC 106 01/17/2013     TSH:    Lab Results   Component Value Date    TSH 2.210 12/19/2020       ekg  Normal 11/24/2020sinus rhythm  Possible Left atrial enlargement  LVH  Nonspecific T wave abnormality  Abnormal ECG  When compared with ECG of 24-NOV-2020 12:12,  No significant change was found     Conclusions      Summary   Left ventricle size is normal.   Mildly increased left ventricle wall thickness. Mild concentric left ventricular hypertrophy. There was moderate global hypokinesis of the left ventricle. Systolic function was moderately reduced. Ejection fraction is visually estimated in the range of 40% to 45%.    Doppler parameters were consistent with abnormal left ventricular   relaxation (grade 1 diastolic dysfunction). The left atrium is Mildly dilated. There is severe/critical/ aortic stenosis with valve area of 0.5 to 0.6 sq   cm. The maximum aortic valve gradient is 125 mmHg, the mean gradient is 84   mmHg, and the peak velocity is 5.6 m/s. Signature    ----------------------------------------------------------------   Electronically signed by Alex Bland MD (Interpreting   physician) on 12/02/2020 at 05:08 PM    Conclusions      Summary   Left ventricle size is normal.   Mildly increased left ventricle wall thickness. There was moderate global hypokinesis of the left ventricle. Systolic function was moderately reduced. Ejection fraction is visually estimated at 40%. Doppler parameters were consistent with abnormal left ventricular   relaxation (grade 1 diastolic dysfunction). The left atrium is Moderately dilated. Normally functioning bioprosthetic valve in aortic position. S/P TAVR   The maximum aortic valve gradient is 14 mmHg, the mean gradient is 7 mmHg,   and the peak velocity is 1.8 m/s. The aortic valve area (EOA) of 1.5 sq cm. The pericardium was normal in appearance with no evidence of a pericardial   effusion. There is small to moderate sized left pleural effusion. Signature      ----------------------------------------------------------------   Electronically signed by Alex Bland MD (Interpreting   physician) on 12/21/2020 at 01:10 PM   ----------------------------------------------------------------          Assessment       Diagnosis Orders   1. Chronic combined systolic and diastolic congestive heart failure (HCC)  Basic Metabolic Panel    Magnesium    Basic Metabolic Panel    Magnesium   2. Cardiomyopathy, nonischemic (HCC)  Basic Metabolic Panel    Magnesium    Basic Metabolic Panel    Magnesium   3.  Essential hypertension  Basic Metabolic Panel    Magnesium    Basic Metabolic Panel    Magnesium   4. Pure hypercholesterolemia  Basic Metabolic Panel    Magnesium    Basic Metabolic Panel    Magnesium   5. S/P TAVR (transcatheter aortic valve replacement)  Basic Metabolic Panel    Magnesium    Basic Metabolic Panel    Magnesium   6. S/P CATH - LHC AND RHC- LM-OSTAIL 10% STENOSIS, LAD-PATENT, LCX-P, RCA-P, ; RHC PAP 55/26 MEAN 38 MMHG,PCWP 26 MMHG,- TO BE SCHEDULED FOR TAVR  ASAP WITH DR. CARTAGENA  Basic Metabolic Panel    Magnesium    Basic Metabolic Panel    Magnesium         Plan     Meds and labs reviewed     Continue the current treatment and with constant vigilance to changes in symptoms and also any potential side effects. Return for care or seek medical attention immediately if symptoms got worse and/or develop new symptoms. Congestive heart failure: no evidence of fluid overload today, no recent hospitalization for CHF  Leg edema +1 - stable  Cont lasix 40 po qd  Cont kcl 20 po qd  No wt gain- stable    Hx Severe /critical AORTIC STENOSIS  S/p TAVR  Post Tavr echo WNL         Cardiomyopathy: improving, no CHF symptoms, no change in clinical condition. Will need periodic echocardiograms depending on symptoms. Cont lopressor 25 bid  Add lisinopril 5 mg po qd      Hypertension, on medical treatment. Seems to be under good control. Patient is compliant with medical treatment. Hyperlipidemia: on statins, followed periodically. Patient need periodic lipid and liver profile. D/w the elsy the plan of care    BMP and MG today    Discussed use, benefit, and side effects of prescribed medications. All patient questions answered. Pt voiced understanding. Instructed to continue current medications, diet and exercise. Continue risk factor modification and medical management. Patient agreed with treatment plan. Follow up as directed.     Addendum  Lab reviewed  acceptable    RTC in 2 months with ZEESHAN Moctezuma [facility]

## 2021-01-21 ENCOUNTER — HOSPITAL ENCOUNTER (OUTPATIENT)
Dept: NON INVASIVE DIAGNOSTICS | Age: 86
Discharge: HOME | End: 2021-01-21
Payer: MEDICARE

## 2021-01-21 ENCOUNTER — TELEPHONE (OUTPATIENT)
Dept: CARDIOLOGY CLINIC | Age: 86
End: 2021-01-21

## 2021-01-21 ENCOUNTER — HOSPITAL ENCOUNTER (OUTPATIENT)
Age: 86
Discharge: HOME | End: 2021-01-21
Payer: MEDICARE

## 2021-01-21 DIAGNOSIS — Z95.2 S/P TAVR (TRANSCATHETER AORTIC VALVE REPLACEMENT): ICD-10-CM

## 2021-01-21 LAB
ANION GAP SERPL CALCULATED.3IONS-SCNC: 9 MEQ/L (ref 8–16)
BUN BLDV-MCNC: 29 MG/DL (ref 7–22)
CALCIUM SERPL-MCNC: 10.7 MG/DL (ref 8.5–10.5)
CHLORIDE BLD-SCNC: 96 MEQ/L (ref 98–111)
CO2: 26 MEQ/L (ref 23–33)
CREAT SERPL-MCNC: 1.4 MG/DL (ref 0.4–1.2)
ERYTHROCYTE [DISTWIDTH] IN BLOOD BY AUTOMATED COUNT: 12.8 % (ref 11.5–14.5)
ERYTHROCYTE [DISTWIDTH] IN BLOOD BY AUTOMATED COUNT: 45 FL (ref 35–45)
GFR SERPL CREATININE-BSD FRML MDRD: 35 ML/MIN/1.73M2
GLUCOSE BLD-MCNC: 81 MG/DL (ref 70–108)
HCT VFR BLD CALC: 36.4 % (ref 37–47)
HEMOGLOBIN: 11.7 GM/DL (ref 12–16)
LV EF: 53 %
LVEF MODALITY: NORMAL
MCH RBC QN AUTO: 30.9 PG (ref 26–33)
MCHC RBC AUTO-ENTMCNC: 32.1 GM/DL (ref 32.2–35.5)
MCV RBC AUTO: 96 FL (ref 81–99)
PLATELET # BLD: 159 THOU/MM3 (ref 130–400)
PMV BLD AUTO: 12.2 FL (ref 9.4–12.4)
POTASSIUM SERPL-SCNC: 4.4 MEQ/L (ref 3.5–5.2)
RBC # BLD: 3.79 MILL/MM3 (ref 4.2–5.4)
SODIUM BLD-SCNC: 131 MEQ/L (ref 135–145)
WBC # BLD: 6.7 THOU/MM3 (ref 4.8–10.8)

## 2021-01-21 PROCEDURE — 93306 TTE W/DOPPLER COMPLETE: CPT

## 2021-01-21 PROCEDURE — 36415 COLL VENOUS BLD VENIPUNCTURE: CPT

## 2021-01-21 PROCEDURE — 80048 BASIC METABOLIC PNL TOTAL CA: CPT

## 2021-01-21 PROCEDURE — 85027 COMPLETE CBC AUTOMATED: CPT

## 2021-01-22 NOTE — TELEPHONE ENCOUNTER
SPOKE WITH PATIENT AND SHE WANTS TO KNOW WHY SHE HAS TO WAIT 2 MONTHS. SHE STATES SHE HAS A FOLLOW UP WITH DR CARTAGENA ON 1-27-21. SHE STATES THAT 2 MONTHS IS REALLY AN IMPOSITION FOR HER.

## 2021-01-25 NOTE — TELEPHONE ENCOUNTER
Dr. Karina Pearson evaluate her and  may decid on driving situation  Brad Marcus may bring it up at the office visit with dr. Nena Calabrese

## 2021-01-27 ENCOUNTER — OFFICE VISIT (OUTPATIENT)
Dept: CARDIOLOGY CLINIC | Age: 86
End: 2021-01-27
Payer: MEDICARE

## 2021-01-27 ENCOUNTER — TELEPHONE (OUTPATIENT)
Dept: CARDIOLOGY CLINIC | Age: 86
End: 2021-01-27

## 2021-01-27 VITALS
SYSTOLIC BLOOD PRESSURE: 138 MMHG | HEART RATE: 75 BPM | DIASTOLIC BLOOD PRESSURE: 70 MMHG | WEIGHT: 117.8 LBS | HEIGHT: 62 IN | BODY MASS INDEX: 21.68 KG/M2

## 2021-01-27 DIAGNOSIS — Z95.2 S/P TAVR (TRANSCATHETER AORTIC VALVE REPLACEMENT): Primary | ICD-10-CM

## 2021-01-27 PROCEDURE — 99212 OFFICE O/P EST SF 10 MIN: CPT | Performed by: INTERNAL MEDICINE

## 2021-01-27 RX ORDER — CLOPIDOGREL BISULFATE 75 MG/1
75 TABLET ORAL DAILY
Qty: 90 TABLET | Refills: 3 | Status: SHIPPED | OUTPATIENT
Start: 2021-01-27 | End: 2021-10-12 | Stop reason: SDUPTHER

## 2021-01-27 RX ORDER — LISINOPRIL 5 MG/1
5 TABLET ORAL DAILY
Qty: 90 TABLET | Refills: 3 | Status: SHIPPED | OUTPATIENT
Start: 2021-01-27 | End: 2021-03-19

## 2021-01-27 NOTE — PROGRESS NOTES
[address] [facility] [place] [address]  [place] [address]  Dept: [phone]  Dept Fax: [phone]  Loc: [phone]    Visit Date: 1/27/2021    Ms. Elham Patel is a 80 y.o. female  who presented for:  Chief Complaint   Patient presents with    1 Month Follow-Up     30 day fu post tavr        HPI:   HPI   81 yo F s/p cardiac arrest with urgent TAVR who presents for follow-up. She is on DAPT. She has some fogginess, but she seems to have come back almost to baseline. She does not recall the event. BP and HR well controlled. No bleeding. No chest pain, angina, JAQUEZ, orthopnea, PND, sob at rest, palpitations, LE edema, or syncope. EF 50-55%, s/p TAVR. No PVL.           Current Outpatient Medications:     lisinopril (PRINIVIL;ZESTRIL) 5 MG tablet, Take 1 tablet by mouth daily, Disp: 30 tablet, Rfl: 5    acetaminophen (TYLENOL) 325 MG tablet, Take 1 tablet by mouth every 6 hours as needed for Pain, Disp: , Rfl:     metoprolol tartrate (LOPRESSOR) 25 MG tablet, Take 1 tablet by mouth 2 times daily, Disp: 60 tablet, Rfl: 1    furosemide (LASIX) 40 MG tablet, Take 1 tablet by mouth daily, Disp: 30 tablet, Rfl: 1    diclofenac sodium (VOLTAREN) 1 % GEL, Apply 2 g topically 3 times daily as needed for Pain (left chest wall), Disp: 100 g, Rfl: 0    potassium chloride (KLOR-CON M) 20 MEQ extended release tablet, Take 1 tablet by mouth daily, Disp: 30 tablet, Rfl: 1    clopidogrel (PLAVIX) 75 MG tablet, Take 1 tablet by mouth daily, Disp: 30 tablet, Rfl: 1    dicyclomine (BENTYL) 10 MG capsule, Take 1 capsule by mouth 2 times daily, Disp: , Rfl:     Multiple Vitamins-Minerals (OCUVITE PO), Take 1 tablet by mouth daily, Disp: , Rfl:     Ferrous Sulfate (IRON PO), Take 1 tablet by mouth daily, Disp: , Rfl:     aspirin 81 MG EC tablet, Take 81 mg by mouth daily, Disp: , Rfl:     ZINC PO, Take 1 tablet by mouth daily, Disp: , Rfl:     Omega-3 Fatty Acids (FISH OIL OMEGA-3 PO), Take by mouth, Disp: , Rfl:     vitamin D (ERGOCALCIFEROL) 07121 units CAPS capsule, Take 50,000 Units by mouth once a week, Disp: , Rfl:     Multiple Vitamins-Minerals (MULTI VITAMIN/MINERALS) TABS, Take  by mouth.  , Disp: , Rfl:     lovastatin (MEVACOR) 10 MG tablet, Take 1 tablet by mouth nightly., Disp: 90 tablet, Rfl: 3    loratadine (CLARITIN) 10 MG tablet, Take 10 mg by mouth daily. , Disp: , Rfl:     Past Medical History  Jp Siegel  has a past medical history of Hyperlipidemia, Hypertension, Osteoporosis, and Pneumonia. Social History  Jp Siegel  reports that she has never smoked. She has never used smokeless tobacco. She reports that she does not drink alcohol or use drugs. Family History  Jp Siegel family history is not on file. There is no family history of bicuspid aortic valve, aneurysms, heart transplant, pacemakers, defibrillators, or sudden cardiac death. Past Surgical History   Past Surgical History:   Procedure Laterality Date    ANOMALOUS VENOUS RETURN REPAIR      CARPAL TUNNEL RELEASE Right     ELBOW SURGERY      2013 right elbow    TONSILLECTOMY         Review of Systems   Constitutional: Negative for chills and fever  HENT: Negative for congestion, sinus pressure, sneezing and sore throat. Eyes: Negative for pain, discharge, redness and itching. Respiratory: Negative for apnea, cough  Gastrointestinal: Negative for blood in stool, constipation, diarrhea   Endocrine: Negative for cold intolerance, heat intolerance, polydipsia. Genitourinary: Negative for dysuria, enuresis, flank pain and hematuria. Musculoskeletal: Negative for arthralgias, joint swelling and neck pain. Neurological: Negative for numbness and headaches. Psychiatric/Behavioral: Negative for agitation, confusion, decreased concentration and dysphoric mood.      Objective:     /70   Pulse 75   Ht 5' 2\" (1.575 m)   Wt 117 lb 12.8 oz (53.4 kg)   BMI 21.55 kg/m²     Wt 12/25/2020       Lab Results   Component Value Date    MG 2.0 01/07/2021       Lab Results   Component Value Date    INR 1.13 12/20/2020    INR 1.06 12/18/2020    INR 0.99 12/18/2020         No results found for: LABA1C    Lab Results   Component Value Date    TRIG 56 01/17/2013    HDL 91 01/17/2013    HDL 68 01/11/2012    LDLCALC 106 01/17/2013       Lab Results   Component Value Date    TSH 2.210 12/19/2020         Testing Reviewed:      I have individually reviewed the cardiac test below:    ECHO:   Results for orders placed during the hospital encounter of 01/21/21   ECHO Complete 2D W Doppler W Color    Narrative Transthoracic Echocardiography Report (TTE)     Demographics      Patient Name    Marita Causey Gender                Female      MR #            [id number]        Race                                                         Ethnicity      Account #       [de-identified]        Room Number      Accession       [id number]       Date of Study         01/21/2021   Number      Date of Birth   09/25/1931       Referring Physician   Penelope Main MD      Age             80 year(s)       Juan Aguirre MD                                    Physician     Procedure    Type of Study      TTE procedure:ECHOCARDIOGRAM COMPLETE 2D W DOPPLER W COLOR. Procedure Date  Date: 01/21/2021 Start: 10:57 AM    Study Location: Echo Lab  Technical Quality: Adequate visualization    Indications:Post TAVR 30 days. Additional Medical History:Hypertension, Hyperlipidemia, Anemia, CHF,  Cardiomyopathy    Patient Status: Routine    Height: 68.9 inches Weight: 120 pounds BSA: 1.66 m^2 BMI: 17.77 kg/m^2    BP: 132/55 mmHg    Allergies    - No Known Allergies. - Tomatoes and tomato products.      Conclusions      Summary   Normal left ventricular size and systolic function. There were no regional wall motion abnormalities. Wall thickness was within normal limits. Ejection fraction was estimated at 50-55%   Doppler parameters were consistent with abnormal left ventricular   relaxation (grade 1 diastolic dysfunction). S/p TAVR. DOPPLER: Transaortic velocity was within the normal range with   no evidence of aortic stenosis (V max 1.4 m/s, mean gradient 4 mmHg). There was no trace of aortic regurgitation. IVC size is midly dilated with preserved respiratory phasic changes   (CVP~5-10 mmHg). Signature      ----------------------------------------------------------------   Electronically signed by Denise Davis MD (Interpreting   physician) on 01/22/2021 at 03:47 PM   ----------------------------------------------------------------      Findings      Mitral Valve   The mitral valve structure was normal with normal leaflet separation. DOPPLER: The transmitral velocity was within the normal range with no   evidence for mitral stenosis. There was no evidence of mitral   regurgitation. Aortic Valve   S/p TAVR. DOPPLER: Transaortic velocity was within the normal range with   no evidence of aortic stenosis (V max 1.4 m/s, mean gradient 4 mmHg). There was no trace of aortic regurgitation. Tricuspid Valve   The tricuspid valve structure was normal with normal leaflet separation. DOPPLER: There was no evidence of tricuspid stenosis. There was no   evidence of tricuspid regurgitation. Pulmonic Valve   The pulmonic valve leaflets were not well seen. DOPPLER: The transpulmonic   velocity was within the normal range with no evidence for regurgitation. Left Atrium   Left atrial size was normal.      Left Ventricle   Normal left ventricular size and systolic function. There were no regional wall motion abnormalities. Wall thickness was within normal limits.    Ejection fraction was estimated at 50-55%   Doppler parameters were consistent with abnormal left ventricular   relaxation (grade 1 diastolic dysfunction). Right Atrium   Right atrial size was normal.      Right Ventricle   The right ventricular size was normal with normal systolic function and   wall thickness. Pericardial Effusion   The pericardium was normal in appearance with no evidence of a pericardial   effusion. Pleural Effusion   No evidence of pleural effusion. Aorta / Great Vessels   IVC size is midly dilated with preserved respiratory phasic changes   (CVP~5-10 mmHg).      M-Mode/2D Measurements & Calculations      LV Diastolic      LV Systolic Dimension: 3.2 cm    LA Dimension: 2.6 cmLA   Dimension: 4.3 cm LV Volume Diastolic: 16.5 ml     Area: 12.1 cm^2   LV FS:25.6 %      LV Volume Systolic: 41 ml   LV PW Diastolic:  LV EDV/LV EDV Index: 83.1 ml/50   0.8 cm            m^2LV ESV/LV ESV Index: 41 EB/83   Septum Diastolic: m^2                              RV Diastolic Dimension:   0.8 cm            EF Calculated: 50.7 %            2.1 cm                                                         LA volume/Index: 31.5                     LVOT: 1.5 cm                     ml /19m^2     Doppler Measurements & Calculations      MV Peak E-Wave:     AV Peak Velocity: 142    LVOT Peak Velocity: 73.8 cm/s   74.1 cm/s           cm/s                     LVOT Mean Velocity: 56.4 cm/s   MV Peak A-Wave: 121 AV Peak Gradient: 8.07   LVOT Peak Gradient: 2   cm/s                mmHg                     mmHgLVOT Mean Gradient: 1   MV E/A Ratio: 0.61  AV Mean Velocity: 90     mmHg   MV Peak Gradient:   cm/s   2.2 mmHg            AV Mean Gradient: 4 mmHg TV Peak E-Wave: 41.1 cm/s                       AV VTI: 28.8 cm          TV Peak A-Wave: 36.8 cm/s   MV Deceleration     AV Area   Time: 306 msec      (Continuity):1.09 cm^2   TV Peak Gradient: 0.68 mmHg   MV P1/2t: 90 msec                            TR Velocity:225 cm/s   MVA by PHT:2.44     LVOT VTI: 17.8 cm        TR Gradient:20.25 mmHg   cm^2                IVRT: 81 msec            PV Peak Velocity: 50.1 cm/s                                                PV Peak Gradient: 1 mmHg   MV E' Septal   Velocity: 3.6 cm/s  AV DVI (VTI): 0.62AV DVI   MV A' Septal        (Vmax):0.52   Velocity: 7.5 cm/s   MV E' Lateral   Velocity: 6 cm/s   MV A' Lateral   Velocity: 10.2 cm/s   E/E' septal: 20.58   E/E' lateral: 12.35     [id number]        Assessment/Plan   S/p TAVR - 30 day follow-up; Ev[employer]ut PRO+ 26  Preserved EF, NYHA II  Syncope and arrest related to AS, now treated, she wants to drive, she has never had a syncopal event or arrhythmogenic arrest prior to this issue. No functional limitations. DAPT no bleeding. Cardiac rehab. Discussed diet/exercise/BP/weight loss/health lifestyle choices/lipids; the patient understands the goals and will try to comply.     Disposition:  1 year         Electronically signed by Julio White MD   1/27/2021 at 1:49 PM EST

## 2021-03-19 ENCOUNTER — OFFICE VISIT (OUTPATIENT)
Dept: CARDIOLOGY CLINIC | Age: 86
End: 2021-03-19
Payer: MEDICARE

## 2021-03-19 VITALS
SYSTOLIC BLOOD PRESSURE: 147 MMHG | WEIGHT: 118 LBS | DIASTOLIC BLOOD PRESSURE: 67 MMHG | HEART RATE: 59 BPM | HEIGHT: 62 IN | BODY MASS INDEX: 21.71 KG/M2

## 2021-03-19 DIAGNOSIS — R06.02 SOB (SHORTNESS OF BREATH) ON EXERTION: ICD-10-CM

## 2021-03-19 DIAGNOSIS — I10 ESSENTIAL HYPERTENSION: Primary | ICD-10-CM

## 2021-03-19 DIAGNOSIS — I50.32 CHRONIC DIASTOLIC CONGESTIVE HEART FAILURE (HCC): ICD-10-CM

## 2021-03-19 DIAGNOSIS — I42.8 CARDIOMYOPATHY, NONISCHEMIC (HCC): ICD-10-CM

## 2021-03-19 DIAGNOSIS — Z98.890 S/P CARDIAC CATH: ICD-10-CM

## 2021-03-19 DIAGNOSIS — Z95.2 S/P TAVR (TRANSCATHETER AORTIC VALVE REPLACEMENT): ICD-10-CM

## 2021-03-19 PROCEDURE — 99214 OFFICE O/P EST MOD 30 MIN: CPT | Performed by: INTERNAL MEDICINE

## 2021-03-19 RX ORDER — LISINOPRIL 10 MG/1
10 TABLET ORAL DAILY
Qty: 30 TABLET | Refills: 5 | Status: SHIPPED | OUTPATIENT
Start: 2021-03-19 | End: 2021-08-16

## 2021-03-19 NOTE — PROGRESS NOTES
Chief Complaint   Patient presents with    Follow-up     2 month follow up     Congestive Heart Failure      Hx of  TAVR  After TAVR  NO more chest pain  Sob on exertion much better  No more dizziness  Feel better    Pt is here for: 2 month follow up     Last EKG was done on: 12/18/2020. Denied cp, sob, palpitations or edema    No dizziness  Had balance issue      EKG done 12/18/20    Never smoked    Known to have murmur for yrs    [address]  None for CAD      Patient Active Problem List   Diagnosis    HTN (hypertension)    Hyperlipidemia    Osteoporosis    Allergic rhinitis    IBS (irritable bowel syndrome)    Osteoarthrosis involving multiple sites    Medication monitoring encounter    Postmenopausal bone loss    Anemia    Right hand weakness, atrophy of thumb muscle.  SOB (shortness of breath) on exertion    Chronic congestive heart failure (HCC)    Systolic ejection murmur 5/6 best aortic area    Pansystolic murmur- best tricuspid area 5/6    Severe aortic stenosis    Cardiomyopathy (HCC)    S/P CATH - LHC AND RHC- LM-OSTAIL 10% STENOSIS, LAD-PATENT, LCX-P, RCA-P, ; RHC PAP 55/26 MEAN 38 MMHG,PCWP 26 MMHG,- TO BE SCHEDULED FOR TAVR  ASAP WITH DR. CARTAGENA    S/P right and left heart catheterization    Cardiac arrest ([address]    Moderate malnutrition ([address]    Hypoxic encephalopathy (HCC)    S/P TAVR (transcatheter aortic valve replacement)    Cardiomyopathy, nonischemic (HCC)    Chronic diastolic congestive heart failure ([address]       Past Surgical History:   Procedure Laterality Date    ANOMALOUS VENOUS RETURN REPAIR      CARPAL TUNNEL RELEASE Right     ELBOW SURGERY      2013 right elbow    TONSILLECTOMY         Allergies   Allergen Reactions    Tomato Hives        History reviewed. No pertinent family history. Social History     Socioeconomic History    Marital status:       Spouse name: Not on file    Number of children: Not on file    Years of education: Not on file    diastolic dysfunction). The left atrium is Mildly dilated. There is severe/critical/ aortic stenosis with valve area of 0.5 to 0.6 sq   cm. The maximum aortic valve gradient is 125 mmHg, the mean gradient is 84   mmHg, and the peak velocity is 5.6 m/s. Signature    ----------------------------------------------------------------   Electronically signed by Abdulaziz Gaming MD (Interpreting   physician) on 12/02/2020 at 05:08 PM    Conclusions      Summary   Left ventricle size is normal.   Mildly increased left ventricle wall thickness. There was moderate global hypokinesis of the left ventricle. Systolic function was moderately reduced. Ejection fraction is visually estimated at 40%. Doppler parameters were consistent with abnormal left ventricular   relaxation (grade 1 diastolic dysfunction). The left atrium is Moderately dilated. Normally functioning bioprosthetic valve in aortic position. S/P TAVR   The maximum aortic valve gradient is 14 mmHg, the mean gradient is 7 mmHg,   and the peak velocity is 1.8 m/s. The aortic valve area (EOA) of 1.5 sq cm. The pericardium was normal in appearance with no evidence of a pericardial   effusion. There is small to moderate sized left pleural effusion. Signature      ----------------------------------------------------------------   Electronically signed by Abdulaziz Gaming MD (Interpreting   physician) on 12/21/2020 at 01:10 PM   ----------------------------------------------------------------    Conclusions      Summary   Normal left ventricular size and systolic function. There were no regional wall motion abnormalities. Wall thickness was within normal limits. Ejection fraction was estimated at 50-55%   Doppler parameters were consistent with abnormal left ventricular   relaxation (grade 1 diastolic dysfunction). S/p TAVR.  DOPPLER: Transaortic velocity was within the normal range with   no evidence of aortic stenosis (V max 1.4 m/s, pcp    Discussed use, benefit, and side effects of prescribed medications. All patient questions answered. Pt voiced understanding. Instructed to continue current medications, diet and exercise. Continue risk factor modification and medical management. Patient agreed with treatment plan. Follow up as directed.     RTC in 3 months with ZEESHAN and mg Leela V[facility]

## 2021-04-07 ENCOUNTER — TELEPHONE (OUTPATIENT)
Dept: CARDIOLOGY CLINIC | Age: 86
End: 2021-04-07

## 2021-04-07 NOTE — TELEPHONE ENCOUNTER
[address] ([facility])  [place]    The following questions refer to your heart failure and how it may affect your life. Please read and complete the following questions. There is no right or wrong answers. Please modseto the answer that best applies to you. 1. Heart failure affects different people in different ways. Some feel shortness of breath while others feel fatigue. Please indicate how much you are limited by heart failure (shortness of breath or fatigue) in your ability to do the following activities over the past 2 weeks. Activity Extremely Limited Quite a bit limited Moderatly Limited Slightly Limited Not at all Limited Limited for other reasons/ did not do the activity   Showering/ Bathing          x    Walking 1 block on Level ground      x   Hurrying or jogging (as if to catch a bus)      x        1         2       3  4       5   6     2. Over the past 2 weeks, how many times did you have swelling in your feet, ankles or legs when you woke up in the morning? Every morning 3 or more times per week, but not every day 1-2 times per week Less than once a week Never over the past 2 weeks         x   `        1          2   3       4                     5           3. Over the past 2 weeks, on average, how many times has fatigue limited your ability to do what you wanted? All of the time Several times per day At least once a day 3 or more times per week 1-2 times per week Less than once a week Never over the past 2 weeks           x            1        2     3  4        5       6  7        4. Over the past 2 weeks, on average, how many times has shortness of breath limited your ability to do what you    wanted? All of the time Several times per day At least once a day 3 or more times per week 1-2 times per week Less than once a week Never over the past 2 weeks           x            1        2     3  4         5       6  7     5.  Over the past 2 weeks, on average, how many times have you been forced to sleep sitting up in a chair or with at least 3 pillows to prop you up because of shortness of breath? Every night 3 or more times per week, but not every day 1-2 times per week Less than once a week Never over the past 2 weeks         x   `        1          2   3       4                     5        6.  Over the past 2 weeks, how much has your heart failure limited your enjoyment of life? It has extremely limited my enjoyment of life It has limited my enjoyment of life quite a bit It has moderately limited my enjoyment of life It has slightly limited my enjoyment of life It has not limited my enjoyment of life         x         1          2   3       4           5     7.  If you had to spend the rest of your life with your heart failure the way it is right now, how would you feel about this? Not at all satisfied Mostly dissatisfied Somewhat satisfied Mostly satisfied Completely satisfied         x              1                         2                            3                        4                            5        8. How much does your heart failure affect your lifestyle?  Please indicate how your heart failure may have limited your participation in the following activities over the past 2 weeks    Activity Severely limited Limited quite a bit Moderately limited Slightly limited Did not limit at all Does not apply or did not do this activity   Hobbies, recreational activities     x    Working or doing household chores     x    Visiting family or friends out of your home     x                1      2   3         4       5            6             Meter Walk Test  Unable to complete  Time 1:  seconds  Time 2:  seconds  Time 3:  seconds

## 2021-04-15 ENCOUNTER — TELEPHONE (OUTPATIENT)
Dept: CARDIOLOGY CLINIC | Age: 86
End: 2021-04-15

## 2021-04-15 NOTE — TELEPHONE ENCOUNTER
FYI-   Patient called into office -  LMOM  Was having some lightheadedness,  States that she went to Dr. Ana Puri office today and they made a med change. Lisinopril 10mg was changed TO  Lisinopril 5mg     Medication updated in chart.

## 2021-05-20 LAB
ANION GAP SERPL CALCULATED.3IONS-SCNC: 4 MMOL/L (ref 5–15)
BUN BLDV-MCNC: 18 MG/DL (ref 5–27)
CALCIUM SERPL-MCNC: 9.9 MG/DL (ref 8.5–10.5)
CHLORIDE BLD-SCNC: 103 MMOL/L (ref 98–109)
CO2: 28 MMOL/L (ref 22–32)
CREAT SERPL-MCNC: 1.04 MG/DL (ref 0.4–1)
EGFR AFRICAN AMERICAN: >60 ML/MIN/1.73SQ.M
EGFR IF NONAFRICAN AMERICAN: 50 ML/MIN/1.73SQ.M
GLUCOSE: 91 MG/DL (ref 65–99)
MAGNESIUM: 1.8 MG/DL (ref 1.8–2.6)
POTASSIUM SERPL-SCNC: 4.4 MMOL/L (ref 3.5–5)
SODIUM BLD-SCNC: 135 MMOL/L (ref 134–146)

## 2021-08-16 ENCOUNTER — OFFICE VISIT (OUTPATIENT)
Dept: CARDIOLOGY CLINIC | Age: 86
End: 2021-08-16
Payer: MEDICARE

## 2021-08-16 VITALS
HEIGHT: 62 IN | SYSTOLIC BLOOD PRESSURE: 183 MMHG | BODY MASS INDEX: 21.38 KG/M2 | DIASTOLIC BLOOD PRESSURE: 70 MMHG | WEIGHT: 116.2 LBS | HEART RATE: 59 BPM

## 2021-08-16 DIAGNOSIS — I50.32 CHRONIC DIASTOLIC CONGESTIVE HEART FAILURE (HCC): ICD-10-CM

## 2021-08-16 DIAGNOSIS — E78.00 PURE HYPERCHOLESTEROLEMIA: ICD-10-CM

## 2021-08-16 DIAGNOSIS — Z95.2 S/P TAVR (TRANSCATHETER AORTIC VALVE REPLACEMENT): ICD-10-CM

## 2021-08-16 DIAGNOSIS — I10 ESSENTIAL HYPERTENSION: Primary | ICD-10-CM

## 2021-08-16 DIAGNOSIS — Z98.890 S/P CARDIAC CATH: ICD-10-CM

## 2021-08-16 DIAGNOSIS — I42.8 CARDIOMYOPATHY, NONISCHEMIC (HCC): ICD-10-CM

## 2021-08-16 PROCEDURE — 99214 OFFICE O/P EST MOD 30 MIN: CPT | Performed by: INTERNAL MEDICINE

## 2021-08-16 RX ORDER — LISINOPRIL 10 MG/1
10 TABLET ORAL DAILY
Qty: 90 TABLET | Refills: 3 | Status: SHIPPED | OUTPATIENT
Start: 2021-08-16 | End: 2021-12-14

## 2021-08-16 NOTE — PROGRESS NOTES
Chief Complaint   Patient presents with    Check-Up    Hypertension    Congestive Heart Failure      Hx of  TAVR  After TAVR  NO more chest pain  Sob on exertion much better  No more dizziness  Feel better        Pt here for a 5 month f/u    EKG done 12-18-20    Denied cp, sob, palpitations or edema    No dizziness  Had balance issue    EKG done 12/18/20    Never smoked    Known to have murmur for yrs    FHX  None for CAD      Patient Active Problem List   Diagnosis    HTN (hypertension)    Hyperlipidemia    Osteoporosis    Allergic rhinitis    IBS (irritable bowel syndrome)    Osteoarthrosis involving multiple sites    Medication monitoring encounter    Postmenopausal bone loss    Anemia    Right hand weakness, atrophy of thumb muscle.  SOB (shortness of breath) on exertion    Chronic congestive heart failure (HCC)    Systolic ejection murmur 5/6 best aortic area    Pansystolic murmur- best tricuspid area 5/6    Severe aortic stenosis    Cardiomyopathy (HCC)    S/P CATH - LHC AND RHC- LM-OSTAIL 10% STENOSIS, LAD-PATENT, LCX-P, RCA-P, ; RHC PAP 55/26 MEAN 38 MMHG,PCWP 26 MMHG,- TO BE SCHEDULED FOR TAVR  ASAP WITH DR. CARTAGENA    S/P right and left heart catheterization    Cardiac arrest ([address]    Moderate malnutrition ([address]    Hypoxic encephalopathy (HCC)    S/P TAVR (transcatheter aortic valve replacement)    Cardiomyopathy, nonischemic (HCC)    Chronic diastolic congestive heart failure ([address]       Past Surgical History:   Procedure Laterality Date    ANOMALOUS VENOUS RETURN REPAIR      CARPAL TUNNEL RELEASE Right     ELBOW SURGERY      2013 right elbow    TONSILLECTOMY         Allergies   Allergen Reactions    Tomato Hives        History reviewed. No pertinent family history. Social History     Socioeconomic History    Marital status:       Spouse name: Not on file    Number of children: Not on file    Years of education: Not on file    Highest education level: Not on Take 1 tablet by mouth daily      aspirin 81 MG EC tablet Take 81 mg by mouth daily      ZINC PO Take 1 tablet by mouth daily      Omega-3 Fatty Acids (FISH OIL OMEGA-3 PO) Take by mouth      vitamin D (ERGOCALCIFEROL) 24733 units CAPS capsule Take 50,000 Units by mouth once a week      Multiple Vitamins-Minerals (MULTI VITAMIN/MINERALS) TABS Take  by mouth.  loratadine (CLARITIN) 10 MG tablet Take 10 mg by mouth daily. No current facility-administered medications for this visit. Review of Systems -     General ROS: negative  Psychological ROS: negative  Hematological and Lymphatic ROS: No history of blood clots or bleeding disorder. Respiratory ROS: no cough,  or wheezing, the rest see HPI  Cardiovascular ROS: See HPI  Gastrointestinal ROS: negative  Genito-Urinary ROS: no dysuria, trouble voiding, or hematuria  Musculoskeletal ROS: negative  Neurological ROS: no TIA or stroke symptoms  Dermatological ROS: negative      Blood pressure (!) 183/70, pulse 59, height 5' 2\" (1.575 m), weight 116 lb 3.2 oz (52.7 kg).         Physical Examination:    General appearance - alert, well appearing, and in no distress  HEENT- Pink conjunctiva  , Non-icteri sclera,PERRLA  Mental status - alert, oriented to person, place, and time  Neck - supple, no significant adenopathy, no JVD, or carotid bruits  Chest - clear to auscultation, no wheezes, rales or rhonchi, symmetric air entry  Heart - normal rate, regular rhythm, normal S1, S2, no murmurs, rubs, clicks or gallops  Abdomen - soft, nontender, nondistended, no masses or organomegaly  HODAN- no CVA or flank tenderness, no suprapubic tenderness  Neurological - alert, oriented, normal speech, no focal findings or movement disorder noted  Musculoskeletal/limbs - no joint tenderness, deformity or swelling   - peripheral pulses normal, no pedal edema, no clubbing or cyanosis  Skin - normal coloration and turgor, no rashes, no suspicious skin lesions noted  Psych- appropriate mood and affect    Lab  No results for input(s): CKTOTAL, CKMB, CKMBINDEX, TROPONINI in the last 72 hours. CBC:   Lab Results   Component Value Date    WBC 6.7 01/21/2021    RBC 3.79 01/21/2021    HGB 11.7 01/21/2021    HCT 36.4 01/21/2021    MCV 96.0 01/21/2021    MCH 30.9 01/21/2021    MCHC 32.1 01/21/2021    RDW 13.0 01/17/2013     01/21/2021    MPV 12.2 01/21/2021     BMP:    Lab Results   Component Value Date     05/19/2021    K 4.4 05/19/2021    K 4.9 12/25/2020     05/19/2021    CO2 28 05/19/2021    BUN 18 05/19/2021    LABALBU 3.6 12/25/2020    CREATININE 1.04 05/19/2021    CALCIUM 9.9 05/19/2021    LABGLOM 35 01/21/2021    GLUCOSE 91 05/19/2021     Hepatic Function Panel:    Lab Results   Component Value Date    ALKPHOS 52 12/25/2020    ALT 18 12/25/2020    AST 26 12/25/2020    PROT 5.2 12/25/2020    BILITOT 0.2 12/25/2020    BILIDIR <0.2 12/18/2020    LABALBU 3.6 12/25/2020     Magnesium:    Lab Results   Component Value Date    MG 1.8 05/19/2021     Warfarin PT/INR:  No components found for: PTPATWAR, PTINRWAR  HgBA1c:  No results found for: LABA1C  FLP:    Lab Results   Component Value Date    TRIG 56 01/17/2013    HDL 91 01/17/2013    HDL 68 01/11/2012    LDLCALC 106 01/17/2013     TSH:    Lab Results   Component Value Date    TSH 2.210 12/19/2020       ekg  Normal 11/24/2020sinus rhythm  Possible Left atrial enlargement  LVH  Nonspecific T wave abnormality  Abnormal ECG  When compared with ECG of 24-NOV-2020 12:12,  No significant change was found     Conclusions      Summary   Left ventricle size is normal.   Mildly increased left ventricle wall thickness. Mild concentric left ventricular hypertrophy. There was moderate global hypokinesis of the left ventricle. Systolic function was moderately reduced. Ejection fraction is visually estimated in the range of 40% to 45%.    Doppler parameters were consistent with abnormal left ventricular   relaxation (grade 1 diastolic dysfunction). The left atrium is Mildly dilated. There is severe/critical/ aortic stenosis with valve area of 0.5 to 0.6 sq   cm. The maximum aortic valve gradient is 125 mmHg, the mean gradient is 84   mmHg, and the peak velocity is 5.6 m/s. Signature    ----------------------------------------------------------------   Electronically signed by Poonam Jane MD (Interpreting   physician) on 12/02/2020 at 05:08 PM    Conclusions      Summary   Left ventricle size is normal.   Mildly increased left ventricle wall thickness. There was moderate global hypokinesis of the left ventricle. Systolic function was moderately reduced. Ejection fraction is visually estimated at 40%. Doppler parameters were consistent with abnormal left ventricular   relaxation (grade 1 diastolic dysfunction). The left atrium is Moderately dilated. Normally functioning bioprosthetic valve in aortic position. S/P TAVR   The maximum aortic valve gradient is 14 mmHg, the mean gradient is 7 mmHg,   and the peak velocity is 1.8 m/s. The aortic valve area (EOA) of 1.5 sq cm. The pericardium was normal in appearance with no evidence of a pericardial   effusion. There is small to moderate sized left pleural effusion. Signature      ----------------------------------------------------------------   Electronically signed by Poonam Jane MD (Interpreting   physician) on 12/21/2020 at 01:10 PM   ----------------------------------------------------------------    Conclusions      Summary   Normal left ventricular size and systolic function. There were no regional wall motion abnormalities. Wall thickness was within normal limits. Ejection fraction was estimated at 50-55%   Doppler parameters were consistent with abnormal left ventricular   relaxation (grade 1 diastolic dysfunction). S/p TAVR.  DOPPLER: Transaortic velocity was within the normal range with   no evidence of aortic stenosis (V max 1.4 m/s, mean gradient 4 mmHg). There was no trace of aortic regurgitation. IVC size is midly dilated with preserved respiratory phasic changes   (CVP~5-10 mmHg). Signature      ----------------------------------------------------------------   Electronically signed by Hemant Ozuna MD (Interpreting   physician) on 01/22/2021 at 03:47 PM   ----------------------------------------------------------------        Assessment       Diagnosis Orders   1. Essential hypertension     2. Cardiomyopathy, nonischemic ([address]     3. Chronic diastolic congestive heart failure ([facility]     4. Pure hypercholesterolemia     5. S/P CATH - LHC AND RHC- LM-OSTAIL 10% STENOSIS, LAD-PATENT, LCX-P, RCA-P, ; RHC PAP 55/26 MEAN 38 MMHG,PCWP 26 MMHG,- TO BE SCHEDULED FOR TAVR  ASAP WITH DR. CARTAGENA     6. S/P TAVR (transcatheter aortic valve replacement)           Plan     The  Most current Meds and labs reviewed     Continue the current treatment and with constant vigilance to changes in symptoms and also any potential side effects. Return for care or seek medical attention immediately if symptoms got worse and/or develop new symptoms. Congestive heart failure: no evidence of fluid overload today, no recent hospitalization for CHF  Leg edema +1  Resolved  Off  lasix 40 po qd for 2 month  Off  kcl 20 po qd  No wt gain- stable in the last 3 months    Hx Severe /critical AORTIC STENOSIS  S/p TAVR  Post Tavr echo WNL       Cardiomyopathy: improving, no CHF symptoms, no change in clinical condition. Will need periodic echocardiograms depending on symptoms. EF improved to 55% from 40%  Cont lopressor 25 bid    Hypertension, on medical treatment. Seems to be under good control. Patient is compliant with medical treatment. Advised to increase lisinopril 10 mg po qd for BP control  \Home Bp advised and come to office with log if sbp > 150 persistently     Hyperlipidemia: on statins, followed periodically.  Patient need periodic lipid and liver profile. D/w the pat the plan of care    Hx of headache- see pcp    Discussed use, benefit, and side effects of prescribed medications. All patient questions answered. Pt voiced understanding. Instructed to continue current medications, diet and exercise. Continue risk factor modification and medical management. Patient agreed with treatment plan. Follow up as directed.     RTC in 3 months      [facility]

## 2021-10-05 ENCOUNTER — TELEPHONE (OUTPATIENT)
Dept: CARDIOLOGY CLINIC | Age: 86
End: 2021-10-05

## 2021-10-05 DIAGNOSIS — Z95.2 HISTORY OF TRANSCATHETER AORTIC VALVE REPLACEMENT (TAVR): Primary | ICD-10-CM

## 2021-10-05 NOTE — TELEPHONE ENCOUNTER
Orders received from Dr. Marbella Martell to schedule the patient for her 1 year post TAVR follow up appointment with an ECHO, CBC, and BMP. Appointment is scheduled for 12/14/2021 at 1145. ECHO scheduled for 12/6/2021 at 1100.

## 2021-10-12 RX ORDER — CLOPIDOGREL BISULFATE 75 MG/1
75 TABLET ORAL DAILY
Qty: 90 TABLET | Refills: 0 | Status: SHIPPED | OUTPATIENT
Start: 2021-10-12 | End: 2021-12-14

## 2021-11-15 ENCOUNTER — OFFICE VISIT (OUTPATIENT)
Dept: CARDIOLOGY CLINIC | Age: 86
End: 2021-11-15
Payer: MEDICARE

## 2021-11-15 VITALS
WEIGHT: 119.2 LBS | HEIGHT: 60 IN | BODY MASS INDEX: 23.4 KG/M2 | HEART RATE: 71 BPM | DIASTOLIC BLOOD PRESSURE: 88 MMHG | SYSTOLIC BLOOD PRESSURE: 142 MMHG

## 2021-11-15 DIAGNOSIS — E78.00 PURE HYPERCHOLESTEROLEMIA: ICD-10-CM

## 2021-11-15 DIAGNOSIS — I10 PRIMARY HYPERTENSION: Primary | ICD-10-CM

## 2021-11-15 DIAGNOSIS — I42.8 CARDIOMYOPATHY, NONISCHEMIC (HCC): ICD-10-CM

## 2021-11-15 DIAGNOSIS — Z95.2 S/P TAVR (TRANSCATHETER AORTIC VALVE REPLACEMENT): ICD-10-CM

## 2021-11-15 DIAGNOSIS — Z98.890 S/P CARDIAC CATH: ICD-10-CM

## 2021-11-15 DIAGNOSIS — I50.32 CHRONIC DIASTOLIC CONGESTIVE HEART FAILURE (HCC): ICD-10-CM

## 2021-11-15 PROCEDURE — 99214 OFFICE O/P EST MOD 30 MIN: CPT | Performed by: INTERNAL MEDICINE

## 2021-11-15 NOTE — PROGRESS NOTES
Chief Complaint   Patient presents with    3 Month Follow-Up    Hypertension    Check-Up      Hx of  TAVR  After TAVR  NO more chest pain  Sob on exertion much better  No more dizziness  Feel better      Pt here for a 3 month f/u    EKG done 12-18-20    Denied cp, sob, palpitations or edema    No dizziness  Had balance issue    EKG done 12/18/20    Never smoked    Known to have murmur for yrs    FHX  None for CAD      Patient Active Problem List   Diagnosis    HTN (hypertension)    Hyperlipidemia    Osteoporosis    Allergic rhinitis    IBS (irritable bowel syndrome)    Osteoarthrosis involving multiple sites    Medication monitoring encounter    Postmenopausal bone loss    Anemia    Right hand weakness, atrophy of thumb muscle.  SOB (shortness of breath) on exertion    Chronic congestive heart failure (HCC)    Systolic ejection murmur 5/6 best aortic area    Pansystolic murmur- best tricuspid area 5/6    Severe aortic stenosis    Cardiomyopathy (HCC)    S/P CATH - LHC AND RHC- LM-OSTAIL 10% STENOSIS, LAD-PATENT, LCX-P, RCA-P, ; RHC PAP 55/26 MEAN 38 MMHG,PCWP 26 MMHG,- TO BE SCHEDULED FOR TAVR  ASAP WITH DR. CARTAGENA    S/P right and left heart catheterization    Cardiac arrest ([address]    Moderate malnutrition ([address]    Hypoxic encephalopathy (HCC)    S/P TAVR (transcatheter aortic valve replacement)    Cardiomyopathy, nonischemic (HCC)    Chronic diastolic congestive heart failure ([address]       Past Surgical History:   Procedure Laterality Date    ANOMALOUS VENOUS RETURN REPAIR      CARPAL TUNNEL RELEASE Right     ELBOW SURGERY      2013 right elbow    TONSILLECTOMY         Allergies   Allergen Reactions    Tomato Hives        History reviewed. No pertinent family history. Social History     Socioeconomic History    Marital status:       Spouse name: Not on file    Number of children: Not on file    Years of education: Not on file    Highest education level: Not on file Occupational History    Not on file   Tobacco Use    Smoking status: Never Smoker    Smokeless tobacco: Never Used   Substance and Sexual Activity    Alcohol use: No     Comment: rare    Drug use: No    Sexual activity: Not on file   Other Topics Concern    Not on file   Social History Narrative    Not on file     Social Determinants of Health     Financial Resource Strain:     Difficulty of Paying Living Expenses: Not on file   Food Insecurity:     Worried About Running Out of Food in the Last Year: Not on file    Kenneth of Food in the Last Year: Not on file   Transportation Needs:     Lack of Transportation (Medical): Not on file    Lack of Transportation (Non-Medical):  Not on file   Physical Activity:     Days of Exercise per Week: Not on file    Minutes of Exercise per Session: Not on file   Stress:     Feeling of Stress : Not on file   Social Connections:     Frequency of Communication with Friends and Family: Not on file    Frequency of Social Gatherings with Friends and Family: Not on file    Attends Denominational Services: Not on file    Active Member of [address] [place] [address] or Organizations: Not on file    Attends Club or Organization Meetings: Not on file    Marital Status: Not on file   Intimate Partner Violence:     Fear of Current or Ex-Partner: Not on file    Emotionally Abused: Not on file    Physically Abused: Not on file    Sexually Abused: Not on file   Housing Stability:     Unable to Pay for Housing in the Last Year: Not on file    Number of [place] in the Last Year: Not on file    Unstable Housing in the Last Year: Not on file       Current Outpatient Medications   Medication Sig Dispense Refill    metoprolol tartrate (LOPRESSOR) 25 MG tablet Take 1 tablet by mouth 2 times daily 180 tablet 0    clopidogrel (PLAVIX) 75 MG tablet Take 1 tablet by mouth daily 90 tablet 0    lisinopril (PRINIVIL;ZESTRIL) 10 MG tablet Take 1 tablet by mouth daily 90 tablet 3    acetaminophen (TYLENOL) 325 MG tablet Take 1 tablet by mouth every 6 hours as needed for Pain      diclofenac sodium (VOLTAREN) 1 % GEL Apply 2 g topically 3 times daily as needed for Pain (left chest wall) 100 g 0    dicyclomine (BENTYL) 10 MG capsule Take 1 capsule by mouth 2 times daily      Multiple Vitamins-Minerals (OCUVITE PO) Take 1 tablet by mouth daily      Ferrous Sulfate (IRON PO) Take 1 tablet by mouth daily      aspirin 81 MG EC tablet Take 81 mg by mouth daily      ZINC PO Take 1 tablet by mouth daily      Omega-3 Fatty Acids (FISH OIL OMEGA-3 PO) Take by mouth      vitamin D (ERGOCALCIFEROL) 03758 units CAPS capsule Take 50,000 Units by mouth once a week      Multiple Vitamins-Minerals (MULTI VITAMIN/MINERALS) TABS Take  by mouth.  loratadine (CLARITIN) 10 MG tablet Take 10 mg by mouth daily. No current facility-administered medications for this visit. Review of Systems -     General ROS: negative  Psychological ROS: negative  Hematological and Lymphatic ROS: No history of blood clots or bleeding disorder. Respiratory ROS: no cough,  or wheezing, the rest see HPI  Cardiovascular ROS: See HPI  Gastrointestinal ROS: negative  Genito-Urinary ROS: no dysuria, trouble voiding, or hematuria  Musculoskeletal ROS: negative  Neurological ROS: no TIA or stroke symptoms  Dermatological ROS: negative      Blood pressure (!) 142/88, pulse 71, height 5' (1.524 m), weight 119 lb 3.2 oz (54.1 kg).         Physical Examination:    General appearance - alert, well appearing, and in no distress  HEENT- Pink conjunctiva  , Non-icteri sclera,PERRLA  Mental status - alert, oriented to person, place, and time  Neck - supple, no significant adenopathy, no JVD, or carotid bruits  Chest - clear to auscultation, no wheezes, rales or rhonchi, symmetric air entry  Heart - normal rate, regular rhythm, normal S1, S2, no murmurs, rubs, clicks or gallops  Abdomen - soft, nontender, nondistended, no masses or organomegaly  HODAN- no CVA or flank tenderness, no suprapubic tenderness  Neurological - alert, oriented, normal speech, no focal findings or movement disorder noted  Musculoskeletal/limbs - no joint tenderness, deformity or swelling   - peripheral pulses normal, no pedal edema, no clubbing or cyanosis  Skin - normal coloration and turgor, no rashes, no suspicious skin lesions noted  Psych- appropriate mood and affect    Lab  No results for input(s): CKTOTAL, CKMB, CKMBINDEX, TROPONINI in the last 72 hours.   CBC:   Lab Results   Component Value Date    WBC 6.7 01/21/2021    RBC 3.79 01/21/2021    HGB 11.7 01/21/2021    HCT 36.4 01/21/2021    MCV 96.0 01/21/2021    MCH 30.9 01/21/2021    MCHC 32.1 01/21/2021    RDW 13.0 01/17/2013     01/21/2021    MPV 12.2 01/21/2021     BMP:    Lab Results   Component Value Date     05/19/2021    K 4.4 05/19/2021    K 4.9 12/25/2020     05/19/2021    CO2 28 05/19/2021    BUN 18 05/19/2021    LABALBU 3.6 12/25/2020    CREATININE 1.04 05/19/2021    CALCIUM 9.9 05/19/2021    LABGLOM 35 01/21/2021    GLUCOSE 91 05/19/2021     Hepatic Function Panel:    Lab Results   Component Value Date    ALKPHOS 52 12/25/2020    ALT 18 12/25/2020    AST 26 12/25/2020    PROT 5.2 12/25/2020    BILITOT 0.2 12/25/2020    BILIDIR <0.2 12/18/2020    LABALBU 3.6 12/25/2020     Magnesium:    Lab Results   Component Value Date    MG 1.8 05/19/2021     Warfarin PT/INR:  No components found for: PTPATWAR, PTINRWAR  HgBA1c:  No results found for: LABA1C  FLP:    Lab Results   Component Value Date    TRIG 56 01/17/2013    HDL 91 01/17/2013    HDL 68 01/11/2012    LDLCALC 106 01/17/2013     TSH:    Lab Results   Component Value Date    TSH 2.210 12/19/2020       ekg  Normal 11/24/2020sinus rhythm  Possible Left atrial enlargement  LVH  Nonspecific T wave abnormality  Abnormal ECG  When compared with ECG of 24-NOV-2020 12:12,  No significant change was found     Conclusions      Summary   Left ventricle size is normal.   Mildly increased left ventricle wall thickness. Mild concentric left ventricular hypertrophy. There was moderate global hypokinesis of the left ventricle. Systolic function was moderately reduced. Ejection fraction is visually estimated in the range of 40% to 45%. Doppler parameters were consistent with abnormal left ventricular   relaxation (grade 1 diastolic dysfunction). The left atrium is Mildly dilated. There is severe/critical/ aortic stenosis with valve area of 0.5 to 0.6 sq   cm. The maximum aortic valve gradient is 125 mmHg, the mean gradient is 84   mmHg, and the peak velocity is 5.6 m/s. Signature    ----------------------------------------------------------------   Electronically signed by Robin Ceron MD (Interpreting   physician) on 12/02/2020 at 05:08 PM    Conclusions      Summary   Left ventricle size is normal.   Mildly increased left ventricle wall thickness. There was moderate global hypokinesis of the left ventricle. Systolic function was moderately reduced. Ejection fraction is visually estimated at 40%. Doppler parameters were consistent with abnormal left ventricular   relaxation (grade 1 diastolic dysfunction). The left atrium is Moderately dilated. Normally functioning bioprosthetic valve in aortic position. S/P TAVR   The maximum aortic valve gradient is 14 mmHg, the mean gradient is 7 mmHg,   and the peak velocity is 1.8 m/s. The aortic valve area (EOA) of 1.5 sq cm. The pericardium was normal in appearance with no evidence of a pericardial   effusion. There is small to moderate sized left pleural effusion.       Signature      ----------------------------------------------------------------   Electronically signed by Robin Ceron MD (Interpreting   physician) on 12/21/2020 at 01:10 PM   ----------------------------------------------------------------    Conclusions      Summary   Normal left ventricular size and systolic function. There were no regional wall motion abnormalities. Wall thickness was within normal limits. Ejection fraction was estimated at 50-55%   Doppler parameters were consistent with abnormal left ventricular   relaxation (grade 1 diastolic dysfunction). S/p TAVR. DOPPLER: Transaortic velocity was within the normal range with   no evidence of aortic stenosis (V max 1.4 m/s, mean gradient 4 mmHg). There was no trace of aortic regurgitation. IVC size is midly dilated with preserved respiratory phasic changes   (CVP~5-10 mmHg). Signature      ----------------------------------------------------------------   Electronically signed by Tereso Russo MD (Interpreting   physician) on 01/22/2021 at 03:47 PM   ----------------------------------------------------------------        Assessment       Diagnosis Orders   1. Primary hypertension     2. Chronic diastolic congestive heart failure (HCC)     3. Cardiomyopathy, nonischemic ([address]     4. Pure hypercholesterolemia     5. S/P CATH - LHC AND RHC- LM-OSTAIL 10% STENOSIS, LAD-PATENT, LCX-P, RCA-P, ; RHC PAP 55/26 MEAN 38 MMHG,PCWP 26 MMHG,- TO BE SCHEDULED FOR TAVR  ASAP WITH DR. CARTAGENA     6. S/P TAVR (transcatheter aortic valve replacement)           Plan     The  current Meds and labs reviewed     Continue the current treatment and with constant vigilance to changes in symptoms and also any potential side effects. Return for care or seek medical attention immediately if symptoms got worse and/or develop new symptoms. Congestive heart failure: no evidence of fluid overload today, no recent hospitalization for CHF  Leg edema +1  Resolved  Off  lasix 40 po qd for 2 month  Off  kcl 20 po qd  No wt gain- stable in the last 3 months    Hx Severe /critical AORTIC STENOSIS  S/p TAVR  Post Tavr echo WNL  Faint aortic area Murmur         Cardiomyopathy: improving, no CHF symptoms, no change in clinical condition.  Will need periodic echocardiograms depending on symptoms. EF improved to 55% from 40%  Cont lopressor 25 bid    Hypertension, on medical treatment. Seems to be under good control. Patient is compliant with medical treatment. Cont  lisinopril 10 mg po qd for BP control  Home Bp advised and come to office with log if sbp > 150 persistently    Hx of dizziness on standing  At [facility] office sbp 124 noted     Hyperlipidemia: on statins, followed periodically. Patient need periodic lipid and liver profile. D/w the pat the plan of care    Overall better and doing well    Discussed use, benefit, and side effects of prescribed medications. All patient questions answered. Pt voiced understanding. Instructed to continue current medications, diet and exercise. Continue risk factor modification and medical management. Patient agreed with treatment plan. Follow up as directed.     RTC in 3 months      [facility]

## 2021-12-06 ENCOUNTER — HOSPITAL ENCOUNTER (OUTPATIENT)
Dept: NON INVASIVE DIAGNOSTICS | Age: 86
Discharge: HOME | End: 2021-12-06
Payer: MEDICARE

## 2021-12-06 ENCOUNTER — HOSPITAL ENCOUNTER (OUTPATIENT)
Age: 86
Discharge: HOME | End: 2021-12-06
Payer: MEDICARE

## 2021-12-06 DIAGNOSIS — Z95.2 HISTORY OF TRANSCATHETER AORTIC VALVE REPLACEMENT (TAVR): ICD-10-CM

## 2021-12-06 LAB
ANION GAP SERPL CALCULATED.3IONS-SCNC: 14 MEQ/L (ref 8–16)
BUN BLDV-MCNC: 11 MG/DL (ref 7–22)
CALCIUM SERPL-MCNC: 10.5 MG/DL (ref 8.5–10.5)
CHLORIDE BLD-SCNC: 104 MEQ/L (ref 98–111)
CO2: 23 MEQ/L (ref 23–33)
CREAT SERPL-MCNC: 1 MG/DL (ref 0.4–1.2)
ERYTHROCYTE [DISTWIDTH] IN BLOOD BY AUTOMATED COUNT: 12.5 % (ref 11.5–14.5)
ERYTHROCYTE [DISTWIDTH] IN BLOOD BY AUTOMATED COUNT: 44.4 FL (ref 35–45)
GFR SERPL CREATININE-BSD FRML MDRD: 52 ML/MIN/1.73M2
GLUCOSE BLD-MCNC: 93 MG/DL (ref 70–108)
HCT VFR BLD CALC: 35.7 % (ref 37–47)
HEMOGLOBIN: 11.4 GM/DL (ref 12–16)
LV EF: 63 %
LVEF MODALITY: NORMAL
MCH RBC QN AUTO: 30.8 PG (ref 26–33)
MCHC RBC AUTO-ENTMCNC: 31.9 GM/DL (ref 32.2–35.5)
MCV RBC AUTO: 96.5 FL (ref 81–99)
PLATELET # BLD: 204 THOU/MM3 (ref 130–400)
PMV BLD AUTO: 11.3 FL (ref 9.4–12.4)
POTASSIUM SERPL-SCNC: 4.4 MEQ/L (ref 3.5–5.2)
RBC # BLD: 3.7 MILL/MM3 (ref 4.2–5.4)
SODIUM BLD-SCNC: 141 MEQ/L (ref 135–145)
WBC # BLD: 8 THOU/MM3 (ref 4.8–10.8)

## 2021-12-06 PROCEDURE — 93306 TTE W/DOPPLER COMPLETE: CPT

## 2021-12-06 PROCEDURE — 80048 BASIC METABOLIC PNL TOTAL CA: CPT

## 2021-12-06 PROCEDURE — 85027 COMPLETE CBC AUTOMATED: CPT

## 2021-12-06 PROCEDURE — 36415 COLL VENOUS BLD VENIPUNCTURE: CPT

## 2021-12-14 ENCOUNTER — TELEPHONE (OUTPATIENT)
Dept: CARDIOLOGY CLINIC | Age: 86
End: 2021-12-14

## 2021-12-14 ENCOUNTER — OFFICE VISIT (OUTPATIENT)
Dept: CARDIOLOGY CLINIC | Age: 86
End: 2021-12-14
Payer: MEDICARE

## 2021-12-14 VITALS
WEIGHT: 118 LBS | HEIGHT: 60 IN | SYSTOLIC BLOOD PRESSURE: 152 MMHG | BODY MASS INDEX: 23.16 KG/M2 | DIASTOLIC BLOOD PRESSURE: 80 MMHG | HEART RATE: 65 BPM

## 2021-12-14 DIAGNOSIS — Z95.2 S/P TAVR (TRANSCATHETER AORTIC VALVE REPLACEMENT): Primary | ICD-10-CM

## 2021-12-14 DIAGNOSIS — I10 ESSENTIAL HYPERTENSION: ICD-10-CM

## 2021-12-14 PROCEDURE — 99213 OFFICE O/P EST LOW 20 MIN: CPT | Performed by: INTERNAL MEDICINE

## 2021-12-14 PROCEDURE — 93000 ELECTROCARDIOGRAM COMPLETE: CPT | Performed by: INTERNAL MEDICINE

## 2021-12-14 RX ORDER — AMLODIPINE BESYLATE 5 MG/1
5 TABLET ORAL DAILY
Qty: 30 TABLET | Refills: 3 | Status: SHIPPED | OUTPATIENT
Start: 2021-12-14 | End: 2022-01-05 | Stop reason: SDUPTHER

## 2021-12-14 NOTE — PROGRESS NOTES
[id number] [facility] [address]  [facility] [address]  Dept: [phone]  Dept Fax: [phone]  Loc: [phone]    Visit Date: 12/14/2021    Ms. Yuli Arellano is a 80 y.o. female  who presented for:  Chief Complaint   Patient presents with    1 Year Follow Up     s/p TAVR    Hypertension       HPI:   HPI   81 yo F hx of TAVR - 1 year who presents for follow-up. Has been having some dizziness with Lisinopril. She notes that when she stops it, she is not dizzy. She is taking all other meds. No chest pain, angina, JAQUEZ, orthopnea, PND, sob at rest, palpitations, LE edema, or syncope. Can do all ADLS. Current Outpatient Medications:     metoprolol tartrate (LOPRESSOR) 25 MG tablet, Take 1 tablet by mouth 2 times daily, Disp: 180 tablet, Rfl: 0    clopidogrel (PLAVIX) 75 MG tablet, Take 1 tablet by mouth daily, Disp: 90 tablet, Rfl: 0    lisinopril (PRINIVIL;ZESTRIL) 10 MG tablet, Take 1 tablet by mouth daily, Disp: 90 tablet, Rfl: 3    acetaminophen (TYLENOL) 325 MG tablet, Take 1 tablet by mouth every 6 hours as needed for Pain, Disp: , Rfl:     diclofenac sodium (VOLTAREN) 1 % GEL, Apply 2 g topically 3 times daily as needed for Pain (left chest wall), Disp: 100 g, Rfl: 0    dicyclomine (BENTYL) 10 MG capsule, Take 1 capsule by mouth 2 times daily, Disp: , Rfl:     aspirin 81 MG EC tablet, Take 81 mg by mouth daily, Disp: , Rfl:     ZINC PO, Take 1 tablet by mouth daily, Disp: , Rfl:     vitamin D (ERGOCALCIFEROL) 02824 units CAPS capsule, Take 50,000 Units by mouth once a week, Disp: , Rfl:     Multiple Vitamins-Minerals (MULTI VITAMIN/MINERALS) TABS, Take  by mouth.  , Disp: , Rfl:     loratadine (CLARITIN) 10 MG tablet, Take 10 mg by mouth daily. , Disp: , Rfl:     Past Medical History  Esme Bender  has a past medical history of Hyperlipidemia, Hypertension, Osteoporosis, and Pneumonia.     Social History  Esme castellanos that she has never smoked. She has never used smokeless tobacco. She reports that she does not drink alcohol and does not use drugs. Family History  [place] Sacks family history is not on file. There is no family history of bicuspid aortic valve, aneurysms, heart transplant, pacemakers, defibrillators, or sudden cardiac death. Past Surgical History   Past Surgical History:   Procedure Laterality Date    ANOMALOUS VENOUS RETURN REPAIR      CARPAL TUNNEL RELEASE Right     ELBOW SURGERY      2013 right elbow    TONSILLECTOMY         Review of Systems   Constitutional: Negative for chills and fever  HENT: Negative for congestion, sinus pressure, sneezing and sore throat. Eyes: Negative for pain, discharge, redness and itching. Respiratory: Negative for apnea, cough  Gastrointestinal: Negative for blood in stool, constipation, diarrhea   Endocrine: Negative for cold intolerance, heat intolerance, polydipsia. Genitourinary: Negative for dysuria, enuresis, flank pain and hematuria. Musculoskeletal: Negative for arthralgias, joint swelling and neck pain. Neurological: Negative for numbness and headaches. Psychiatric/Behavioral: Negative for agitation, confusion, decreased concentration and dysphoric mood. Objective:     BP (!) 152/80   Pulse 65   Ht 5' (1.524 m)   Wt 118 lb (53.5 kg)   BMI 23.05 kg/m²     Wt Readings from Last 3 Encounters:   12/14/21 118 lb (53.5 kg)   11/15/21 119 lb 3.2 oz (54.1 kg)   08/16/21 116 lb 3.2 oz (52.7 kg)     BP Readings from Last 3 Encounters:   12/14/21 (!) 152/80   11/15/21 (!) 142/88   08/16/21 (!) 183/70       Nursing note and vitals reviewed. Physical Exam   Constitutional: Oriented to person, place, and time. Appears well-developed and well-nourished. HENT:   Head: Normocephalic and atraumatic. Eyes: EOM are normal. Pupils are equal, round, and reactive to light. Neck: Normal range of motion. Neck supple. No JVD present.    Cardiovascular: Normal rate, regular rhythm, normal heart sounds and intact distal pulses. No murmur heard. Pulmonary/Chest: Effort normal and breath sounds normal. No respiratory distress. No wheezes. No rales. Abdominal: Soft. Bowel sounds are normal. No distension. There is no tenderness. Musculoskeletal: Normal range of motion. No edema. Neurological: Alert and oriented to person, place, and time. No cranial nerve deficit. Coordination normal.   Skin: Skin is warm and dry. Psychiatric: Normal mood and affect.        No results found for: CKTOTAL, CKMB, CKMBINDEX    Lab Results   Component Value Date    WBC 8.0 12/06/2021    RBC 3.70 12/06/2021    HGB 11.4 12/06/2021    HCT 35.7 12/06/2021    MCV 96.5 12/06/2021    MCH 30.8 12/06/2021    MCHC 31.9 12/06/2021    RDW 13.0 01/17/2013     12/06/2021    MPV 11.3 12/06/2021       Lab Results   Component Value Date     12/06/2021    K 4.4 12/06/2021    K 4.9 12/25/2020     12/06/2021    CO2 23 12/06/2021    BUN 11 12/06/2021    LABALBU 3.6 12/25/2020    CREATININE 1.0 12/06/2021    CALCIUM 10.5 12/06/2021    LABGLOM 52 12/06/2021    GLUCOSE 93 12/06/2021    GLUCOSE 91 05/19/2021       Lab Results   Component Value Date    ALKPHOS 52 12/25/2020    ALT 18 12/25/2020    AST 26 12/25/2020    PROT 5.2 12/25/2020    BILITOT 0.2 12/25/2020    BILIDIR <0.2 12/18/2020    LABALBU 3.6 12/25/2020       Lab Results   Component Value Date    MG 1.8 05/19/2021       Lab Results   Component Value Date    INR 1.13 12/20/2020    INR 1.06 12/18/2020    INR 0.99 12/18/2020         No results found for: LABA1C    Lab Results   Component Value Date    TRIG 56 01/17/2013    HDL 91 01/17/2013    HDL 68 01/11/2012    LDLCALC 106 01/17/2013       Lab Results   Component Value Date    TSH 2.210 12/19/2020         Testing Reviewed:      I have individually reviewed the cardiac test below:    ECHO: Results for orders placed during the hospital encounter of 12/06/21    ECHO Complete 2D W Doppler W Color    Narrative  Transthoracic Echocardiography Report (TTE)    Demographics    Patient Name   Anastacia Chambers  Gender              Female  P    MR #           [id number]       Race                    Ethnicity    Account #      [de-identified]       Room Number    Accession      [id number]      Date of Study       12/06/2021  Number    Date of Birth  09/25/1931      Referring Physician Jonathan Price MD    Age            80 year(s)      Carmen Armstrong,  RVT    Interpreting        Sophy Price MD  Physician    Procedure    Type of Study    TTE procedure:ECHOCARDIOGRAM COMPLETE 2D W DOPPLER W COLOR. Procedure Date  Date: 12/06/2021 Start: 11:11 AM    Study Location: Echo Lab  Technical Quality: Limited visualization due to poor acoustical window. Indications:Post TAVR 1 year. Additional Medical History:hyperlipidemia, hypertension    Patient Status: Routine    Height: 60 inches Weight: 119 pounds BSA: 1.5 m^2 BMI: 23.24 kg/m^2    BP: 142/88 mmHg    Allergies  - No Known Allergies. - Tomatoes and tomato products. Conclusions    Summary  Normal left ventricular size and systolic function. There were no regional wall motion abnormalities. Wall thickness was within normal limits. Ejection fraction was estimated at 60-65%. Doppler parameters were consistent with abnormal left ventricular  relaxation (grade 1 diastolic dysfunction). There was trace mitral regurgitation. S/p TAVR. DOPPLER: Transaortic velocity was within the normal range with  no evidence of aortic stenosis (mean gradient 4, Vmax 1.4 m/s). There was  trace perivalvular aortic regurgitation.     Signature    ----------------------------------------------------------------  Electronically signed by Sophy Price MD (Interpreting  physician) on 12/07/2021 at 05:30 PM  ----------------------------------------------------------------    Findings    Mitral Valve  The mitral valve structure was normal with normal leaflet separation. DOPPLER: The transmitral velocity was within the normal range with no  evidence for mitral stenosis. There was trace mitral regurgitation. Aortic Valve  S/p TAVR. DOPPLER: Transaortic velocity was within the normal range with  no evidence of aortic stenosis (mean gradient 4, Vmax 1.4 m/s). There was  trace perivalvular aortic regurgitation. Tricuspid Valve  The tricuspid valve structure was normal with normal leaflet separation. DOPPLER: There was no evidence of tricuspid stenosis. There was trace  tricuspid regurgitation. Pulmonic Valve  The pulmonic valve leaflets exhibited normal thickness, no calcification,  and normal cuspal separation. DOPPLER: The transpulmonic velocity was  within the normal range with no evidence for regurgitation. Left Atrium  Left atrial size was normal.    Left Ventricle  Normal left ventricular size and systolic function. There were no regional wall motion abnormalities. Wall thickness was within normal limits. Ejection fraction was estimated at 60-65%. Doppler parameters were consistent with abnormal left ventricular  relaxation (grade 1 diastolic dysfunction). Right Atrium  Right atrial size was normal.    Right Ventricle  The right ventricular size was normal with normal systolic function and  wall thickness. Pericardial Effusion  The pericardium was normal in appearance with no evidence of a pericardial  effusion. Pleural Effusion  No evidence of pleural effusion. Aorta / Great Vessels  -Aortic root dimension within normal limits.  -The Pulmonary artery is within normal limits. -IVC size is within normal limits with normal respiratory phasic changes.     M-Mode/2D Measurements & Calculations    LV Diastolic      LV Systolic Dimension: 2.3 cm    LA Dimension: 2.5 cmLA  Dimension: 3.6 cm LV Volume Diastolic: 53.0 ml     Area: 13.6 cm^2  LV FS:36.1 %      LV Volume Systolic: 95.6 ml  LV PW Diastolic:  LV EDV/LV EDV Index: 46.7 ml/31  1.2 cm            m^2LV ESV/LV ESV Index: 03.1  Septum Diastolic: ml/8 m^2                         RV Diastolic Dimension:  0.8 cm            EF Calculated: 73.9 %            2.9 cm    LA volume/Index: 33 ml  /22m^2    Doppler Measurements & Calculations    MV Peak E-Wave: 97.4 AV Peak Velocity: 142 LVOT Peak Velocity: 88.3 cm/s  cm/s                 cm/s                  LVOT Mean Velocity: 67.1 cm/s  MV Peak A-Wave: 117  AV Peak Gradient:     LVOT Peak Gradient: 3 mmHgLVOT  cm/s                 8.07 mmHg             Mean Gradient: 2 mmHg  MV E/A Ratio: 0.83   AV Mean Velocity:  MV Peak Gradient:    99.2 cm/s             TV Peak E-Wave: 44.1 cm/s  3.79 mmHg            AV Mean Gradient: 4   TV Peak A-Wave: 33 cm/s  mmHg  MV Deceleration      AV VTI: 32.7 cm       TV Peak Gradient: 0.78 mmHg  Time: 236 msec                             TR Velocity:204 cm/s  MV P1/2t: 69 msec                          TR Gradient:16.65 mmHg  MVA by PHT:3.19 cm^2 LVOT VTI: 23.8 cm     PV Peak Velocity: 56.1 cm/s  IVRT: 77 msec         PV Peak Gradient: 1.26 mmHg  MV E' Septal  Velocity: 6.4 cm/s  MV A' Septal         AV DVI (VTI): 0.73AV  Velocity: 8.4 cm/s   DVI (Vmax):0.62  MV E' Lateral  Velocity: 7 cm/s  MV A' Lateral  Velocity: 7.5 cm/s  E/E' septal: 15.22  E/E' lateral: 13.91    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 She wants to stop. Will d/c. Add Norvasc 5 mg q day, The patient was advised on risk/benefits of the new Rx and she agreed to proceed with the medication(s). TTE shows no issues with the valve. D/c Plavix as well.   Discussed diet/exercise/BP/weight loss/health lifestyle choices/lipids; the patient understands the goals and will try to comply.     Disposition:  prn         Electronically signed by India Tidwell MD   12/14/2021 at 12:41 PM EST

## 2021-12-15 ENCOUNTER — TELEPHONE (OUTPATIENT)
Dept: CARDIOLOGY CLINIC | Age: 86
End: 2021-12-15

## 2021-12-15 RX ORDER — LOVASTATIN 10 MG/1
10 TABLET ORAL NIGHTLY
COMMUNITY

## 2021-12-15 NOTE — TELEPHONE ENCOUNTER
Patient called into the office states that she taking Lovastatin 10mg qd,  Patient states that after reading her AVS on 12.14.2021 she had noticed that it was not on the list.      Added to med list.

## 2021-12-21 ENCOUNTER — TELEPHONE (OUTPATIENT)
Dept: CARDIOLOGY CLINIC | Age: 86
End: 2021-12-21

## 2021-12-21 NOTE — TELEPHONE ENCOUNTER
[address])  [address]    The following questions refer to your heart failure and how it may affect your life. Please read and complete the following questions. There is no right or wrong answers. Please modesto the answer that best applies to you. 1. Heart failure affects different people in different ways. Some feel shortness of breath while others feel fatigue. Please indicate how much you are limited by heart failure (shortness of breath or fatigue) in your ability to do the following activities over the past 2 weeks. Activity Extremely Limited Quite a bit limited Moderatly Limited Slightly Limited Not at all Limited Limited for other reasons/ did not do the activity   Showering/ Bathing          x    Walking 1 block on Level ground     x    Hurrying or jogging (as if to catch a bus)      x        1         2       3  4       5   6     2. Over the past 2 weeks, how many times did you have swelling in your feet, ankles or legs when you woke up in the morning? Every morning 3 or more times per week, but not every day 1-2 times per week Less than once a week Never over the past 2 weeks         x   `        1          2   3       4                     5           3. Over the past 2 weeks, on average, how many times has fatigue limited your ability to do what you wanted? All of the time Several times per day At least once a day 3 or more times per week 1-2 times per week Less than once a week Never over the past 2 weeks          x             1        2     3  4        5       6  7        4. Over the past 2 weeks, on average, how many times has shortness of breath limited your ability to do what you    wanted? All of the time Several times per day At least once a day 3 or more times per week 1-2 times per week Less than once a week Never over the past 2 weeks           x            1        2     3  4         5       6  7     5.  Over the past 2 weeks, on average, how many times have you been forced to sleep sitting up in a chair or with at least 3 pillows to prop you up because of shortness of breath? Every night 3 or more times per week, but not every day 1-2 times per week Less than once a week Never over the past 2 weeks         x   `        1          2   3       4                     5        6.  Over the past 2 weeks, how much has your heart failure limited your enjoyment of life? It has extremely limited my enjoyment of life It has limited my enjoyment of life quite a bit It has moderately limited my enjoyment of life It has slightly limited my enjoyment of life It has not limited my enjoyment of life        x          1          2   3       4           5     7.  If you had to spend the rest of your life with your heart failure the way it is right now, how would you feel about this? Not at all satisfied Mostly dissatisfied Somewhat satisfied Mostly satisfied Completely satisfied        x               1                         2                            3                        4                            5        8. How much does your heart failure affect your lifestyle?  Please indicate how your heart failure may have limited your participation in the following activities over the past 2 weeks    Activity Severely limited Limited quite a bit Moderately limited Slightly limited Did not limit at all Does not apply or did not do this activity   Hobbies, recreational activities    x     Working or doing household chores    x     Visiting family or friends out of your home      x               1      2   3         4       5            6             Meter Walk Test    Time 1: 10 seconds  Time 2: 10 seconds  Time 3: 11 seconds

## 2022-01-05 RX ORDER — AMLODIPINE BESYLATE 5 MG/1
5 TABLET ORAL DAILY
Qty: 90 TABLET | Refills: 0 | Status: SHIPPED | OUTPATIENT
Start: 2022-01-05 | End: 2022-03-22

## 2022-02-15 ENCOUNTER — TELEPHONE (OUTPATIENT)
Dept: CARDIOLOGY CLINIC | Age: 87
End: 2022-02-15

## 2022-02-15 NOTE — TELEPHONE ENCOUNTER
Williemae Alpers called in sting that she has a rash on her back that has lasted over a week now and contiues get worse. She believes it is due to the Amlodipine Besylate. In 2017, she had the same issue and switched to Amlodipine Benazepril. After this switch the rash resolved. She questions if the Amlodipine can be switch to Amlodipine Benazepril? If the switch can be made please sent the prescription to [facility] [address].

## 2022-02-16 NOTE — TELEPHONE ENCOUNTER
Patient called in stating that her rash has went away without stopping the Amlodipine. She would like to just stay on her regimen for now and will call if things change.

## 2022-02-16 NOTE — TELEPHONE ENCOUNTER
On all my previous note pat was not on norvasc  Pat was on lisinopril 10 po qd  Someone send to me to sign norvasc on jan 5. Why how ???- appreciate if someone able to clarify for me     Recommend   Stop norvasc see if rash get better.  If not get better need to see dermatology or pcp  Start back lisinopril 10 mg po qd ( not in the list now, but on the last in most recent office visit)  Benazpril or lsinopril- BOth are ACEI

## 2022-02-16 NOTE — TELEPHONE ENCOUNTER
Per Dr Jg Tariq 1 Year TAVR follow up note from 12/14/21:     Has been having some dizziness with Lisinopril. She notes that when she stops it, she is not dizzy. She is taking all other meds. Doing well, but has some lightheadedness with dizziness. She wants to stop. Will d/c. Add Norvasc 5 mg q day, The patient was advised on risk/benefits of the new Rx and she agreed to proceed with the medication(s).

## 2022-03-22 RX ORDER — AMLODIPINE BESYLATE 5 MG/1
TABLET ORAL
Qty: 90 TABLET | Refills: 0 | Status: SHIPPED | OUTPATIENT
Start: 2022-03-22

## 2022-06-20 RX ORDER — AMLODIPINE BESYLATE 5 MG/1
TABLET ORAL
Qty: 90 TABLET | Refills: 3 | OUTPATIENT
Start: 2022-06-20

## 2023-03-22 ENCOUNTER — HOSPITAL ENCOUNTER (EMERGENCY)
Age: 88
Discharge: HOME | End: 2023-03-22
Payer: MEDICARE

## 2023-03-22 VITALS
SYSTOLIC BLOOD PRESSURE: 147 MMHG | DIASTOLIC BLOOD PRESSURE: 88 MMHG | HEART RATE: 68 BPM | OXYGEN SATURATION: 99 % | RESPIRATION RATE: 18 BRPM | TEMPERATURE: 97.3 F

## 2023-03-22 DIAGNOSIS — B02.8 HERPES ZOSTER WITH OTHER COMPLICATION: Primary | ICD-10-CM

## 2023-03-22 PROCEDURE — 99213 OFFICE O/P EST LOW 20 MIN: CPT

## 2023-03-22 PROCEDURE — 99213 OFFICE O/P EST LOW 20 MIN: CPT | Performed by: NURSE PRACTITIONER

## 2023-03-22 PROCEDURE — 2500000003 HC RX 250 WO HCPCS: Performed by: NURSE PRACTITIONER

## 2023-03-22 RX ORDER — PROPARACAINE HYDROCHLORIDE 5 MG/ML
2 SOLUTION/ DROPS OPHTHALMIC ONCE
Status: COMPLETED | OUTPATIENT
Start: 2023-03-22 | End: 2023-03-22

## 2023-03-22 RX ORDER — ACYCLOVIR 800 MG/1
800 TABLET ORAL
Qty: 35 TABLET | Refills: 0 | Status: SHIPPED | OUTPATIENT
Start: 2023-03-22 | End: 2023-03-29

## 2023-03-22 RX ADMIN — PROPARACAINE HYDROCHLORIDE 2 DROP: 5 SOLUTION/ DROPS OPHTHALMIC at 12:30

## 2023-03-22 ASSESSMENT — PAIN - FUNCTIONAL ASSESSMENT: PAIN_FUNCTIONAL_ASSESSMENT: NONE - DENIES PAIN

## 2023-03-22 ASSESSMENT — ENCOUNTER SYMPTOMS
SORE THROAT: 0
SINUS PAIN: 0
EYES NEGATIVE: 1
DIARRHEA: 0
ABDOMINAL PAIN: 0
VOMITING: 0
CONSTIPATION: 0
SINUS PRESSURE: 0
NAUSEA: 0
RHINORRHEA: 0
WHEEZING: 0
SHORTNESS OF BREATH: 0

## 2023-03-22 NOTE — ED NOTES
Patient discharge instructions given to pt and pt verbalized understanding, 1 px given, no other needs at this time, and pt left in stable condition, appointment confirmed for 2:30 today.      Thais King RN  03/22/23 [id number]

## 2023-03-22 NOTE — ED PROVIDER NOTES
Mouth/Throat:      Mouth: Mucous membranes are moist.      Pharynx: Oropharynx is clear. Eyes:      Extraocular Movements: Extraocular movements intact. Conjunctiva/sclera: Conjunctivae normal.      Pupils: Pupils are equal, round, and reactive to light. Comments: There is no obvious dendritic lesion to the right eye and there is no fluorescein reuptake noted. However due to the proximity of the vesicular lesions to the eyelid with swelling I do recommend close ophthalmology follow-up. Cardiovascular:      Rate and Rhythm: Normal rate and regular rhythm. Pulses: Normal pulses. Heart sounds: Normal heart sounds. No murmur heard. Pulmonary:      Effort: Pulmonary effort is normal. No respiratory distress. Breath sounds: Normal breath sounds. No wheezing. Abdominal:      General: Abdomen is flat. Palpations: Abdomen is soft. Tenderness: There is no abdominal tenderness. Musculoskeletal:         General: No swelling or deformity. Normal range of motion. Cervical back: Normal range of motion and neck supple. Skin:     General: Skin is warm and dry. Capillary Refill: Capillary refill takes less than 2 seconds. Findings: No rash. Comments: Vesicular rash present to the right side of face. Some areas are scabbed. Consistent with shingles. Neurological:      General: No focal deficit present. Mental Status: She is alert and oriented to person, place, and time. Mental status is at baseline. Psychiatric:         Mood and Affect: Mood normal.         Behavior: Behavior normal.         Thought Content: Thought content normal.         Judgment: Judgment normal.       DIAGNOSTIC RESULTS   Labs:No results found for this visit on 03/22/23.     IMAGING:  No orders to display     URGENT CARE COURSE:         Medications   proparacaine (ALCAINE) 0.5 % ophthalmic solution 2 drop (2 drops Right Eye Given 3/22/23 1230)     PROCEDURES:  FINALIMPRESSION      1.

## 2023-07-11 ENCOUNTER — HOSPITAL ENCOUNTER (EMERGENCY)
Age: 88
Discharge: HOME | End: 2023-07-11
Payer: MEDICARE

## 2023-07-11 VITALS
RESPIRATION RATE: 16 BRPM | SYSTOLIC BLOOD PRESSURE: 146 MMHG | TEMPERATURE: 98.6 F | OXYGEN SATURATION: 99 % | HEART RATE: 65 BPM | DIASTOLIC BLOOD PRESSURE: 67 MMHG

## 2023-07-11 DIAGNOSIS — B02.9 HERPES ZOSTER WITHOUT COMPLICATION: Primary | ICD-10-CM

## 2023-07-11 PROCEDURE — 99213 OFFICE O/P EST LOW 20 MIN: CPT

## 2023-07-11 PROCEDURE — 99213 OFFICE O/P EST LOW 20 MIN: CPT | Performed by: NURSE PRACTITIONER

## 2023-07-11 RX ORDER — VALACYCLOVIR HYDROCHLORIDE 1 G/1
1000 TABLET, FILM COATED ORAL 3 TIMES DAILY
Qty: 21 TABLET | Refills: 0 | Status: SHIPPED | OUTPATIENT
Start: 2023-07-11 | End: 2023-07-18

## 2023-07-11 ASSESSMENT — ENCOUNTER SYMPTOMS: SHORTNESS OF BREATH: 0

## 2023-07-11 NOTE — ED PROVIDER NOTES
[address]  Urgent Care Encounter       CHIEF COMPLAINT       Chief Complaint   Patient presents with    Urticaria      Onset 1 week  left upper arm and  left ankle  seeping       Nurses Notes reviewed and I agree except as noted in the HPI. HISTORY OF PRESENT ILLNESS   Rito Jones is a 80 y.o. female who presents with rash like blisters to her left upper arm, leg, and ankle. She reports blister development 1 week ago with fluid leaking. Denies any fevers. Denies any pain at the site. Does have a history of shingles 4 months ago. Blisters continue to leak which she has applied Band-Aids. The history is provided by the patient. REVIEW OF SYSTEMS     Review of Systems   Constitutional:  Negative for chills and fever. Respiratory:  Negative for shortness of breath. Cardiovascular:  Negative for chest pain. Musculoskeletal:  Negative for arthralgias and myalgias. Skin:  Positive for rash (left upper arm, left leg, and left ankle). Neurological:  Negative for weakness, numbness and headaches. PAST MEDICAL HISTORY         Diagnosis Date    Hyperlipidemia     Hypertension     Osteoporosis     Pneumonia        SURGICALHISTORY     Patient  has a past surgical history that includes Elbow surgery; Tonsillectomy; Carpal tunnel release (Right); and Anomalous venous return repair. CURRENT MEDICATIONS       Previous Medications    ACETAMINOPHEN (TYLENOL) 325 MG TABLET    Take 1 tablet by mouth every 6 hours as needed for Pain    AMLODIPINE (NORVASC) 5 MG TABLET    TAKE 1 TABLET DAILY    ASPIRIN 81 MG EC TABLET    Take 81 mg by mouth daily    DICLOFENAC SODIUM (VOLTAREN) 1 % GEL    Apply 2 g topically 3 times daily as needed for Pain (left chest wall)    LORATADINE (CLARITIN) 10 MG TABLET    Take 10 mg by mouth daily.       LOVASTATIN (MEVACOR) 10 MG TABLET    Take 10 mg by mouth nightly    METOPROLOL TARTRATE (LOPRESSOR) 25 MG TABLET    Take 1 tablet by mouth 2 times

## 2024-03-27 ENCOUNTER — HOSPITAL ENCOUNTER (EMERGENCY)
Age: 89
Discharge: HOME | End: 2024-03-27
Payer: MEDICARE

## 2024-03-27 VITALS
DIASTOLIC BLOOD PRESSURE: 94 MMHG | TEMPERATURE: 97.8 F | WEIGHT: 122 LBS | OXYGEN SATURATION: 97 % | BODY MASS INDEX: 22.45 KG/M2 | HEIGHT: 62 IN | HEART RATE: 79 BPM | RESPIRATION RATE: 14 BRPM | SYSTOLIC BLOOD PRESSURE: 155 MMHG

## 2024-03-27 DIAGNOSIS — L03.113 CELLULITIS OF RIGHT UPPER EXTREMITY: Primary | ICD-10-CM

## 2024-03-27 PROCEDURE — 99213 OFFICE O/P EST LOW 20 MIN: CPT | Performed by: NURSE PRACTITIONER

## 2024-03-27 PROCEDURE — 99213 OFFICE O/P EST LOW 20 MIN: CPT

## 2024-03-27 RX ORDER — CEPHALEXIN 500 MG/1
500 CAPSULE ORAL 2 TIMES DAILY
Qty: 14 CAPSULE | Refills: 0 | Status: SHIPPED | OUTPATIENT
Start: 2024-03-27 | End: 2024-04-03

## 2024-03-27 ASSESSMENT — ENCOUNTER SYMPTOMS
DIARRHEA: 0
VOMITING: 0
COLOR CHANGE: 1
SHORTNESS OF BREATH: 0
COUGH: 0
RHINORRHEA: 0
NAUSEA: 0
CHEST TIGHTNESS: 0
SORE THROAT: 0

## 2024-03-27 ASSESSMENT — PAIN - FUNCTIONAL ASSESSMENT: PAIN_FUNCTIONAL_ASSESSMENT: NONE - DENIES PAIN

## 2024-03-27 NOTE — ED PROVIDER NOTES
10 MG TABLET    Take 10 mg by mouth daily.      LOVASTATIN (MEVACOR) 10 MG TABLET    Take 1 tablet by mouth nightly    METOPROLOL TARTRATE (LOPRESSOR) 25 MG TABLET    Take 1 tablet by mouth 2 times daily    MULTIPLE VITAMINS-MINERALS (MULTI VITAMIN/MINERALS) TABS    Take  by mouth.      VITAMIN D (ERGOCALCIFEROL) 73778 UNITS CAPS CAPSULE    Take 1 capsule by mouth once a week    ZINC PO    Take 1 tablet by mouth daily       ALLERGIES     Patient is is allergic to tomato.    Patients   There is no immunization history on file for this patient.    FAMILY HISTORY     Patient's family history is not on file.    SOCIAL HISTORY     Patient  reports that she has never smoked. She has never been exposed to tobacco smoke. She has never used smokeless tobacco. She reports that she does not drink alcohol and does not use drugs.    PHYSICAL EXAM     ED TRIAGE VITALS  BP: (!) 155/94, Temp: 97.8 °F (36.6 °C), Pulse: 79, Respirations: 14, SpO2: 97 %,Estimated body mass index is 22.31 kg/m² as calculated from the following:    Height as of this encounter: 1.575 m (5' 2\").    Weight as of this encounter: 55.3 kg (122 lb).,No LMP recorded. Patient is postmenopausal.    Physical Exam  Vitals and nursing note reviewed.   Constitutional:       General: She is not in acute distress.     Appearance: Normal appearance. She is not ill-appearing, toxic-appearing or diaphoretic.   HENT:      Head: Normocephalic.      Right Ear: Ear canal and external ear normal.      Left Ear: Ear canal and external ear normal.      Nose: Nose normal. No congestion or rhinorrhea.      Mouth/Throat:      Mouth: Mucous membranes are moist.      Pharynx: Oropharynx is clear. No oropharyngeal exudate or posterior oropharyngeal erythema.   Cardiovascular:      Rate and Rhythm: Normal rate.      Pulses: Normal pulses.   Pulmonary:      Effort: Pulmonary effort is normal. No respiratory distress.      Breath sounds: No stridor. No wheezing or rhonchi.   Abdominal:  edit the dictations but occasionally words are mis-transcribed.)           Zaire Alexis, APRN - CNP  03/27/24 [id number]

## 2024-06-02 ENCOUNTER — APPOINTMENT (OUTPATIENT)
Dept: CT IMAGING | Age: 89
DRG: 543 | End: 2024-06-02
Payer: MEDICARE

## 2024-06-02 ENCOUNTER — HOSPITAL ENCOUNTER (INPATIENT)
Age: 89
LOS: 8 days | Discharge: NURSING, REHAB OR LONG-TERM CARE | DRG: 543 | End: 2024-06-10
Attending: STUDENT IN AN ORGANIZED HEALTH CARE EDUCATION/TRAINING PROGRAM | Admitting: STUDENT IN AN ORGANIZED HEALTH CARE EDUCATION/TRAINING PROGRAM
Payer: MEDICARE

## 2024-06-02 DIAGNOSIS — S32.10XA CLOSED FRACTURE OF SACRUM, UNSPECIFIED PORTION OF SACRUM, INITIAL ENCOUNTER (HCC): Primary | ICD-10-CM

## 2024-06-02 PROBLEM — R52 INTRACTABLE PAIN: Status: ACTIVE | Noted: 2024-06-02

## 2024-06-02 PROCEDURE — 1200000000 HC SEMI PRIVATE

## 2024-06-02 PROCEDURE — 99223 1ST HOSP IP/OBS HIGH 75: CPT | Performed by: NURSE PRACTITIONER

## 2024-06-02 PROCEDURE — 72131 CT LUMBAR SPINE W/O DYE: CPT

## 2024-06-02 PROCEDURE — 6370000000 HC RX 637 (ALT 250 FOR IP): Performed by: STUDENT IN AN ORGANIZED HEALTH CARE EDUCATION/TRAINING PROGRAM

## 2024-06-02 PROCEDURE — 99285 EMERGENCY DEPT VISIT HI MDM: CPT

## 2024-06-02 PROCEDURE — 72192 CT PELVIS W/O DYE: CPT

## 2024-06-02 PROCEDURE — 6820000001 HC L2 TRAUMA SURGERY EVALUATION: Performed by: ORTHOPAEDIC SURGERY

## 2024-06-02 RX ORDER — HYDROCODONE BITARTRATE AND ACETAMINOPHEN 5; 325 MG/1; MG/1
1 TABLET ORAL ONCE
Status: COMPLETED | OUTPATIENT
Start: 2024-06-02 | End: 2024-06-02

## 2024-06-02 RX ADMIN — HYDROCODONE BITARTRATE AND ACETAMINOPHEN 1 TABLET: 5; 325 TABLET ORAL at 20:24

## 2024-06-02 ASSESSMENT — PAIN SCALES - GENERAL
PAINLEVEL_OUTOF10: 7
PAINLEVEL_OUTOF10: 10
PAINLEVEL_OUTOF10: 7
PAINLEVEL_OUTOF10: 10
PAINLEVEL_OUTOF10: 7
PAINLEVEL_OUTOF10: 10

## 2024-06-02 ASSESSMENT — PAIN - FUNCTIONAL ASSESSMENT: PAIN_FUNCTIONAL_ASSESSMENT: 0-10

## 2024-06-02 NOTE — ED NOTES
Pt requesting external catheter at this time as she states she need to use restroom but does not feel that she can walk. External catheter initiated by this nurse.

## 2024-06-02 NOTE — ED TRIAGE NOTES
Pt arrives to ED from home for c/o lower back pain. Pt states she fell early last week and injured her lower back/coccyx. Pt states she was seen at [facility] and had x-rays done but is unsure of the results. [facility] gave pt Tramadol for pain but she states it is not helping.

## 2024-06-03 ENCOUNTER — APPOINTMENT (OUTPATIENT)
Dept: MRI IMAGING | Age: 89
DRG: 543 | End: 2024-06-03
Payer: MEDICARE

## 2024-06-03 PROBLEM — D64.9 NORMOCYTIC ANEMIA: Status: ACTIVE | Noted: 2024-06-03

## 2024-06-03 PROBLEM — I50.32 CHRONIC HEART FAILURE WITH PRESERVED EJECTION FRACTION (HCC): Status: ACTIVE | Noted: 2024-06-03

## 2024-06-03 PROBLEM — K59.00 CONSTIPATION: Status: ACTIVE | Noted: 2024-06-03

## 2024-06-03 PROBLEM — S32.10XA SACRAL FRACTURE (HCC): Status: ACTIVE | Noted: 2024-06-03

## 2024-06-03 LAB
ALBUMIN SERPL BCG-MCNC: 3.6 G/DL (ref 3.5–5.1)
ALP SERPL-CCNC: 81 U/L (ref 38–126)
ALT SERPL W/O P-5'-P-CCNC: 10 U/L (ref 11–66)
ANION GAP SERPL CALC-SCNC: 12 MEQ/L (ref 8–16)
AST SERPL-CCNC: 15 U/L (ref 5–40)
BASOPHILS ABSOLUTE: 0.1 THOU/MM3 (ref 0–0.1)
BASOPHILS NFR BLD AUTO: 1 %
BILIRUB SERPL-MCNC: 0.2 MG/DL (ref 0.3–1.2)
BILIRUB UR QL STRIP: NEGATIVE
BUN SERPL-MCNC: 18 MG/DL (ref 7–22)
CALCIUM SERPL-MCNC: 10 MG/DL (ref 8.5–10.5)
CHARACTER UR: CLEAR
CHLORIDE SERPL-SCNC: 105 MEQ/L (ref 98–111)
CO2 SERPL-SCNC: 22 MEQ/L (ref 23–33)
COLOR UR: YELLOW
CREAT SERPL-MCNC: 0.7 MG/DL (ref 0.4–1.2)
CRP SERPL-MCNC: 3.04 MG/DL (ref 0–1)
DEPRECATED RDW RBC AUTO: 47.3 FL (ref 35–45)
EKG ATRIAL RATE: 85 BPM
EKG P AXIS: 62 DEGREES
EKG P-R INTERVAL: 180 MS
EKG Q-T INTERVAL: 334 MS
EKG QRS DURATION: 78 MS
EKG QTC CALCULATION (BAZETT): 397 MS
EKG R AXIS: -5 DEGREES
EKG T AXIS: 58 DEGREES
EKG VENTRICULAR RATE: 85 BPM
EOSINOPHIL NFR BLD AUTO: 4.6 %
EOSINOPHILS ABSOLUTE: 0.3 THOU/MM3 (ref 0–0.4)
ERYTHROCYTE [DISTWIDTH] IN BLOOD BY AUTOMATED COUNT: 13.3 % (ref 11.5–14.5)
ERYTHROCYTE [SEDIMENTATION RATE] IN BLOOD BY WESTERGREN METHOD: 50 MM/HR (ref 0–20)
GFR SERPL CREATININE-BSD FRML MDRD: 81 ML/MIN/1.73M2
GLUCOSE SERPL-MCNC: 82 MG/DL (ref 70–108)
GLUCOSE UR QL STRIP.AUTO: NEGATIVE MG/DL
HCT VFR BLD AUTO: 31.1 % (ref 37–47)
HGB BLD-MCNC: 10.1 GM/DL (ref 12–16)
HGB UR QL STRIP.AUTO: NEGATIVE
IMM GRANULOCYTES # BLD AUTO: 0.01 THOU/MM3 (ref 0–0.07)
IMM GRANULOCYTES NFR BLD AUTO: 0.2 %
KETONES UR QL STRIP.AUTO: ABNORMAL
LEUKOCYTE ESTERASE UR QL STRIP.AUTO: NEGATIVE
LYMPHOCYTES ABSOLUTE: 2.1 THOU/MM3 (ref 1–4.8)
LYMPHOCYTES NFR BLD AUTO: 36 %
MCH RBC QN AUTO: 31.3 PG (ref 26–33)
MCHC RBC AUTO-ENTMCNC: 32.5 GM/DL (ref 32.2–35.5)
MCV RBC AUTO: 96.3 FL (ref 81–99)
MONOCYTES ABSOLUTE: 0.6 THOU/MM3 (ref 0.4–1.3)
MONOCYTES NFR BLD AUTO: 9.5 %
NEUTROPHILS ABSOLUTE: 2.9 THOU/MM3 (ref 1.8–7.7)
NEUTROPHILS NFR BLD AUTO: 48.7 %
NITRITE UR QL STRIP.AUTO: NEGATIVE
NRBC BLD AUTO-RTO: 0 /100 WBC
OSMOLALITY SERPL CALC.SUM OF ELEC: 278.5 MOSMOL/KG (ref 275–300)
PH UR STRIP.AUTO: 5.5 [PH] (ref 5–9)
PLATELET # BLD AUTO: 274 THOU/MM3 (ref 130–400)
PMV BLD AUTO: 10.5 FL (ref 9.4–12.4)
POTASSIUM SERPL-SCNC: 4.1 MEQ/L (ref 3.5–5.2)
PROT SERPL-MCNC: 6.1 G/DL (ref 6.1–8)
PROT UR STRIP.AUTO-MCNC: NEGATIVE MG/DL
RBC # BLD AUTO: 3.23 MILL/MM3 (ref 4.2–5.4)
SODIUM SERPL-SCNC: 139 MEQ/L (ref 135–145)
SP GR UR REFRACT.AUTO: 1.01 (ref 1–1.03)
UROBILINOGEN UR QL STRIP.AUTO: 0.2 EU/DL (ref 0–1)
WBC # BLD AUTO: 5.9 THOU/MM3 (ref 4.8–10.8)

## 2024-06-03 PROCEDURE — A9579 GAD-BASE MR CONTRAST NOS,1ML: HCPCS | Performed by: NURSE PRACTITIONER

## 2024-06-03 PROCEDURE — 1200000000 HC SEMI PRIVATE

## 2024-06-03 PROCEDURE — 85651 RBC SED RATE NONAUTOMATED: CPT

## 2024-06-03 PROCEDURE — 85025 COMPLETE CBC W/AUTO DIFF WBC: CPT

## 2024-06-03 PROCEDURE — 6360000004 HC RX CONTRAST MEDICATION: Performed by: NURSE PRACTITIONER

## 2024-06-03 PROCEDURE — 6370000000 HC RX 637 (ALT 250 FOR IP): Performed by: NURSE PRACTITIONER

## 2024-06-03 PROCEDURE — 80053 COMPREHEN METABOLIC PANEL: CPT

## 2024-06-03 PROCEDURE — 36415 COLL VENOUS BLD VENIPUNCTURE: CPT

## 2024-06-03 PROCEDURE — 6360000002 HC RX W HCPCS: Performed by: NURSE PRACTITIONER

## 2024-06-03 PROCEDURE — 2580000003 HC RX 258: Performed by: NURSE PRACTITIONER

## 2024-06-03 PROCEDURE — 81003 URINALYSIS AUTO W/O SCOPE: CPT

## 2024-06-03 PROCEDURE — 93010 ELECTROCARDIOGRAM REPORT: CPT | Performed by: NUCLEAR MEDICINE

## 2024-06-03 PROCEDURE — 72158 MRI LUMBAR SPINE W/O & W/DYE: CPT

## 2024-06-03 PROCEDURE — 99232 SBSQ HOSP IP/OBS MODERATE 35: CPT | Performed by: PHYSICIAN ASSISTANT

## 2024-06-03 PROCEDURE — 86140 C-REACTIVE PROTEIN: CPT

## 2024-06-03 PROCEDURE — 93005 ELECTROCARDIOGRAM TRACING: CPT | Performed by: NURSE PRACTITIONER

## 2024-06-03 PROCEDURE — 72197 MRI PELVIS W/O & W/DYE: CPT

## 2024-06-03 RX ORDER — HYDROCODONE BITARTRATE AND ACETAMINOPHEN 5; 325 MG/1; MG/1
2 TABLET ORAL EVERY 4 HOURS PRN
Status: DISCONTINUED | OUTPATIENT
Start: 2024-06-03 | End: 2024-06-10 | Stop reason: HOSPADM

## 2024-06-03 RX ORDER — ACETAMINOPHEN 650 MG/1
650 SUPPOSITORY RECTAL EVERY 6 HOURS PRN
Status: DISCONTINUED | OUTPATIENT
Start: 2024-06-03 | End: 2024-06-10 | Stop reason: HOSPADM

## 2024-06-03 RX ORDER — ONDANSETRON 2 MG/ML
4 INJECTION INTRAMUSCULAR; INTRAVENOUS EVERY 6 HOURS PRN
Status: DISCONTINUED | OUTPATIENT
Start: 2024-06-03 | End: 2024-06-10 | Stop reason: HOSPADM

## 2024-06-03 RX ORDER — MAGNESIUM SULFATE IN WATER 40 MG/ML
2000 INJECTION, SOLUTION INTRAVENOUS PRN
Status: DISCONTINUED | OUTPATIENT
Start: 2024-06-03 | End: 2024-06-10 | Stop reason: HOSPADM

## 2024-06-03 RX ORDER — SODIUM CHLORIDE 9 MG/ML
INJECTION, SOLUTION INTRAVENOUS PRN
Status: DISCONTINUED | OUTPATIENT
Start: 2024-06-03 | End: 2024-06-10 | Stop reason: HOSPADM

## 2024-06-03 RX ORDER — ACETAMINOPHEN 325 MG/1
650 TABLET ORAL EVERY 6 HOURS PRN
Status: DISCONTINUED | OUTPATIENT
Start: 2024-06-03 | End: 2024-06-10 | Stop reason: HOSPADM

## 2024-06-03 RX ORDER — ASPIRIN 81 MG/1
81 TABLET ORAL DAILY
Status: DISCONTINUED | OUTPATIENT
Start: 2024-06-03 | End: 2024-06-10 | Stop reason: HOSPADM

## 2024-06-03 RX ORDER — ONDANSETRON 4 MG/1
4 TABLET, ORALLY DISINTEGRATING ORAL EVERY 8 HOURS PRN
Status: DISCONTINUED | OUTPATIENT
Start: 2024-06-03 | End: 2024-06-10 | Stop reason: HOSPADM

## 2024-06-03 RX ORDER — SODIUM CHLORIDE 0.9 % (FLUSH) 0.9 %
5-40 SYRINGE (ML) INJECTION PRN
Status: DISCONTINUED | OUTPATIENT
Start: 2024-06-03 | End: 2024-06-10 | Stop reason: HOSPADM

## 2024-06-03 RX ORDER — POLYETHYLENE GLYCOL 3350 17 G/17G
17 POWDER, FOR SOLUTION ORAL DAILY PRN
Status: DISCONTINUED | OUTPATIENT
Start: 2024-06-03 | End: 2024-06-10 | Stop reason: HOSPADM

## 2024-06-03 RX ORDER — ATORVASTATIN CALCIUM 10 MG/1
10 TABLET, FILM COATED ORAL DAILY
Status: DISCONTINUED | OUTPATIENT
Start: 2024-06-03 | End: 2024-06-10 | Stop reason: HOSPADM

## 2024-06-03 RX ORDER — LANOLIN ALCOHOL/MO/W.PET/CERES
3 CREAM (GRAM) TOPICAL NIGHTLY PRN
Status: DISCONTINUED | OUTPATIENT
Start: 2024-06-03 | End: 2024-06-10 | Stop reason: HOSPADM

## 2024-06-03 RX ORDER — ENOXAPARIN SODIUM 100 MG/ML
40 INJECTION SUBCUTANEOUS DAILY
Status: DISCONTINUED | OUTPATIENT
Start: 2024-06-03 | End: 2024-06-10 | Stop reason: HOSPADM

## 2024-06-03 RX ORDER — HYDROCODONE BITARTRATE AND ACETAMINOPHEN 5; 325 MG/1; MG/1
1 TABLET ORAL EVERY 4 HOURS PRN
Status: DISCONTINUED | OUTPATIENT
Start: 2024-06-03 | End: 2024-06-10 | Stop reason: HOSPADM

## 2024-06-03 RX ORDER — SODIUM CHLORIDE 0.9 % (FLUSH) 0.9 %
5-40 SYRINGE (ML) INJECTION EVERY 12 HOURS SCHEDULED
Status: DISCONTINUED | OUTPATIENT
Start: 2024-06-03 | End: 2024-06-10 | Stop reason: HOSPADM

## 2024-06-03 RX ORDER — AMLODIPINE BESYLATE 5 MG/1
5 TABLET ORAL DAILY
Status: DISCONTINUED | OUTPATIENT
Start: 2024-06-03 | End: 2024-06-10 | Stop reason: HOSPADM

## 2024-06-03 RX ORDER — POTASSIUM CHLORIDE 7.45 MG/ML
10 INJECTION INTRAVENOUS PRN
Status: DISCONTINUED | OUTPATIENT
Start: 2024-06-03 | End: 2024-06-10 | Stop reason: HOSPADM

## 2024-06-03 RX ORDER — POTASSIUM CHLORIDE 20 MEQ/1
40 TABLET, EXTENDED RELEASE ORAL PRN
Status: DISCONTINUED | OUTPATIENT
Start: 2024-06-03 | End: 2024-06-10 | Stop reason: HOSPADM

## 2024-06-03 RX ORDER — DOCUSATE SODIUM 100 MG/1
100 CAPSULE, LIQUID FILLED ORAL 2 TIMES DAILY PRN
Status: DISCONTINUED | OUTPATIENT
Start: 2024-06-03 | End: 2024-06-10 | Stop reason: HOSPADM

## 2024-06-03 RX ORDER — SENNOSIDES A AND B 8.6 MG/1
1 TABLET, FILM COATED ORAL NIGHTLY
Status: DISCONTINUED | OUTPATIENT
Start: 2024-06-03 | End: 2024-06-10 | Stop reason: HOSPADM

## 2024-06-03 RX ADMIN — Medication 3 MG: at 20:38

## 2024-06-03 RX ADMIN — SODIUM CHLORIDE, PRESERVATIVE FREE 10 ML: 5 INJECTION INTRAVENOUS at 20:38

## 2024-06-03 RX ADMIN — HYDROCODONE BITARTRATE AND ACETAMINOPHEN 1 TABLET: 5; 325 TABLET ORAL at 18:29

## 2024-06-03 RX ADMIN — SODIUM CHLORIDE, PRESERVATIVE FREE 5 ML: 5 INJECTION INTRAVENOUS at 08:46

## 2024-06-03 RX ADMIN — AMLODIPINE BESYLATE 5 MG: 5 TABLET ORAL at 08:45

## 2024-06-03 RX ADMIN — ENOXAPARIN SODIUM 40 MG: 100 INJECTION SUBCUTANEOUS at 09:04

## 2024-06-03 RX ADMIN — HYDROCODONE BITARTRATE AND ACETAMINOPHEN 2 TABLET: 5; 325 TABLET ORAL at 00:53

## 2024-06-03 RX ADMIN — GADOTERIDOL 15 ML: 279.3 INJECTION, SOLUTION INTRAVENOUS at 08:15

## 2024-06-03 RX ADMIN — SENNOSIDES 8.6 MG: 8.6 TABLET, FILM COATED ORAL at 20:38

## 2024-06-03 RX ADMIN — HYDROCODONE BITARTRATE AND ACETAMINOPHEN 2 TABLET: 5; 325 TABLET ORAL at 09:04

## 2024-06-03 RX ADMIN — METOPROLOL TARTRATE 25 MG: 50 TABLET, FILM COATED ORAL at 08:44

## 2024-06-03 RX ADMIN — ASPIRIN 81 MG: 81 TABLET, COATED ORAL at 08:45

## 2024-06-03 RX ADMIN — DOCUSATE SODIUM 100 MG: 100 CAPSULE, LIQUID FILLED ORAL at 20:38

## 2024-06-03 RX ADMIN — ATORVASTATIN CALCIUM 10 MG: 10 TABLET, FILM COATED ORAL at 08:46

## 2024-06-03 RX ADMIN — METOPROLOL TARTRATE 25 MG: 50 TABLET, FILM COATED ORAL at 20:38

## 2024-06-03 ASSESSMENT — PAIN SCALES - GENERAL
PAINLEVEL_OUTOF10: 0
PAINLEVEL_OUTOF10: 7
PAINLEVEL_OUTOF10: 5
PAINLEVEL_OUTOF10: 10
PAINLEVEL_OUTOF10: 9

## 2024-06-03 ASSESSMENT — PAIN DESCRIPTION - LOCATION: LOCATION: SACRUM

## 2024-06-03 NOTE — ED NOTES
Patient resting in bed with eyes closed, respirations are even and unlabored. Awakens easily to verbal stimuli. Call light in reach. Bed alarm on .

## 2024-06-03 NOTE — ED NOTES
Pt in bed talking with visitors at bedside. Updated on plan of care. Voiced no further needs. Call light in reach.

## 2024-06-03 NOTE — ED NOTES
Pt in bed watching tv at this time.  Updated on plan of care. Voiced no needs. Call light in reach.

## 2024-06-03 NOTE — H&P
2020 ASA,Lopressor continued.   TAVR 2021 history  HFpEF: History ECHO 2021 LVEF 60-65%, grade 1 diastolic dysfunction, trace mitral regurgitation  Essential hypertension: History Norvasc and Lopressor have been continued parameters to hold  Dyslipidemia: History Lipitor was continued  Anemia, normocytic: History monitor and trend    Patient Active Problem List   Diagnosis Code    HTN (hypertension) I10    Hyperlipidemia E78.5    Osteoporosis M81.0    Allergic rhinitis J30.9    IBS (irritable bowel syndrome) K58.9    Osteoarthrosis involving multiple sites M15.9    Medication monitoring encounter Z51.81    Postmenopausal bone loss M81.0    Anemia D64.9    Right hand weakness, atrophy of thumb muscle. R29.898    SOB (shortness of breath) on exertion R06.02    Chronic congestive heart failure (HCC) I50.9    Systolic ejection murmur 5/6 best aortic area R01.1    Pansystolic murmur- best tricuspid area 5/6 R01.1    Severe aortic stenosis I35.0    Cardiomyopathy (HCC) I42.9    S/P CATH - LHC AND RHC- LM-OSTAIL 10% STENOSIS, LAD-PATENT, LCX-P, RCA-P, ; RHC PAP 55/26 MEAN 38 MMHG,PCWP 26 MMHG,- TO BE SCHEDULED FOR TAVR  ASAP WITH  Z98.890    S/P right and left heart catheterization Z98.890    Cardiac arrest (HCC) I46.9    Moderate malnutrition (HCC) E44.0    Hypoxic encephalopathy (HCC) G93.1    S/P TAVR (transcatheter aortic valve replacement) Z95.2    Cardiomyopathy, nonischemic (HCC) I42.8    Chronic diastolic congestive heart failure (HCC) I50.32    Intractable pain R52       Lynn Virgen, APRN - CNP, CNP

## 2024-06-03 NOTE — ED NOTES
Pt in bed watching tv. Pt repositioned for comfort. Voiced no further needs. Updated on plan of care. Call light in reach.

## 2024-06-03 NOTE — CARE COORDINATION
Spoke with patient who advised has outdated ACP documents at home. Would like to complete new ones this admission and have attached to chart. Denied concerns or needs.

## 2024-06-03 NOTE — ED NOTES
Pt woke to answer questions about MRI screen at this time. Pt updated on plan of care. Voiced no needs. Call light in reach.

## 2024-06-03 NOTE — ED NOTES
Pt in bed talking with family at bedside. Warm blanket provided. Updated on plan of care. Voiced no further needs. Call light in reach.

## 2024-06-03 NOTE — ED NOTES
Pt continues to lay in bed and sleep with no s/s of distress noted. Resp easy and even. Call light in reach.

## 2024-06-03 NOTE — ED PROVIDER NOTES
[facility] EMERGENCY DEPT    Pt Name: Sybil Paula  MRN: [id number]  Birthdate 9/25/1931  Date of evaluation: 6/2/2024  Resident Physician: Sussy Ball MD  Supervising Physician: Brandon Olvera DO    CHIEF COMPLAINT       Chief Complaint   Patient presents with    Back Pain     HISTORY OF PRESENT ILLNESS   Sybil Paula is a 89 y.o. female with PMHx of osteoporosis  who presents to the emergency department for evaluation of back pain.    Patient had a fall 2 weeks ago.  Was seen at [facility] and got x-rays done.  Per family numbers at bedside, they stated that they were unsure if there was a fracture or not but patient subsequently was discharged on tramadol.  Patient normally and at baseline does not walk with a walker however since the fall, has needed to ambulate with a walker.  For the past couple days, the pain has been so intense that she has had issues with ambulating even with a walker.    Patient came to ED for further evaluation.  Denies any bowels or bladder dysfunction.  The patient has no other acute complaints at this time.    Review of Systems    Negative Except as Documented Above.    PASTMEDICAL HISTORY     Past Medical History:   Diagnosis Date    Hyperlipidemia     Hypertension     Osteoporosis     Pneumonia        Patient Active Problem List   Diagnosis Code    HTN (hypertension) I10    Hyperlipidemia E78.5    Osteoporosis M81.0    Allergic rhinitis J30.9    IBS (irritable bowel syndrome) K58.9    Osteoarthrosis involving multiple sites M15.9    Medication monitoring encounter Z51.81    Postmenopausal bone loss M81.0    Anemia D64.9    Right hand weakness, atrophy of thumb muscle. R29.898    SOB (shortness of breath) on exertion R06.02    Chronic congestive heart failure (HCC) I50.9    Systolic ejection murmur 5/6 best aortic area R01.1    Pansystolic murmur- best tricuspid area 5/6 R01.1    Severe aortic stenosis I35.0    Cardiomyopathy (HCC) I42.9    S/P CATH - LHC AND RHC- LM-OSTAIL 10%

## 2024-06-03 NOTE — ED NOTES
Patient resting quietly in bed with eyes closed, respirations are even and unlabored. No signs of distress noted. Call light in reach. Bed alarm on.

## 2024-06-03 NOTE — ED NOTES
Patient back from MRI at this time. Updated on plan of care. Denies any needs. Call light in reach. Bed alarm on.

## 2024-06-03 NOTE — ED NOTES
Pt in bed sleeping at this time with no s/s of distress noted. Resp easy and even. Call light in reach.

## 2024-06-04 PROCEDURE — 6360000002 HC RX W HCPCS: Performed by: NURSE PRACTITIONER

## 2024-06-04 PROCEDURE — 92523 SPEECH SOUND LANG COMPREHEN: CPT

## 2024-06-04 PROCEDURE — 1200000000 HC SEMI PRIVATE

## 2024-06-04 PROCEDURE — 6370000000 HC RX 637 (ALT 250 FOR IP): Performed by: NURSE PRACTITIONER

## 2024-06-04 PROCEDURE — L0450 TLSO FLEX TRUNK/THOR PRE OTS: HCPCS

## 2024-06-04 PROCEDURE — 2580000003 HC RX 258: Performed by: NURSE PRACTITIONER

## 2024-06-04 PROCEDURE — 99232 SBSQ HOSP IP/OBS MODERATE 35: CPT | Performed by: PHYSICIAN ASSISTANT

## 2024-06-04 RX ADMIN — ATORVASTATIN CALCIUM 10 MG: 10 TABLET, FILM COATED ORAL at 07:59

## 2024-06-04 RX ADMIN — SODIUM CHLORIDE, PRESERVATIVE FREE 10 ML: 5 INJECTION INTRAVENOUS at 15:11

## 2024-06-04 RX ADMIN — SENNOSIDES 8.6 MG: 8.6 TABLET, FILM COATED ORAL at 20:36

## 2024-06-04 RX ADMIN — METOPROLOL TARTRATE 25 MG: 50 TABLET, FILM COATED ORAL at 20:37

## 2024-06-04 RX ADMIN — DOCUSATE SODIUM 100 MG: 100 CAPSULE, LIQUID FILLED ORAL at 20:36

## 2024-06-04 RX ADMIN — DOCUSATE SODIUM 100 MG: 100 CAPSULE, LIQUID FILLED ORAL at 07:58

## 2024-06-04 RX ADMIN — HYDROCODONE BITARTRATE AND ACETAMINOPHEN 1 TABLET: 5; 325 TABLET ORAL at 07:59

## 2024-06-04 RX ADMIN — AMLODIPINE BESYLATE 5 MG: 5 TABLET ORAL at 07:59

## 2024-06-04 RX ADMIN — ENOXAPARIN SODIUM 40 MG: 100 INJECTION SUBCUTANEOUS at 08:00

## 2024-06-04 RX ADMIN — HYDROCODONE BITARTRATE AND ACETAMINOPHEN 1 TABLET: 5; 325 TABLET ORAL at 15:11

## 2024-06-04 RX ADMIN — HYDROCODONE BITARTRATE AND ACETAMINOPHEN 2 TABLET: 5; 325 TABLET ORAL at 20:36

## 2024-06-04 RX ADMIN — METOPROLOL TARTRATE 25 MG: 50 TABLET, FILM COATED ORAL at 07:59

## 2024-06-04 RX ADMIN — ASPIRIN 81 MG: 81 TABLET, COATED ORAL at 07:58

## 2024-06-04 RX ADMIN — SODIUM CHLORIDE, PRESERVATIVE FREE 10 ML: 5 INJECTION INTRAVENOUS at 20:37

## 2024-06-04 ASSESSMENT — PAIN DESCRIPTION - DESCRIPTORS
DESCRIPTORS: SHARP
DESCRIPTORS: ACHING;SHARP
DESCRIPTORS: ACHING;SHARP

## 2024-06-04 ASSESSMENT — PAIN - FUNCTIONAL ASSESSMENT: PAIN_FUNCTIONAL_ASSESSMENT: PREVENTS OR INTERFERES SOME ACTIVE ACTIVITIES AND ADLS

## 2024-06-04 ASSESSMENT — PAIN SCALES - GENERAL
PAINLEVEL_OUTOF10: 10
PAINLEVEL_OUTOF10: 9
PAINLEVEL_OUTOF10: 9
PAINLEVEL_OUTOF10: 5
PAINLEVEL_OUTOF10: 9

## 2024-06-04 ASSESSMENT — PAIN DESCRIPTION - LOCATION
LOCATION: BACK
LOCATION: SACRUM
LOCATION: SACRUM

## 2024-06-04 ASSESSMENT — PAIN DESCRIPTION - ORIENTATION: ORIENTATION: POSTERIOR;LOWER

## 2024-06-04 NOTE — CARE COORDINATION
Case Management Assessment Initial Evaluation    Date/Time of Evaluation: 2024 7:46 AM  Assessment Completed by: Marya Us RN    If patient is discharged prior to next notation, then this note serves as note for discharge by case management.    Patient Name: Sybil Paula                   YOB: 1931  Diagnosis: Intractable pain [R52]  Closed fracture of sacrum, unspecified portion of sacrum, initial encounter ([facility]) [S32.10XA]                   Date / Time: 2024  7:06 PM  [facility]     Patient Admission Status: Inpatient   Readmission Risk Low 0-14, Mod 15-19), High > 20: Readmission Risk Score: 13.2    Current PCP: Krut Olsen MD    Additional Case Management Notes: Presented for lower back pain. Recent fall 2 weeks ago, received care as OP. Hospitalist following. Ortho spine consult r/t S2 fractures. PT/OT. Pain control. CT pelvis ordered.     Procedures:   none    Imagin/2 CT lumbar spine:   Bilateral sacral alar fractures compatible with trauma noted in the clinical history.  /2 CT lumbar spine:   Bilateral sacral ala fractures.Buckle fracture of the anterior cortex of S2, age indeterminate, but could certainly have occurred due to trauma that created the sacral fractures.  6/3 MRI sacrum:   1.  Abnormal bone marrow signal is seen within the sacrum with fracture lines present reflecting sacral insufficiency fractures.  /3 MRI lumbar spine:    1. Acute fractures of the S2 sacral segment and the sacral ala bilaterally consistent with sacral insufficiency fractures.  2. No fractures in the lumbar spine.  3. Degenerative changes throughout the lumbar spine with significant spinal canal and foraminal stenosis at multiple levels as described.  4. Distended urinary bladder.    Patient Goals/Plan/Treatment Preferences: Spoke with Sybil, she is from home alone. Has a cane and walker that she has been using past week. Await work-up, discharge planning ongoing.

## 2024-06-04 NOTE — CONSULTS
5-40 mL, 5-40 mL, IntraVENous, PRN  0.9 % sodium chloride infusion, , IntraVENous, PRN  potassium chloride (KLOR-CON M) extended release tablet 40 mEq, 40 mEq, Oral, PRN **OR** potassium bicarb-citric acid (EFFER-K) effervescent tablet 40 mEq, 40 mEq, Oral, PRN **OR** potassium chloride 10 mEq/100 mL IVPB (Peripheral Line), 10 mEq, IntraVENous, PRN  magnesium sulfate 2000 mg in 50 mL IVPB premix, 2,000 mg, IntraVENous, PRN  enoxaparin (LOVENOX) injection 40 mg, 40 mg, SubCUTAneous, Daily  ondansetron (ZOFRAN-ODT) disintegrating tablet 4 mg, 4 mg, Oral, Q8H PRN **OR** ondansetron (ZOFRAN) injection 4 mg, 4 mg, IntraVENous, Q6H PRN  polyethylene glycol (GLYCOLAX) packet 17 g, 17 g, Oral, Daily PRN  acetaminophen (TYLENOL) tablet 650 mg, 650 mg, Oral, Q6H PRN **OR** acetaminophen (TYLENOL) suppository 650 mg, 650 mg, Rectal, Q6H PRN  melatonin tablet 3 mg, 3 mg, Oral, Nightly PRN  senna (SENOKOT) tablet 8.6 mg, 1 tablet, Oral, Nightly  docusate sodium (COLACE) capsule 100 mg, 100 mg, Oral, BID PRN  Allergies:  Tomato    Social History:   TOBACCO:   reports that she has never smoked. She has never been exposed to tobacco smoke. She has never used smokeless tobacco.  ETOH:   reports no history of alcohol use.  DRUGS:   reports no history of drug use.  Family History:   History reviewed. No pertinent family history.    REVIEW OF SYSTEMS:    CONSTITUTIONAL:  negative for  fevers, chills  RESPIRATORY:  negative for cough and shortness of breath  CARDIOVASCULAR:  negative for chest pain and palpitations  GASTROINTESTINAL:  negative for nausea, vomiting, bowel incontinence  GENITOURINARY:  negative for frequency and urinary incontinence  MUSCULOSKELETAL:  positive for back pain, negative for leg pain  NEUROLOGICAL:  negative for headaches, syncope  BEHAVIOR/PSYCH:  negative for depressed mood, anxiety    PHYSICAL EXAM:    /60   Pulse 73   Temp 98.1 °F (36.7 °C) (Oral)   Resp 16   Ht 1.549 m (5' 1\")   Wt 59.2 kg

## 2024-06-05 PROCEDURE — 97162 PT EVAL MOD COMPLEX 30 MIN: CPT

## 2024-06-05 PROCEDURE — 2580000003 HC RX 258: Performed by: NURSE PRACTITIONER

## 2024-06-05 PROCEDURE — 97129 THER IVNTJ 1ST 15 MIN: CPT

## 2024-06-05 PROCEDURE — 6370000000 HC RX 637 (ALT 250 FOR IP): Performed by: NURSE PRACTITIONER

## 2024-06-05 PROCEDURE — 97530 THERAPEUTIC ACTIVITIES: CPT

## 2024-06-05 PROCEDURE — 6360000002 HC RX W HCPCS: Performed by: NURSE PRACTITIONER

## 2024-06-05 PROCEDURE — 97130 THER IVNTJ EA ADDL 15 MIN: CPT

## 2024-06-05 PROCEDURE — 51701 INSERT BLADDER CATHETER: CPT

## 2024-06-05 PROCEDURE — 99232 SBSQ HOSP IP/OBS MODERATE 35: CPT | Performed by: PHYSICIAN ASSISTANT

## 2024-06-05 PROCEDURE — 1200000000 HC SEMI PRIVATE

## 2024-06-05 PROCEDURE — 51798 US URINE CAPACITY MEASURE: CPT

## 2024-06-05 RX ADMIN — ENOXAPARIN SODIUM 40 MG: 100 INJECTION SUBCUTANEOUS at 09:12

## 2024-06-05 RX ADMIN — AMLODIPINE BESYLATE 5 MG: 5 TABLET ORAL at 09:13

## 2024-06-05 RX ADMIN — METOPROLOL TARTRATE 25 MG: 50 TABLET, FILM COATED ORAL at 09:13

## 2024-06-05 RX ADMIN — SENNOSIDES 8.6 MG: 8.6 TABLET, FILM COATED ORAL at 20:42

## 2024-06-05 RX ADMIN — ASPIRIN 81 MG: 81 TABLET, COATED ORAL at 09:13

## 2024-06-05 RX ADMIN — HYDROCODONE BITARTRATE AND ACETAMINOPHEN 1 TABLET: 5; 325 TABLET ORAL at 16:25

## 2024-06-05 RX ADMIN — ATORVASTATIN CALCIUM 10 MG: 10 TABLET, FILM COATED ORAL at 09:12

## 2024-06-05 RX ADMIN — METOPROLOL TARTRATE 25 MG: 50 TABLET, FILM COATED ORAL at 20:43

## 2024-06-05 RX ADMIN — HYDROCODONE BITARTRATE AND ACETAMINOPHEN 2 TABLET: 5; 325 TABLET ORAL at 00:58

## 2024-06-05 RX ADMIN — SODIUM CHLORIDE, PRESERVATIVE FREE 10 ML: 5 INJECTION INTRAVENOUS at 09:13

## 2024-06-05 RX ADMIN — HYDROCODONE BITARTRATE AND ACETAMINOPHEN 2 TABLET: 5; 325 TABLET ORAL at 20:45

## 2024-06-05 RX ADMIN — HYDROCODONE BITARTRATE AND ACETAMINOPHEN 2 TABLET: 5; 325 TABLET ORAL at 05:04

## 2024-06-05 RX ADMIN — SODIUM CHLORIDE, PRESERVATIVE FREE 10 ML: 5 INJECTION INTRAVENOUS at 20:41

## 2024-06-05 RX ADMIN — HYDROCODONE BITARTRATE AND ACETAMINOPHEN 1 TABLET: 5; 325 TABLET ORAL at 09:12

## 2024-06-05 ASSESSMENT — PAIN SCALES - GENERAL
PAINLEVEL_OUTOF10: 8
PAINLEVEL_OUTOF10: 10
PAINLEVEL_OUTOF10: 5
PAINLEVEL_OUTOF10: 2
PAINLEVEL_OUTOF10: 7
PAINLEVEL_OUTOF10: 5
PAINLEVEL_OUTOF10: 8

## 2024-06-05 ASSESSMENT — PAIN DESCRIPTION - LOCATION
LOCATION: BACK
LOCATION: SACRUM
LOCATION: BACK
LOCATION: BACK

## 2024-06-05 ASSESSMENT — PATIENT HEALTH QUESTIONNAIRE - PHQ9
SUM OF ALL RESPONSES TO PHQ QUESTIONS 1-9: 0
1. LITTLE INTEREST OR PLEASURE IN DOING THINGS: NOT AT ALL
SUM OF ALL RESPONSES TO PHQ QUESTIONS 1-9: 0
2. FEELING DOWN, DEPRESSED OR HOPELESS: NOT AT ALL
SUM OF ALL RESPONSES TO PHQ QUESTIONS 1-9: 0
SUM OF ALL RESPONSES TO PHQ QUESTIONS 1-9: 0
SUM OF ALL RESPONSES TO PHQ9 QUESTIONS 1 & 2: 0

## 2024-06-05 ASSESSMENT — PAIN - FUNCTIONAL ASSESSMENT
PAIN_FUNCTIONAL_ASSESSMENT: PREVENTS OR INTERFERES SOME ACTIVE ACTIVITIES AND ADLS
PAIN_FUNCTIONAL_ASSESSMENT: PREVENTS OR INTERFERES SOME ACTIVE ACTIVITIES AND ADLS

## 2024-06-05 ASSESSMENT — LIFESTYLE VARIABLES
HOW MANY STANDARD DRINKS CONTAINING ALCOHOL DO YOU HAVE ON A TYPICAL DAY: PATIENT DOES NOT DRINK
HOW OFTEN DO YOU HAVE A DRINK CONTAINING ALCOHOL: NEVER

## 2024-06-05 ASSESSMENT — PAIN DESCRIPTION - ORIENTATION
ORIENTATION: POSTERIOR;LOWER
ORIENTATION: POSTERIOR;LOWER

## 2024-06-05 ASSESSMENT — PAIN DESCRIPTION - DESCRIPTORS
DESCRIPTORS: ACHING
DESCRIPTORS: ACHING;SHARP

## 2024-06-05 NOTE — PROCEDURES
PROCEDURE NOTE  Date: 6/5/2024   Name: Sybil aPula  YOB: 1931    Procedures  Bladder scan completed at 0215 and results told to Magda BLOOD. Scan showed 394 mL in the patients bladder. Last void was unknown.

## 2024-06-05 NOTE — PROCEDURES
PROCEDURE NOTE  Date: 6/5/2024   Name: Sybil Paula  YOB: 1931    Procedures      Bladder scan completed and results told to RN. Scan showed 338 mL in the patients bladder.Rach

## 2024-06-05 NOTE — CARE COORDINATION
6/5/24, 3:24 PM EDT    DISCHARGE ON GOING EVALUATION    Sybil OTTO [facility] day: 3  Location: -02/002-A Reason for admit: Intractable pain [R52]  Closed fracture of sacrum, unspecified portion of sacrum, initial encounter ([facility]) [S32.10XA]     Procedures:   6/5 bladder scan: >335 urine retained    Imaging since last note: na    Barriers to Discharge: MOCA 17/30, hospitalist and ortho spine follows, non sugical tx. PT/OT, back brace, pain control.     PCP: Kurt Olsen MD  Readmission Risk Score: 12.8    Patient Goals/Plan/Treatment Preferences: spoke with Sybil; pt agreeable to speak with KATARINA on Thursday re; possible SNF for rehab.    SW consulted.

## 2024-06-05 NOTE — FLOWSHEET NOTE
06/05/24 0300   Urine Assessment   Urinary Status Retention   Intermittent/Straight Cath (mL) 400 mL   $ Cath urethra straight $ Yes

## 2024-06-06 PROCEDURE — 97530 THERAPEUTIC ACTIVITIES: CPT

## 2024-06-06 PROCEDURE — 99232 SBSQ HOSP IP/OBS MODERATE 35: CPT | Performed by: PHYSICIAN ASSISTANT

## 2024-06-06 PROCEDURE — 97116 GAIT TRAINING THERAPY: CPT

## 2024-06-06 PROCEDURE — 1200000000 HC SEMI PRIVATE

## 2024-06-06 PROCEDURE — 6360000002 HC RX W HCPCS: Performed by: NURSE PRACTITIONER

## 2024-06-06 PROCEDURE — 6370000000 HC RX 637 (ALT 250 FOR IP): Performed by: NURSE PRACTITIONER

## 2024-06-06 PROCEDURE — 97130 THER IVNTJ EA ADDL 15 MIN: CPT

## 2024-06-06 PROCEDURE — 97535 SELF CARE MNGMENT TRAINING: CPT

## 2024-06-06 PROCEDURE — 97110 THERAPEUTIC EXERCISES: CPT

## 2024-06-06 PROCEDURE — 97129 THER IVNTJ 1ST 15 MIN: CPT

## 2024-06-06 PROCEDURE — 97166 OT EVAL MOD COMPLEX 45 MIN: CPT

## 2024-06-06 PROCEDURE — 2580000003 HC RX 258: Performed by: NURSE PRACTITIONER

## 2024-06-06 RX ADMIN — SODIUM CHLORIDE, PRESERVATIVE FREE 10 ML: 5 INJECTION INTRAVENOUS at 21:51

## 2024-06-06 RX ADMIN — ASPIRIN 81 MG: 81 TABLET, COATED ORAL at 09:20

## 2024-06-06 RX ADMIN — ATORVASTATIN CALCIUM 10 MG: 10 TABLET, FILM COATED ORAL at 09:21

## 2024-06-06 RX ADMIN — HYDROCODONE BITARTRATE AND ACETAMINOPHEN 2 TABLET: 5; 325 TABLET ORAL at 16:49

## 2024-06-06 RX ADMIN — HYDROCODONE BITARTRATE AND ACETAMINOPHEN 2 TABLET: 5; 325 TABLET ORAL at 12:43

## 2024-06-06 RX ADMIN — METOPROLOL TARTRATE 25 MG: 50 TABLET, FILM COATED ORAL at 09:21

## 2024-06-06 RX ADMIN — HYDROCODONE BITARTRATE AND ACETAMINOPHEN 2 TABLET: 5; 325 TABLET ORAL at 01:02

## 2024-06-06 RX ADMIN — SENNOSIDES 8.6 MG: 8.6 TABLET, FILM COATED ORAL at 21:54

## 2024-06-06 RX ADMIN — AMLODIPINE BESYLATE 5 MG: 5 TABLET ORAL at 09:20

## 2024-06-06 RX ADMIN — Medication 3 MG: at 01:02

## 2024-06-06 RX ADMIN — ENOXAPARIN SODIUM 40 MG: 100 INJECTION SUBCUTANEOUS at 09:21

## 2024-06-06 RX ADMIN — SODIUM CHLORIDE, PRESERVATIVE FREE 10 ML: 5 INJECTION INTRAVENOUS at 09:00

## 2024-06-06 RX ADMIN — HYDROCODONE BITARTRATE AND ACETAMINOPHEN 1 TABLET: 5; 325 TABLET ORAL at 21:53

## 2024-06-06 RX ADMIN — METOPROLOL TARTRATE 25 MG: 50 TABLET, FILM COATED ORAL at 21:55

## 2024-06-06 RX ADMIN — HYDROCODONE BITARTRATE AND ACETAMINOPHEN 2 TABLET: 5; 325 TABLET ORAL at 08:19

## 2024-06-06 RX ADMIN — Medication 3 MG: at 21:53

## 2024-06-06 ASSESSMENT — PAIN SCALES - GENERAL
PAINLEVEL_OUTOF10: 7
PAINLEVEL_OUTOF10: 7
PAINLEVEL_OUTOF10: 0
PAINLEVEL_OUTOF10: 7
PAINLEVEL_OUTOF10: 9
PAINLEVEL_OUTOF10: 9
PAINLEVEL_OUTOF10: 5
PAINLEVEL_OUTOF10: 1
PAINLEVEL_OUTOF10: 9

## 2024-06-06 ASSESSMENT — PAIN DESCRIPTION - DESCRIPTORS
DESCRIPTORS: SORE
DESCRIPTORS: SHARP
DESCRIPTORS: SORE
DESCRIPTORS: SORE

## 2024-06-06 ASSESSMENT — PAIN DESCRIPTION - ORIENTATION
ORIENTATION: MID

## 2024-06-06 ASSESSMENT — PAIN - FUNCTIONAL ASSESSMENT
PAIN_FUNCTIONAL_ASSESSMENT: PREVENTS OR INTERFERES WITH ALL ACTIVE AND SOME PASSIVE ACTIVITIES
PAIN_FUNCTIONAL_ASSESSMENT: PREVENTS OR INTERFERES WITH ALL ACTIVE AND SOME PASSIVE ACTIVITIES
PAIN_FUNCTIONAL_ASSESSMENT: PREVENTS OR INTERFERES SOME ACTIVE ACTIVITIES AND ADLS

## 2024-06-06 ASSESSMENT — PAIN DESCRIPTION - LOCATION
LOCATION: SACRUM
LOCATION: BUTTOCKS

## 2024-06-06 NOTE — CARE COORDINATION
DISCHARGE PLANNING EVALUATION  6/6/24, 11:49 AM EDT    Reason for Referral: snf  Decision Maker: makes own decisions with support from family, no POA listed  Current Services: none   New Services Requested: snf for rehab   Family/ Social/ Home environment: Sybil lives at home alone, has support from daughter.  She is considering snf for rehab before returning home.  She has someone who does her housekeeping, family helps with transportation   Payment Source:[employer] medicare   Transportation at Discharge: undetermined   Post-acute (PAC) provider list was provided to patient. Patient was informed of their freedom to choose PAC provider. Discussed and offered to show the patient the relevant PAC Providers quality and resource use measures on Medicare Compare web site via computer based on patient's goals of care and treatment preferences. Questions regarding selection process were answered.      Teach Back Method used with patient regarding care plan and discharge plan  Patient  verbalized understanding of the plan of care and contribute to goal setting.       Patient preferences and discharge plan:  spoke with patient about discharge plan.  She is considering snf for rehab before returning home.   SNF list provided and discussed, she is discussing with her daughter for preferences.  Will need precert for accepting snf.     Spoke with son, who requested snf list of in network providers, emailed to patient 's son, Ray.      Electronically signed by GERALDINE Griffith on 6/6/2024 at 11:49 AM

## 2024-06-07 PROCEDURE — 99231 SBSQ HOSP IP/OBS SF/LOW 25: CPT | Performed by: PHYSICIAN ASSISTANT

## 2024-06-07 PROCEDURE — 2580000003 HC RX 258: Performed by: NURSE PRACTITIONER

## 2024-06-07 PROCEDURE — 6370000000 HC RX 637 (ALT 250 FOR IP): Performed by: NURSE PRACTITIONER

## 2024-06-07 PROCEDURE — 1200000000 HC SEMI PRIVATE

## 2024-06-07 PROCEDURE — 97535 SELF CARE MNGMENT TRAINING: CPT

## 2024-06-07 PROCEDURE — 6360000002 HC RX W HCPCS: Performed by: NURSE PRACTITIONER

## 2024-06-07 PROCEDURE — 97110 THERAPEUTIC EXERCISES: CPT

## 2024-06-07 PROCEDURE — 97116 GAIT TRAINING THERAPY: CPT

## 2024-06-07 RX ADMIN — Medication 3 MG: at 21:13

## 2024-06-07 RX ADMIN — METOPROLOL TARTRATE 25 MG: 50 TABLET, FILM COATED ORAL at 21:13

## 2024-06-07 RX ADMIN — ATORVASTATIN CALCIUM 10 MG: 10 TABLET, FILM COATED ORAL at 08:09

## 2024-06-07 RX ADMIN — METOPROLOL TARTRATE 25 MG: 50 TABLET, FILM COATED ORAL at 08:09

## 2024-06-07 RX ADMIN — ENOXAPARIN SODIUM 40 MG: 100 INJECTION SUBCUTANEOUS at 08:09

## 2024-06-07 RX ADMIN — SENNOSIDES 8.6 MG: 8.6 TABLET, FILM COATED ORAL at 21:13

## 2024-06-07 RX ADMIN — ACETAMINOPHEN 650 MG: 325 TABLET ORAL at 15:19

## 2024-06-07 RX ADMIN — HYDROCODONE BITARTRATE AND ACETAMINOPHEN 2 TABLET: 5; 325 TABLET ORAL at 21:12

## 2024-06-07 RX ADMIN — SODIUM CHLORIDE, PRESERVATIVE FREE 10 ML: 5 INJECTION INTRAVENOUS at 08:02

## 2024-06-07 RX ADMIN — HYDROCODONE BITARTRATE AND ACETAMINOPHEN 1 TABLET: 5; 325 TABLET ORAL at 08:08

## 2024-06-07 RX ADMIN — ASPIRIN 81 MG: 81 TABLET, COATED ORAL at 08:08

## 2024-06-07 RX ADMIN — ACETAMINOPHEN 650 MG: 325 TABLET ORAL at 04:27

## 2024-06-07 RX ADMIN — AMLODIPINE BESYLATE 5 MG: 5 TABLET ORAL at 08:09

## 2024-06-07 RX ADMIN — SODIUM CHLORIDE, PRESERVATIVE FREE 10 ML: 5 INJECTION INTRAVENOUS at 21:13

## 2024-06-07 ASSESSMENT — PAIN SCALES - GENERAL
PAINLEVEL_OUTOF10: 9
PAINLEVEL_OUTOF10: 2
PAINLEVEL_OUTOF10: 3
PAINLEVEL_OUTOF10: 2
PAINLEVEL_OUTOF10: 7
PAINLEVEL_OUTOF10: 3
PAINLEVEL_OUTOF10: 0

## 2024-06-07 NOTE — DISCHARGE INSTR - COC
Continuity of Care Form    Patient Name: Sybil Paula   :  1931  MRN:  [id number]    Admit date:  2024  Discharge date:  6/10/2024    Code Status Order: Full Code   Advance Directives:     Admitting Physician:  No admitting provider for patient encounter.  PCP: Kurt Olsen MD    Discharging Nurse: Kartik  [facility] Hospital Unit/Room#: 7K-02/002-A  Discharging Unit Phone Number: [phone]    Emergency Contact:   Extended Emergency Contact Information  Primary Emergency Contact: Tavia Lewis   [facility]  Home Phone: [phone]  Mobile Phone: [phone]  Relation: Child  Preferred language: English   needed? No  Secondary Emergency Contact: JavyKendra  Home Phone: [phone]  Relation: Step Child  Preferred language: English   needed? No    Past Surgical History:  Past Surgical History:   Procedure Laterality Date    ANOMALOUS VENOUS RETURN REPAIR      CARDIAC VALVE REPLACEMENT      CARPAL TUNNEL RELEASE Right     ELBOW SURGERY       right elbow    TONSILLECTOMY         Immunization History:     There is no immunization history on file for this patient.    Active Problems:  Patient Active Problem List   Diagnosis Code    HTN (hypertension) I10    Hyperlipidemia E78.5    Osteoporosis M81.0    Allergic rhinitis J30.9    IBS (irritable bowel syndrome) K58.9    Osteoarthrosis involving multiple sites M15.9    Medication monitoring encounter Z51.81    Postmenopausal bone loss M81.0    Anemia D64.9    Right hand weakness, atrophy of thumb muscle. R29.898    SOB (shortness of breath) on exertion R06.02    Chronic congestive heart failure (HCC) I50.9    Systolic ejection murmur 5/6 best aortic area R01.1    Pansystolic murmur- best tricuspid area 5/6 R01.1    Severe aortic stenosis I35.0    Cardiomyopathy (HCC) I42.9    S/P CATH - LHC AND RHC- LM-OSTAIL 10% STENOSIS, LAD-PATENT, LCX-P, RCA-P, ; RHC PAP 55/26 MEAN 38 MMHG,PCWP 26 MMHG,- TO BE SCHEDULED

## 2024-06-07 NOTE — CARE COORDINATION
6/7/24, 9:48 AM EDT    DISCHARGE ON GOING EVALUATION    Sybil [facility] day: 5  Location: -02/002-A Reason for admit: Intractable pain [R52]  Closed fracture of sacrum, unspecified portion of sacrum, initial encounter ([facility]) [S32.10XA]     Procedures:   6/5 bladder scan: >335 urine retained   Imaging since last note: na    Barriers to Discharge: needs placement at SNF, precert. Continue therapy.    PCP: Kurt Olsen MD  Readmission Risk Score: 10.3    Patient Goals/Plan/Treatment Preferences: Sybil plans SNF; [facility] and will be precert. SW follows.

## 2024-06-07 NOTE — CARE COORDINATION
6/7/24, 9:35 AM EDT    DISCHARGE PLANNING EVALUATION    Spoke with daughter, message left for son also, they request [facility].  Referral made, [facility] [place] will accept and will start precert today.     Updated daughter, Tavia.

## 2024-06-08 PROCEDURE — 99231 SBSQ HOSP IP/OBS SF/LOW 25: CPT | Performed by: PHYSICIAN ASSISTANT

## 2024-06-08 PROCEDURE — 1200000000 HC SEMI PRIVATE

## 2024-06-08 PROCEDURE — 2580000003 HC RX 258: Performed by: NURSE PRACTITIONER

## 2024-06-08 PROCEDURE — 6360000002 HC RX W HCPCS: Performed by: NURSE PRACTITIONER

## 2024-06-08 PROCEDURE — 6370000000 HC RX 637 (ALT 250 FOR IP): Performed by: NURSE PRACTITIONER

## 2024-06-08 RX ADMIN — HYDROCODONE BITARTRATE AND ACETAMINOPHEN 2 TABLET: 5; 325 TABLET ORAL at 04:41

## 2024-06-08 RX ADMIN — ENOXAPARIN SODIUM 40 MG: 100 INJECTION SUBCUTANEOUS at 08:26

## 2024-06-08 RX ADMIN — AMLODIPINE BESYLATE 5 MG: 5 TABLET ORAL at 08:25

## 2024-06-08 RX ADMIN — HYDROCODONE BITARTRATE AND ACETAMINOPHEN 2 TABLET: 5; 325 TABLET ORAL at 13:27

## 2024-06-08 RX ADMIN — SODIUM CHLORIDE, PRESERVATIVE FREE 10 ML: 5 INJECTION INTRAVENOUS at 19:46

## 2024-06-08 RX ADMIN — METOPROLOL TARTRATE 25 MG: 50 TABLET, FILM COATED ORAL at 08:26

## 2024-06-08 RX ADMIN — METOPROLOL TARTRATE 25 MG: 50 TABLET, FILM COATED ORAL at 19:44

## 2024-06-08 RX ADMIN — ASPIRIN 81 MG: 81 TABLET, COATED ORAL at 08:26

## 2024-06-08 RX ADMIN — SENNOSIDES 8.6 MG: 8.6 TABLET, FILM COATED ORAL at 19:44

## 2024-06-08 RX ADMIN — SODIUM CHLORIDE, PRESERVATIVE FREE 10 ML: 5 INJECTION INTRAVENOUS at 08:26

## 2024-06-08 RX ADMIN — HYDROCODONE BITARTRATE AND ACETAMINOPHEN 2 TABLET: 5; 325 TABLET ORAL at 19:44

## 2024-06-08 RX ADMIN — ATORVASTATIN CALCIUM 10 MG: 10 TABLET, FILM COATED ORAL at 08:26

## 2024-06-08 ASSESSMENT — PAIN SCALES - GENERAL
PAINLEVEL_OUTOF10: 7
PAINLEVEL_OUTOF10: 4
PAINLEVEL_OUTOF10: 7
PAINLEVEL_OUTOF10: 7
PAINLEVEL_OUTOF10: 3
PAINLEVEL_OUTOF10: 0

## 2024-06-08 ASSESSMENT — PAIN DESCRIPTION - LOCATION: LOCATION: BACK

## 2024-06-08 ASSESSMENT — PAIN DESCRIPTION - DESCRIPTORS: DESCRIPTORS: ACHING

## 2024-06-08 ASSESSMENT — PAIN DESCRIPTION - ORIENTATION: ORIENTATION: LOWER

## 2024-06-09 PROCEDURE — 99231 SBSQ HOSP IP/OBS SF/LOW 25: CPT | Performed by: PHYSICIAN ASSISTANT

## 2024-06-09 PROCEDURE — 6360000002 HC RX W HCPCS: Performed by: NURSE PRACTITIONER

## 2024-06-09 PROCEDURE — 97110 THERAPEUTIC EXERCISES: CPT

## 2024-06-09 PROCEDURE — 6370000000 HC RX 637 (ALT 250 FOR IP): Performed by: NURSE PRACTITIONER

## 2024-06-09 PROCEDURE — 1200000000 HC SEMI PRIVATE

## 2024-06-09 PROCEDURE — 97535 SELF CARE MNGMENT TRAINING: CPT

## 2024-06-09 PROCEDURE — 2580000003 HC RX 258: Performed by: NURSE PRACTITIONER

## 2024-06-09 RX ADMIN — ENOXAPARIN SODIUM 40 MG: 100 INJECTION SUBCUTANEOUS at 08:05

## 2024-06-09 RX ADMIN — Medication 3 MG: at 21:30

## 2024-06-09 RX ADMIN — ASPIRIN 81 MG: 81 TABLET, COATED ORAL at 08:05

## 2024-06-09 RX ADMIN — METOPROLOL TARTRATE 25 MG: 50 TABLET, FILM COATED ORAL at 08:05

## 2024-06-09 RX ADMIN — ATORVASTATIN CALCIUM 10 MG: 10 TABLET, FILM COATED ORAL at 08:05

## 2024-06-09 RX ADMIN — HYDROCODONE BITARTRATE AND ACETAMINOPHEN 2 TABLET: 5; 325 TABLET ORAL at 00:23

## 2024-06-09 RX ADMIN — HYDROCODONE BITARTRATE AND ACETAMINOPHEN 2 TABLET: 5; 325 TABLET ORAL at 12:41

## 2024-06-09 RX ADMIN — AMLODIPINE BESYLATE 5 MG: 5 TABLET ORAL at 08:05

## 2024-06-09 RX ADMIN — SODIUM CHLORIDE, PRESERVATIVE FREE 10 ML: 5 INJECTION INTRAVENOUS at 21:30

## 2024-06-09 RX ADMIN — ACETAMINOPHEN 650 MG: 325 TABLET ORAL at 03:44

## 2024-06-09 RX ADMIN — SENNOSIDES 8.6 MG: 8.6 TABLET, FILM COATED ORAL at 21:30

## 2024-06-09 RX ADMIN — METOPROLOL TARTRATE 25 MG: 50 TABLET, FILM COATED ORAL at 21:30

## 2024-06-09 RX ADMIN — HYDROCODONE BITARTRATE AND ACETAMINOPHEN 1 TABLET: 5; 325 TABLET ORAL at 21:30

## 2024-06-09 RX ADMIN — SODIUM CHLORIDE, PRESERVATIVE FREE 10 ML: 5 INJECTION INTRAVENOUS at 08:05

## 2024-06-09 RX ADMIN — HYDROCODONE BITARTRATE AND ACETAMINOPHEN 2 TABLET: 5; 325 TABLET ORAL at 08:05

## 2024-06-09 ASSESSMENT — PAIN SCALES - GENERAL
PAINLEVEL_OUTOF10: 10
PAINLEVEL_OUTOF10: 2
PAINLEVEL_OUTOF10: 5
PAINLEVEL_OUTOF10: 4
PAINLEVEL_OUTOF10: 3
PAINLEVEL_OUTOF10: 5
PAINLEVEL_OUTOF10: 6
PAINLEVEL_OUTOF10: 7

## 2024-06-09 ASSESSMENT — PAIN DESCRIPTION - LOCATION
LOCATION: BACK

## 2024-06-09 ASSESSMENT — PAIN DESCRIPTION - ORIENTATION
ORIENTATION: LOWER
ORIENTATION: LOWER
ORIENTATION: LOWER;RIGHT

## 2024-06-09 ASSESSMENT — PAIN DESCRIPTION - DESCRIPTORS
DESCRIPTORS: ACHING
DESCRIPTORS: ACHING
DESCRIPTORS: STABBING

## 2024-06-09 ASSESSMENT — PAIN DESCRIPTION - PAIN TYPE: TYPE: ACUTE PAIN

## 2024-06-09 ASSESSMENT — PAIN DESCRIPTION - FREQUENCY: FREQUENCY: INTERMITTENT

## 2024-06-09 ASSESSMENT — PAIN - FUNCTIONAL ASSESSMENT: PAIN_FUNCTIONAL_ASSESSMENT: ACTIVITIES ARE NOT PREVENTED

## 2024-06-09 ASSESSMENT — PAIN DESCRIPTION - ONSET: ONSET: ON-GOING

## 2024-06-10 VITALS
DIASTOLIC BLOOD PRESSURE: 59 MMHG | RESPIRATION RATE: 18 BRPM | HEART RATE: 77 BPM | WEIGHT: 119.71 LBS | BODY MASS INDEX: 22.6 KG/M2 | SYSTOLIC BLOOD PRESSURE: 138 MMHG | HEIGHT: 61 IN | TEMPERATURE: 98.2 F | OXYGEN SATURATION: 99 %

## 2024-06-10 PROCEDURE — 6370000000 HC RX 637 (ALT 250 FOR IP): Performed by: NURSE PRACTITIONER

## 2024-06-10 PROCEDURE — 97110 THERAPEUTIC EXERCISES: CPT

## 2024-06-10 PROCEDURE — 97530 THERAPEUTIC ACTIVITIES: CPT

## 2024-06-10 PROCEDURE — 6360000002 HC RX W HCPCS: Performed by: NURSE PRACTITIONER

## 2024-06-10 PROCEDURE — 2580000003 HC RX 258: Performed by: NURSE PRACTITIONER

## 2024-06-10 RX ORDER — PSEUDOEPHEDRINE HCL 30 MG
100 TABLET ORAL 2 TIMES DAILY PRN
DISCHARGE
Start: 2024-06-10

## 2024-06-10 RX ORDER — HYDROCODONE BITARTRATE AND ACETAMINOPHEN 5; 325 MG/1; MG/1
1 TABLET ORAL EVERY 4 HOURS PRN
Qty: 42 TABLET | Refills: 0 | Status: SHIPPED | OUTPATIENT
Start: 2024-06-10 | End: 2024-06-17

## 2024-06-10 RX ORDER — SENNOSIDES A AND B 8.6 MG/1
1 TABLET, FILM COATED ORAL NIGHTLY
Qty: 30 TABLET | Refills: 0 | DISCHARGE
Start: 2024-06-10 | End: 2024-07-10

## 2024-06-10 RX ADMIN — AMLODIPINE BESYLATE 5 MG: 5 TABLET ORAL at 10:20

## 2024-06-10 RX ADMIN — ATORVASTATIN CALCIUM 10 MG: 10 TABLET, FILM COATED ORAL at 10:21

## 2024-06-10 RX ADMIN — DOCUSATE SODIUM 100 MG: 100 CAPSULE, LIQUID FILLED ORAL at 10:20

## 2024-06-10 RX ADMIN — HYDROCODONE BITARTRATE AND ACETAMINOPHEN 2 TABLET: 5; 325 TABLET ORAL at 12:53

## 2024-06-10 RX ADMIN — METOPROLOL TARTRATE 25 MG: 50 TABLET, FILM COATED ORAL at 10:20

## 2024-06-10 RX ADMIN — ENOXAPARIN SODIUM 40 MG: 100 INJECTION SUBCUTANEOUS at 10:20

## 2024-06-10 RX ADMIN — HYDROCODONE BITARTRATE AND ACETAMINOPHEN 2 TABLET: 5; 325 TABLET ORAL at 05:17

## 2024-06-10 RX ADMIN — SODIUM CHLORIDE, PRESERVATIVE FREE 10 ML: 5 INJECTION INTRAVENOUS at 10:24

## 2024-06-10 RX ADMIN — ASPIRIN 81 MG: 81 TABLET, COATED ORAL at 10:21

## 2024-06-10 ASSESSMENT — PAIN - FUNCTIONAL ASSESSMENT: PAIN_FUNCTIONAL_ASSESSMENT: ACTIVITIES ARE NOT PREVENTED

## 2024-06-10 ASSESSMENT — PAIN DESCRIPTION - LOCATION: LOCATION: BACK

## 2024-06-10 ASSESSMENT — PAIN SCALES - GENERAL
PAINLEVEL_OUTOF10: 7
PAINLEVEL_OUTOF10: 9
PAINLEVEL_OUTOF10: 9

## 2024-06-10 ASSESSMENT — PAIN DESCRIPTION - DESCRIPTORS: DESCRIPTORS: SORE

## 2024-06-10 ASSESSMENT — PAIN DESCRIPTION - ORIENTATION: ORIENTATION: LOWER

## 2024-06-10 NOTE — CARE COORDINATION
6/10/24, 1:52 PM EDT    DISCHARGE PLANNING EVALUATION    Planning [facility] today, transport at 2:00 pm,  confirmed plan with patient's son,   NICOLE faxed.     6/10/24, 1:53 PM EDT    Patient goals/plan/ treatment preferences discussed by  and .  Patient goals/plan/ treatment preferences reviewed with patient/ family.  Patient/ family verbalize understanding of discharge plan and are in agreement with goal/plan/treatment preferences.  Understanding was demonstrated using the teach back method.  AVS provided by RN at time of discharge, which includes all necessary medical information pertaining to the patients current course of illness, treatment, post-discharge goals of care, and treatment preferences.     Services At/After Discharge: Skilled Nursing Facility (SNF) and In ambulance

## 2024-06-10 NOTE — PLAN OF CARE
Problem: ABCDS Injury Assessment  Goal: Absence of physical injury  6/10/2024 0956 by Kartik Flaherty RN  Outcome: Progressing  Flowsheets (Taken 6/10/2024 0956)  Absence of Physical Injury: Implement safety measures based on patient assessment  6/10/2024 0416 by Gary Howard RN  Outcome: Progressing  Flowsheets (Taken 6/6/2024 1714 by Kartik Flaherty RN)  Absence of Physical Injury: Implement safety measures based on patient assessment     Problem: Discharge Planning  Goal: Discharge to home or other facility with appropriate resources  6/10/2024 0956 by Kartik Flaherty RN  Outcome: Progressing  Flowsheets (Taken 6/10/2024 0956)  Discharge to home or other facility with appropriate resources:   Identify barriers to discharge with patient and caregiver   Identify discharge learning needs (meds, wound care, etc)   Refer to discharge planning if patient needs post-hospital services based on physician order or complex needs related to functional status, cognitive ability or social support system   Arrange for needed discharge resources and transportation as appropriate  6/10/2024 0416 by Gary Howard RN  Outcome: Progressing  Flowsheets (Taken 6/10/2024 0416)  Discharge to home or other facility with appropriate resources: Identify barriers to discharge with patient and caregiver     Problem: Pain  Goal: Verbalizes/displays adequate comfort level or baseline comfort level  6/10/2024 0956 by Kartik Flaherty RN  Outcome: Progressing  Flowsheets (Taken 6/10/2024 0956)  Verbalizes/displays adequate comfort level or baseline comfort level:   Administer analgesics based on type and severity of pain and evaluate response   Encourage patient to monitor pain and request assistance   Consider cultural and social influences on pain and pain management   Assess pain using appropriate pain scale   Implement non-pharmacological measures as appropriate and evaluate response   Notify Licensed Independent Practitioner if 
  Problem: ABCDS Injury Assessment  Goal: Absence of physical injury  6/4/2024 0930 by Luba Shultz RN  Outcome: Progressing  6/3/2024 2123 by Magda Al RN  Outcome: Progressing  Flowsheets  Taken 6/3/2024 2123  Absence of Physical Injury: Implement safety measures based on patient assessment  Taken 6/3/2024 2120  Absence of Physical Injury: Implement safety measures based on patient assessment     Problem: Discharge Planning  Goal: Discharge to home or other facility with appropriate resources  6/4/2024 0930 by Luba Shultz RN  Outcome: Progressing  6/3/2024 2123 by Magda Al RN  Outcome: Progressing  Flowsheets (Taken 6/3/2024 2123)  Discharge to home or other facility with appropriate resources:   Identify barriers to discharge with patient and caregiver   Arrange for needed discharge resources and transportation as appropriate   Identify discharge learning needs (meds, wound care, etc)     Problem: Pain  Goal: Verbalizes/displays adequate comfort level or baseline comfort level  6/4/2024 0930 by Luba Shultz RN  Outcome: Progressing  6/3/2024 2123 by Magda Al RN  Outcome: Progressing  Flowsheets (Taken 6/3/2024 2123)  Verbalizes/displays adequate comfort level or baseline comfort level:   Encourage patient to monitor pain and request assistance   Assess pain using appropriate pain scale   Administer analgesics based on type and severity of pain and evaluate response   Implement non-pharmacological measures as appropriate and evaluate response     Problem: Safety - Adult  Goal: Free from fall injury  6/4/2024 0930 by Luba Shultz RN  Outcome: Progressing  6/3/2024 2123 by Magda Al RN  Outcome: Progressing  Flowsheets (Taken 6/3/2024 2123)  Free From Fall Injury: Instruct family/caregiver on patient safety     Problem: Skin/Tissue Integrity  Goal: Absence of new skin breakdown  Description: 1.  Monitor for areas of redness and/or skin breakdown  2.  Assess vascular access sites 
  Problem: ABCDS Injury Assessment  Goal: Absence of physical injury  6/4/2024 2121 by Magda Al RN  Outcome: Progressing  Flowsheets  Taken 6/4/2024 2121 by Magda Al RN  Absence of Physical Injury: Implement safety measures based on patient assessment  Taken 6/4/2024 0932 by Luba Shultz RN  Absence of Physical Injury: Implement safety measures based on patient assessment     Problem: Discharge Planning  Goal: Discharge to home or other facility with appropriate resources  6/4/2024 2121 by Magda Al, RN  Outcome: Progressing  Flowsheets (Taken 6/4/2024 2121)  Discharge to home or other facility with appropriate resources:   Identify barriers to discharge with patient and caregiver   Arrange for needed discharge resources and transportation as appropriate   Identify discharge learning needs (meds, wound care, etc)     Problem: Pain  Goal: Verbalizes/displays adequate comfort level or baseline comfort level  6/4/2024 2121 by Magda Al RN  Outcome: Progressing  Flowsheets (Taken 6/4/2024 2121)  Verbalizes/displays adequate comfort level or baseline comfort level:   Encourage patient to monitor pain and request assistance   Assess pain using appropriate pain scale   Implement non-pharmacological measures as appropriate and evaluate response   Administer analgesics based on type and severity of pain and evaluate response     Problem: Safety - Adult  Goal: Free from fall injury  6/4/2024 2121 by Magda Al RN  Outcome: Progressing  Flowsheets (Taken 6/4/2024 2121)  Free From Fall Injury: Instruct family/caregiver on patient safety     Problem: Skin/Tissue Integrity  Goal: Absence of new skin breakdown  Description: 1.  Monitor for areas of redness and/or skin breakdown  2.  Assess vascular access sites hourly  3.  Every 4-6 hours minimum:  Change oxygen saturation probe site  4.  Every 4-6 hours:  If on nasal continuous positive airway pressure, respiratory therapy assess nares and 
  Problem: ABCDS Injury Assessment  Goal: Absence of physical injury  Outcome: Progressing     Problem: Discharge Planning  Goal: Discharge to home or other facility with appropriate resources  Outcome: Progressing  Flowsheets (Taken 6/5/2024 0900)  Discharge to home or other facility with appropriate resources: Arrange for needed discharge resources and transportation as appropriate     Problem: Pain  Goal: Verbalizes/displays adequate comfort level or baseline comfort level  Outcome: Progressing     Problem: Safety - Adult  Goal: Free from fall injury  Outcome: Progressing     Problem: Skin/Tissue Integrity  Goal: Absence of new skin breakdown  Description: 1.  Monitor for areas of redness and/or skin breakdown  2.  Assess vascular access sites hourly  3.  Every 4-6 hours minimum:  Change oxygen saturation probe site  4.  Every 4-6 hours:  If on nasal continuous positive airway pressure, respiratory therapy assess nares and determine need for appliance change or resting period.  Outcome: Progressing     Problem: Respiratory - Adult  Goal: Achieves optimal ventilation and oxygenation  Outcome: Progressing     Problem: Skin/Tissue Integrity - Adult  Goal: Skin integrity remains intact  Outcome: Progressing     Problem: Musculoskeletal - Adult  Goal: Return mobility to safest level of function  Outcome: Progressing  Flowsheets (Taken 6/5/2024 0900)  Return Mobility to Safest Level of Function: Assist with transfers and ambulation using safe patient handling equipment as needed     Problem: Gastrointestinal - Adult  Goal: Maintains or returns to baseline bowel function  Outcome: Progressing     Problem: Infection - Adult  Goal: Absence of infection at discharge  Outcome: Progressing  Flowsheets (Taken 6/5/2024 0900)  Absence of infection at discharge: Assess and monitor for signs and symptoms of infection     Problem: Hematologic - Adult  Goal: Maintains hematologic stability  Outcome: Progressing  Flowsheets (Taken 
  Problem: ABCDS Injury Assessment  Goal: Absence of physical injury  Outcome: Progressing  Flowsheets  Taken 6/3/2024 2123  Absence of Physical Injury: Implement safety measures based on patient assessment  Taken 6/3/2024 2120  Absence of Physical Injury: Implement safety measures based on patient assessment     Problem: Discharge Planning  Goal: Discharge to home or other facility with appropriate resources  Outcome: Progressing  Flowsheets (Taken 6/3/2024 2123)  Discharge to home or other facility with appropriate resources:   Identify barriers to discharge with patient and caregiver   Arrange for needed discharge resources and transportation as appropriate   Identify discharge learning needs (meds, wound care, etc)     Problem: Pain  Goal: Verbalizes/displays adequate comfort level or baseline comfort level  Outcome: Progressing  Flowsheets (Taken 6/3/2024 2123)  Verbalizes/displays adequate comfort level or baseline comfort level:   Encourage patient to monitor pain and request assistance   Assess pain using appropriate pain scale   Administer analgesics based on type and severity of pain and evaluate response   Implement non-pharmacological measures as appropriate and evaluate response     Problem: Safety - Adult  Goal: Free from fall injury  Outcome: Progressing  Flowsheets (Taken 6/3/2024 2123)  Free From Fall Injury: Instruct family/caregiver on patient safety     Problem: Skin/Tissue Integrity  Goal: Absence of new skin breakdown  Description: 1.  Monitor for areas of redness and/or skin breakdown  2.  Assess vascular access sites hourly  3.  Every 4-6 hours minimum:  Change oxygen saturation probe site  4.  Every 4-6 hours:  If on nasal continuous positive airway pressure, respiratory therapy assess nares and determine need for appliance change or resting period.  Outcome: Progressing  Note: Full skin assessment performed.  See flow sheets.      Problem: Respiratory - Adult  Goal: Achieves optimal 
  Problem: ABCDS Injury Assessment  Goal: Absence of physical injury  Outcome: Progressing  Flowsheets (Taken 6/6/2024 1714)  Absence of Physical Injury: Implement safety measures based on patient assessment     Problem: Discharge Planning  Goal: Discharge to home or other facility with appropriate resources  6/6/2024 1714 by Kartik Flaherty RN  Outcome: Progressing  Flowsheets (Taken 6/6/2024 1714)  Discharge to home or other facility with appropriate resources:   Identify barriers to discharge with patient and caregiver   Identify discharge learning needs (meds, wound care, etc)   Refer to discharge planning if patient needs post-hospital services based on physician order or complex needs related to functional status, cognitive ability or social support system   Arrange for needed discharge resources and transportation as appropriate  6/6/2024 1143 by Mary Kay Murry LSW  Outcome: Progressing     Problem: Pain  Goal: Verbalizes/displays adequate comfort level or baseline comfort level  Outcome: Progressing  Flowsheets  Taken 6/6/2024 1714 by Kartik Flaherty RN  Verbalizes/displays adequate comfort level or baseline comfort level:   Administer analgesics based on type and severity of pain and evaluate response   Encourage patient to monitor pain and request assistance   Consider cultural and social influences on pain and pain management   Assess pain using appropriate pain scale   Implement non-pharmacological measures as appropriate and evaluate response   Notify Licensed Independent Practitioner if interventions unsuccessful or patient reports new pain  Taken 6/6/2024 0400 by Manjula Roman RN  Verbalizes/displays adequate comfort level or baseline comfort level: Encourage patient to monitor pain and request assistance     Problem: Safety - Adult  Goal: Free from fall injury  Outcome: Progressing  Flowsheets (Taken 6/6/2024 1714)  Free From Fall Injury: Instruct family/caregiver on patient safety   
  Problem: Discharge Planning  Goal: Discharge to home or other facility with appropriate resources  6/6/2024 0216 by Manjula Roman RN  Outcome: Progressing  Flowsheets (Taken 6/5/2024 2000)  Discharge to home or other facility with appropriate resources: Identify barriers to discharge with patient and caregiver  6/5/2024 1417 by Luba Shultz RN  Outcome: Progressing  Flowsheets (Taken 6/5/2024 0900)  Discharge to home or other facility with appropriate resources: Arrange for needed discharge resources and transportation as appropriate     Problem: Pain  Goal: Verbalizes/displays adequate comfort level or baseline comfort level  6/6/2024 0216 by Manjula Roman RN  Outcome: Progressing  Flowsheets (Taken 6/5/2024 2041)  Verbalizes/displays adequate comfort level or baseline comfort level: Encourage patient to monitor pain and request assistance  6/5/2024 1417 by Luba Shultz RN  Outcome: Progressing     Problem: Safety - Adult  Goal: Free from fall injury  6/6/2024 0216 by Manjula Roman RN  Outcome: Progressing  6/5/2024 1417 by Luba Shultz RN  Outcome: Progressing     
  Problem: Discharge Planning  Goal: Discharge to home or other facility with appropriate resources  6/7/2024 2322 by Manjula Roman RN  Outcome: Progressing  6/7/2024 2320 by Manjula Roman RN  Outcome: Progressing  Flowsheets (Taken 6/7/2024 2000)  Discharge to home or other facility with appropriate resources: Identify barriers to discharge with patient and caregiver     Problem: Pain  Goal: Verbalizes/displays adequate comfort level or baseline comfort level  6/7/2024 2322 by Manjula Roman RN  Outcome: Progressing  6/7/2024 2320 by Manjula Roman RN  Outcome: Progressing  Flowsheets (Taken 6/7/2024 2115)  Verbalizes/displays adequate comfort level or baseline comfort level: Encourage patient to monitor pain and request assistance     Problem: Safety - Adult  Goal: Free from fall injury  6/7/2024 2322 by Manjula Roman RN  Outcome: Progressing  6/7/2024 2320 by Manjula Roman RN  Outcome: Progressing     
  Problem: Discharge Planning  Goal: Discharge to home or other facility with appropriate resources  Outcome: Progressing  Flowsheets (Taken 6/7/2024 2000)  Discharge to home or other facility with appropriate resources: Identify barriers to discharge with patient and caregiver     Problem: Pain  Goal: Verbalizes/displays adequate comfort level or baseline comfort level  Outcome: Progressing  Flowsheets (Taken 6/7/2024 2115)  Verbalizes/displays adequate comfort level or baseline comfort level: Encourage patient to monitor pain and request assistance     Problem: Safety - Adult  Goal: Free from fall injury  Outcome: Progressing     
  Problem: Discharge Planning  Goal: Discharge to home or other facility with appropriate resources  Outcome: Progressing  Flowsheets (Taken 6/8/2024 1930)  Discharge to home or other facility with appropriate resources: Identify barriers to discharge with patient and caregiver     Problem: ABCDS Injury Assessment  Goal: Absence of physical injury  Outcome: Progressing     Problem: Pain  Goal: Verbalizes/displays adequate comfort level or baseline comfort level  Outcome: Progressing  Flowsheets (Taken 6/8/2024 1944)  Verbalizes/displays adequate comfort level or baseline comfort level: Encourage patient to monitor pain and request assistance     Problem: Safety - Adult  Goal: Free from fall injury  Outcome: Progressing  Note: Hourly rounds in place and call light within reach     Problem: Skin/Tissue Integrity - Adult  Goal: Skin integrity remains intact  Outcome: Progressing  Flowsheets  Taken 6/8/2024 2116  Skin Integrity Remains Intact: Monitor for areas of redness and/or skin breakdown  Taken 6/8/2024 1930  Skin Integrity Remains Intact: Monitor for areas of redness and/or skin breakdown     Care plan reviewed with patient.  Patient verbalizes understanding of the plan of care and contribute to goal setting.     
Planning snf  
Manjula Roman, RN  Outcome: Progressing  6/6/2024 1714 by Kartik Flaherty, RN  Outcome: Progressing  Flowsheets (Taken 6/6/2024 1714)  Free From Fall Injury: Instruct family/caregiver on patient safety     
mentation and behavior   Position to facilitate oxygenation and minimize respiratory effort     Problem: Skin/Tissue Integrity - Adult  Goal: Skin integrity remains intact  Outcome: Progressing  Flowsheets (Taken 6/8/2024 2116 by Gaurang Jimenez, RN)  Skin Integrity Remains Intact: Monitor for areas of redness and/or skin breakdown     Problem: Musculoskeletal - Adult  Goal: Return mobility to safest level of function  Outcome: Progressing  Flowsheets (Taken 6/8/2024 0713 by Willard Cabral, RN)  Return Mobility to Safest Level of Function: Assess patient stability and activity tolerance for standing, transferring and ambulating with or without assistive devices     Problem: Gastrointestinal - Adult  Goal: Maintains or returns to baseline bowel function  Outcome: Progressing  Flowsheets (Taken 6/8/2024 1930 by Gaurang Jimenez, RN)  Maintains or returns to baseline bowel function: Assess bowel function     Problem: Infection - Adult  Goal: Absence of infection at discharge  Outcome: Progressing  Flowsheets (Taken 6/10/2024 0416)  Absence of infection at discharge:   Assess and monitor for signs and symptoms of infection   Monitor lab/diagnostic results     Problem: Hematologic - Adult  Goal: Maintains hematologic stability  Outcome: Progressing  Flowsheets (Taken 6/8/2024 1930 by Gaurang Jimenez, RN)  Maintains hematologic stability: Assess for signs and symptoms of bleeding or hemorrhage     Problem: Chronic Conditions and Co-morbidities  Goal: Patient's chronic conditions and co-morbidity symptoms are monitored and maintained or improved  Outcome: Progressing  Flowsheets (Taken 6/8/2024 1930 by Gaurang Jimenez, RN)  Care Plan - Patient's Chronic Conditions and Co-Morbidity Symptoms are Monitored and Maintained or Improved: Monitor and assess patient's chronic conditions and comorbid symptoms for stability, deterioration, or improvement   Care plan reviewed with patient.  Patient

## 2024-06-10 NOTE — PROGRESS NOTES
Hospitalist Progress Note      Patient:  Sybil Paula    Unit/Bed:38/038A  YOB: 1931  MRN: [id number]   Acct: [id number]   PCP: Kurt Olsen MD  Date of Admission: 6/2/2024    Assessment/Plan:    Acute S2 fractures: Spine consulted. Will likely just treat conservatively. MRI showed sacral insufficiency fractures. Pain control. PT & OT after seeing Spine.  Intractable pain: Patient has received better pain control with Norco this has been continued we will monitor response. Pt was given Tramadol as an OP, did not control pain.   Constipation: Last BM was 3 to 4 days ago.  MiraLAX, Senokot, Colace have been ordered.  Anemia, chronic disease: Hgb stable ~ 10.1. CBC in the AM.   Nonobstructive CAD: C 2020 ASA,Lopressor continued.   TAVR 2021 history  HFpEF: History ECHO 2021 LVEF 60-65%, grade 1 diastolic dysfunction, trace mitral regurgitation  Essential hypertension: History Norvasc and Lopressor have been continued parameters to hold  Dyslipidemia: History Lipitor was continued  Anemia, normocytic: History monitor and trend     Case discussed with Spine.     Chief Complaint: Lower back pain     Initial H and P:-    Initial H&P \"Ronnie Paula is a 89 year old nonagenarian female presented to [facility] 6/2/24 with chief complaint of lower back pain. Complaint of fall occurring two weeks ago and injured her lower back/coccyx. Care was received at [facility] on 5/29/24 and Tramadol was given. No improvement in pain control or ability to move. Ronnie had been using walker to assist with ambulation. Over the past 1-2 days noted no improvement in pain with taking Tramadol. Complaint of lower back pain radiating into rectum and down both legs. #10/10. Aggravated with movement and palpation.  Positive for complaint of poor appetite, constipation and urinary retention.  Denies any syncope or near syncope complaints.  CT imaging completed.  
                                                                       Hospitalist Progress Note      Patient:  Sybil Paula    Unit/Bed:7K-02/002-A  YOB: 1931  MRN: [id number]   Acct: [id number]   PCP: Kurt Olsen MD  Date of Admission: 6/2/2024    Assessment/Plan:    Acute S2 fractures: Spine consulted; treat conservatively. MRI showed sacral insufficiency fractures. Pain control. PT & OT today. Back Brace.   Intractable pain: Patient has received better pain control with Norco this has been continued we will monitor response. Pt was given Tramadol as an OP, did not control pain.   Constipation: Last BM was 3 to 4 days ago.  MiraLAX, Senokot, Colace have been ordered.  Anemia, chronic disease: Hgb stable ~ 10.1. CBC in the AM.   Nonobstructive CAD: C 2020 ASA,Lopressor continued.   TAVR 2021 history  HFpEF: History ECHO 2021 LVEF 60-65%, grade 1 diastolic dysfunction, trace mitral regurgitation  Essential hypertension: History Norvasc and Lopressor have been continued parameters to hold  Dyslipidemia: History Lipitor was continued  Anemia, normocytic: History monitor and trend       Chief Complaint: Lower back pain     Initial H and P:-    Initial H&P \"Ronnie Paula is a 89 year old nonagenarian female presented to [facility] 6/2/24 with chief complaint of lower back pain. Complaint of fall occurring two weeks ago and injured her lower back/coccyx. Care was received at [facility] on 5/29/24 and Tramadol was given. No improvement in pain control or ability to move. Ronnie had been using walker to assist with ambulation. Over the past 1-2 days noted no improvement in pain with taking Tramadol. Complaint of lower back pain radiating into rectum and down both legs. #10/10. Aggravated with movement and palpation.  Positive for complaint of poor appetite, constipation and urinary retention.  Denies any syncope or near syncope complaints.  CT imaging completed.  While in the emergency room patient did 
                                                                       Hospitalist Progress Note      Patient:  Sybil Paula    Unit/Bed:7K-02/002-A  YOB: 1931  MRN: [id number]   Acct: [id number]   PCP: Kurt Olsen MD  Date of Admission: 6/2/2024    Assessment/Plan:    Acute S2 fractures: Spine consulted; treat conservatively. MRI showed sacral insufficiency fractures. Pain control. PT & OT today. Back Brace.   Intractable pain: Patient has received better pain control with Norco this has been continued we will monitor response. Pt was given Tramadol as an OP, did not control pain.   - Pt has received 3 doses of Norco today.   Constipation: Resolved. BM today.  MiraLAX, Senokot, Colace have been ordered.  Anemia, chronic disease: Hgb stable ~ 10.1. CBC in the AM.   Nonobstructive CAD: [facility] 2020 ASA,Lopressor continued.   TAVR 2021 history  HFpEF: History ECHO 2021 LVEF 60-65%, grade 1 diastolic dysfunction, trace mitral regurgitation  Essential hypertension: History Norvasc and Lopressor have been continued parameters to hold  Dyslipidemia: History Lipitor was continued  Anemia, normocytic: History monitor and trend       Chief Complaint: Lower back pain     Initial H and P:-    Initial H&P \"Ronnie Paula is a 89 year old nonagenarian female presented to [facility] 6/2/24 with chief complaint of lower back pain. Complaint of fall occurring two weeks ago and injured her lower back/coccyx. Care was received at [facility] on 5/29/24 and Tramadol was given. No improvement in pain control or ability to move. Ronnie had been using walker to assist with ambulation. Over the past 1-2 days noted no improvement in pain with taking Tramadol. Complaint of lower back pain radiating into rectum and down both legs. #10/10. Aggravated with movement and palpation.  Positive for complaint of poor appetite, constipation and urinary retention.  Denies any syncope or near syncope complaints.  CT imaging completed.  While 
    Hospitalist Progress Note    Patient:  Sybil Paula      Unit/Bed:7K-02/002-A    YOB: 1931    MRN: [id number]       Acct: [id number]     PCP: Kurt Olsen MD    Date of Admission: 6/2/2024    Assessment/Plan:    Acute S2 fractures: Spine consulted; treat conservatively. MRI showed sacral insufficiency fractures. Pain control. PT & OT today. Back Brace.   Intractable pain: Patient has received better pain control with Norco this has been continued we will monitor response. Pt was given Tramadol as an OP, did not control pain.  Constipation: Resolved. BM today.  MiraLAX, Senokot, Colace have been ordered.  Anemia, chronic disease: Hgb stable ~ 10.1.   Nonobstructive CAD: [facility] 2020 ASA,Lopressor continued.   TAVR 2021 history  HFpEF: History ECHO 2021 LVEF 60-65%, grade 1 diastolic dysfunction, trace mitral regurgitation  Essential hypertension: History Norvasc and Lopressor have been continued parameters to hold  Dyslipidemia: History Lipitor was continued  Anemia, normocytic: History monitor and trend    Await Placement for discharge      Chief Complaint: Lower Back Pain      Subjective: 89 y.o. female admitted to the hospitalist service for lower back pain. Patient rates her pain a 5/10 today. She denies chest pain. She denies nausea or vomiting. Await placement.      Medications:    Infusion Medications    sodium chloride       Scheduled Medications    amLODIPine  5 mg Oral Daily    aspirin  81 mg Oral Daily    atorvastatin  10 mg Oral Daily    metoprolol tartrate  25 mg Oral BID    sodium chloride flush  5-40 mL IntraVENous 2 times per day    enoxaparin  40 mg SubCUTAneous Daily    senna  1 tablet Oral Nightly     PRN Meds: HYDROcodone 5 mg - acetaminophen **OR** HYDROcodone 5 mg - acetaminophen, sodium chloride flush, sodium chloride, potassium chloride **OR** potassium alternative oral replacement **OR** potassium chloride, magnesium sulfate, ondansetron **OR** ondansetron, 
 [facility]  INPATIENT PHYSICAL THERAPY  DAILY NOTE  [facility] ORTHOPEDICS 7K - 7K-02/002-A    Time In: 1127  Time Out: 1157  Timed Code Treatment Minutes: 30 Minutes  Minutes: 30          Date: 2024  Patient Name: Sybil Paula,  Gender:  female        MRN: [id number]  : 1931  (89 y.o.)     Referring Practitioner: Daija Virgen APRN - CNP  Diagnosis: Intractable pain  Additional Pertinent Hx: Per EMR:The patient is a 89 y.o. female who presents with above chief complaint. Patient presented to the ED yesterday with complaints of back pain. Patient had a fall ~2 weeks ago. She was evaluated at [facility] by Dr. Menendez's team and CT scan ordered outpatient. She presented to the ED due to worsening pain, primarily in the tailbone making it difficult to ambulate. She denies leg pain or N/T. Patient reports not typically ambulating with a walker at baseline, but she has required a walker since the fall. She reports currently having tailbone pain while lying in bed, symptoms are worse when she attempts to get up and ambulate. Improved with rest. Modifying factors include medication. MRI showed S2 and bilateral sacral ala fractures. Per ortho note: Back brace ordered to be worn when ambulating and prn comfort  No surgical plans at this time.     Prior Level of Function:  Lives With: Alone  Type of Home: House  Home Layout: One level  Home Access: Stairs to enter without rails  Entrance Stairs - Number of Steps: 1 through the front door. 2 steps through back door (garage). Pt states she holds onto the fridge.  Home Equipment: Cane, Walker - Rolling   Bathroom Shower/Tub: Walk-in shower, Tub/Shower unit (Pt uses walk in shower)  Bathroom Toilet: Standard  Bathroom Equipment: Grab bars in shower  Bathroom Accessibility: Walker accessible    Receives Help From: Family  ADL Assistance: Independent  Homemaking Assistance: Independent  Homemaking Responsibilities: Yes  Ambulation Assistance: 
Assessment:  Sybil is a 89 year old female. Patient is resting in her bed. Has just finish her coffee. Engaged in conversation with me but appeared to be forgetting her stream of thoughts. Has an idea of what she want to talk about but could not articulate clearly.Patient is interested in learning about the ACP form. Patient said that her daughter Tavia will be coming in to visit this afternoon and would like for her to be part of the conversation. Patient said she has a son and his family living in [place]. Patient asked for prayer to help her cope, as she is no longer able to drive to [employer] so she watches services on the television. Prayer is offered, document is explained and words of encouragement is given to patient. Patient is grateful for the support.     Plan: Spiritual health  staff will follow up with patient when patient and her daughter are ready for assistant from  team to complete the document. S. H service remain available to patient as needed.   
[facility]  OCCUPATIONAL THERAPY MISSED TREATMENT NOTE  [facility] ORTHOPEDICS 7K  7K-02/002-A      Date: 2024  Patient Name: Sybil Paula        CSN: [id number]   : 1931  (89 y.o.)  Gender: female                REASON FOR MISSED TREATMENT:  Waiting on Lumbar corset from [facility]. OT will check back as time allows.                 
[facility] ORTHOPEDICS 7K  Occupational Therapy  Daily Note  Time:   Time In: 830  Time Out: 923  Timed Code Treatment Minutes: 53 Minutes  Minutes: 53          Date: 2024  Patient Name: Sybil Paula,   Gender: female      Room: [facility]002-A  MRN: [id number]  : 1931  (89 y.o.)  Referring Practitioner: MARI Laureano  Diagnosis: intractable pain  Additional Pertinent Hx: per chart review;     The patient is a 89 y.o. female who presents with above chief complaint. Patient presented to the ED yesterday with complaints of back pain. Patient had a fall ~2 weeks ago. She was evaluated at [facility] by Dr. Menendez's team and CT scan ordered outpatient. She presented to the ED due to worsening pain, primarily in the tailbone making it difficult to ambulate. She denies leg pain or N/T. Patient reports not typically ambulating with a walker at baseline, but she has required a walker since the fall. She reports currently having tailbone pain while lying in bed, symptoms are worse when she attempts to get up and ambulate. Improved with rest. Modifying factors include medication. MRI showed S2 and bilateral sacral ala fractures.    Restrictions/Precautions:  Restrictions/Precautions: Fall Risk, General Precautions  Required Braces or Orthoses  Spinal: Lumbar Corset  Position Activity Restriction  Spinal Precautions: No Bending, No Lifting, No Twisting  Other position/activity restrictions: Back brace on when up ambulating and prn for comfort.     Social/Functional History:  Lives With: Alone  Type of Home: House  Home Layout: One level  Home Access: Stairs to enter without rails  Entrance Stairs - Number of Steps: 1 through the front door. 2 steps through back door (garage). Pt states she holds onto the fridge.  Home Equipment: Cane, Walker - Rolling   Bathroom Shower/Tub: Walk-in shower, Tub/Shower unit (Pt uses walk in shower)  Bathroom Toilet: Standard  Bathroom Equipment: Grab 
Department of Orthopedic Surgery  Spine Service  Attending Progress Note        Subjective:  patient reports her pain is well controlled this am, sitting up in her chair. Denies leg pain or N/T. She feels the Norco is helping to control her pain.     Vitals  VITALS:  /73   Pulse 77   Temp 97.8 °F (36.6 °C) (Axillary)   Resp 16   Ht 1.549 m (5' 1\")   Wt 58.2 kg (128 lb 4.8 oz)   SpO2 97%   BMI 24.24 kg/m²   24HR INTAKE/OUTPUT:    Intake/Output Summary (Last 24 hours) at 6/7/2024 1054  Last data filed at 6/7/2024 0819  Gross per 24 hour   Intake 940 ml   Output 0 ml   Net 940 ml       URINARY CATHETER OUTPUT (Delarosa):  [REMOVED] External Urinary Catheter-Output (mL): 0 mL (not in place)  DRAIN/TUBE OUTPUT:       PHYSICAL EXAM:    Orientation:  alert and oriented to person, place and time    Lower Extremity Motor :  quadriceps, extensor hallucis longus, dorsiflexion, plantarflexion 5/5 bilaterally  Lower Extremity Sensory:  Intact L1-S1      LABS:    HgB:    Lab Results   Component Value Date/Time    HGB 10.1 06/03/2024 06:05 AM     ASSESSMENT AND PLAN:    1) Bilateral sacral ala fractures  2) L1-S1 degenerative disc disease with stenosis    1:  Continue back brace - to be worn when ambulating and prn comfort  2:  PT/OT  3:  Discharge pending - follow up with Dr. Olmstead in 2-3 weeks at discharge    BRENDA Zavala      
Department of Orthopedic Surgery  Spine Service  Attending Progress Note        Subjective:  patient still with back pain. Patient denies leg pain or N/T but nurse report she has pain to the touch in her legs.     Vitals  VITALS:  BP (!) 156/52   Pulse 68   Temp 98.3 °F (36.8 °C) (Oral)   Resp 16   Ht 1.549 m (5' 1\")   Wt 59.7 kg (131 lb 9.6 oz)   SpO2 98%   BMI 24.87 kg/m²   24HR INTAKE/OUTPUT:    Intake/Output Summary (Last 24 hours) at 6/5/2024 0702  Last data filed at 6/5/2024 0425  Gross per 24 hour   Intake 550 ml   Output 400 ml   Net 150 ml     URINARY CATHETER OUTPUT (Delarosa):     DRAIN/TUBE OUTPUT:       PHYSICAL EXAM:    Orientation:  alert and oriented to person, place and time    Lower Extremity Motor :  quadriceps, extensor hallucis longus, dorsiflexion, plantarflexion 5/5 bilaterally  Lower Extremity Sensory:  Intact L1-S1      LABS:    HgB:    Lab Results   Component Value Date/Time    HGB 10.1 06/03/2024 06:05 AM     ASSESSMENT AND PLAN:    1) Bilateral sacral ala fractures  2) L1-S1 degenerative disc disease with stenosis    1:  Continue back brace - to be worn when ambulating and prn comfort  2:  PT/OT  3:  Discharge pending - follow up with Dr. Olmstead in 2-3 weeks at discharge    BRENDA Zavala      
[facility]  INPATIENT PHYSICAL THERAPY  EVALUATION  [facility] ORTHOPEDICS 7K - 7K-02/002-A    Time In: 1343  Time Out: 1428  Timed Code Treatment Minutes: 23 Minutes  Minutes: 45          Date: 2024  Patient Name: Sybil Paula,  Gender:  female        MRN: [id number]  : 1931  (89 y.o.)      Referring Practitioner: Daija Virgen APRN - CNP  Diagnosis: Intractable pain  Additional Pertinent Hx: Per EMR:The patient is a 89 y.o. female who presents with above chief complaint. Patient presented to the ED yesterday with complaints of back pain. Patient had a fall ~2 weeks ago. She was evaluated at [facility] by Dr. Menendez's team and CT scan ordered outpatient. She presented to the ED due to worsening pain, primarily in the tailbone making it difficult to ambulate. She denies leg pain or N/T. Patient reports not typically ambulating with a walker at baseline, but she has required a walker since the fall. She reports currently having tailbone pain while lying in bed, symptoms are worse when she attempts to get up and ambulate. Improved with rest. Modifying factors include medication. MRI showed S2 and bilateral sacral ala fractures. Per ortho note: Back brace ordered to be worn when ambulating and prn comfort  No surgical plans at this time.     Restrictions/Precautions:  Restrictions/Precautions: Fall Risk  Required Braces or Orthoses  Spinal: Lumbar Corset  Position Activity Restriction  Spinal Precautions: No Bending, No Lifting, No Twisting  Other position/activity restrictions: Back brace on when up ambulating and prn for comfort.    Subjective:  Chart Reviewed: Yes  Patient assessed for rehabilitation services?: Yes  Family / Caregiver Present: No  Subjective: RN approved session. Patient resting in bed and agreeable to session. Pt taking notes during therapy session. Patient with decreased safety awareness and therapist had a hard time following patient's though process at times. 
[facility]  INPATIENT SPEECH THERAPY  [facility] ORTHOPEDICS 7K  DAILY NOTE    TIME   SLP Individual Minutes  Time In: 1123  Time Out: 1158  Minutes: 35  Timed Code Treatment Minutes: 35 Minutes       Date: 2024  Patient Name: Sybil Paula      CSN: [id number]   : 1931  (89 y.o.)  Gender: female   Referring Physician:   BRENDA Rendon  Diagnosis: Intractable Pain   Precautions: Fall Risk    Current Diet: Regular diet with thin liquids   Respiratory Status: Room Air  Swallowing Strategies: Standard Universal Swallow Precautions  Date of Last MBS/FEES: Not Applicable    Pain:  No pain reported.    Subjective: RN Luba approved ST attempt. Patient pleasant and cooperative. ST also reviewed most recent results/recommendations for cognitive evaluation completed .     Short-Term Goals:  SHORT TERM GOAL #1:  Goal 1: Patient will complete recall, short term memory and working memory tasks with 75% accuracy provided mod cues to improve overall recall of daily and medical information  INTERVENTIONS:  Orientation: 100% with use of cell phone  Location: independent    Compensatory memory strategies instructed; WRAP- write it down, repeat, associate, picture it     Functional recall x4 item grocery list:   Bread, milk, eggs, butter  Immediate recall: 4/4 independent  5 minute delay: 4/4 independent   10 minute delay: 4/4 independent     Functional recall: x4 new units of information related to ST (diane. 2 kids, water ski, [place])   Immediate recall: 4/4 independent  5 minute delay: 2/4 independent, 2/4 min cues    10 minute delay: 2/4 independent, 2/4 min cues    SHORT TERM GOAL #2:  Goal 2: Patient will complete executive functioning tasks with 75% accuracy provided mod cues to improve overall return to independent living (Ex. driving, cooking, finances, medications).  INTERVENTIONS:   Check writing:   Trial 1:  independent  Trial 2:  independent, /6 incorrect date       Long-Term Goals: No 
Patient transported via stretcher to ambulance with all personal belongings  
Report called to nurse ta at [facility]  
SBIRT screening completed per trauma protocol. SBIRT Findings are Negative.  Patient denies alcohol and/or drug use. Also denies depressive symptoms.    Brief Intervention and Referral to Treatment Summary N/A    Patient was provided PHQ-9, AUDIT-C and DAST Screening:      PHQ-9 Score:  Negative  AUDIT-C Score:  Negative  DAST Score:   Negative    Patient’s substance use is considered-Negative    Low Risk/Healthy  Moderate Risk  Harmful  Dependent    Patient’s depression is considered:-Negative    Minimal  Mild   Moderate  Moderately Severe  Severe    Brief Education Was Provided N/A    Patient was receptive  Patient was not receptive      Brief Intervention Is Provided (Only for AUDIT-C or DAST) N/A    Patient reports readiness to decrease and/or stop use and a plan was discussed   Patient denies readiness to decrease and/or stop use and a plan was not discussed    Injured Trauma Survivor Screening  1.  When you were injured or right afterward   Did you think you were going to die? RESPONSES; YES+1/NO: NO  Do you think this was done to you intentionally? NO    Since your injury  Have you felt more restless, tense or jumpy than usual? RESPONSES; YES+1/NO: NO  Have you found yourself unable to stop worrying? RESPONSES; YES+1/NO: NO  Do you find yourself thinking that the world is unsafe and that people are not to be trusted? RESPONSES; YES+1/NO: NO    TOTAL SCORE from ITSS Questions 1 and 2:   0  NOTE: A score of greater than or equal to 2 is considered positive for PTSD risk and is to receive a community resource packet to link with appropriate providers.    Recommendations/Referrals for Brief and/or Specialized Treatment Provided to Patient  N/A    
[facility]  INPATIENT SPEECH THERAPY  [facility] ORTHOPEDICS 7K  DAILY NOTE    TIME   SLP Individual Minutes  Time In: 1258  Time Out: 1324  Minutes: 26  Timed Code Treatment Minutes: 26 Minutes       Date: 2024  Patient Name: Sybil Paula      CSN: [id number]   : 1931  (89 y.o.)  Gender: female   Referring Physician:   BRENDA Rendon  Diagnosis: Intractable Pain   Precautions: Fall Risk    Current Diet: Regular diet with thin liquids   Respiratory Status: Room Air  Swallowing Strategies: Standard Universal Swallow Precautions  Date of Last MBS/FEES: Not Applicable    Pain:  No pain reported.    Subjective: CAITIE Verdugo approved ST session, patient pleasant.     Short-Term Goals:  SHORT TERM GOAL #1:  Goal 1: Patient will complete recall, short term memory and working memory tasks with 75% accuracy provided mod cues to improve overall recall of daily and medical information  INTERVENTIONS:  Orientation: 100% with use of cell phone + min cues   Compensatory memory strategies instructed; WRAP- write it down, repeat, associate, picture it     Functional recall x4 item grocery list; 4/4 items recalled independent     Functional recall: x4 new units of information related to ST; 4/4 recalled provided FO2 choices    Working memory:   Trial 1: Repetition x3 single digit numbers: 5/5 independent  Trial 2: Patient presented with x3 single digit numbers via auditory presentation, cued to repeat in reverse orde3: 3/10 independent, 7/10 mod-max cues (written aids utilized)       Patient utilizing notepad as daily journal to improve carryover/recall    SHORT TERM GOAL #2:  Goal 2: Patient will complete executive functioning tasks with 75% accuracy provided mod cues to improve overall return to independent living (Ex. driving, cooking, finances, medications).  INTERVENTIONS:   Task 1: Writing driving instructions from home to hair appointment: x7 driving instructions written/verbalized independently, min 
Unable to fully complete med rec due to patient and family members unsure of medications...Daughter stated she would bring all the medications in tomorrow.   
[facility]  OCCUPATIONAL THERAPY MISSED TREATMENT NOTE  [facility] ORTHOPEDICS 7K  7K-02/002-A      Date: 2024  Patient Name: Sybil Paula        CSN: [id number]   : 1931  (89 y.o.)  Gender: female                REASON FOR MISSED TREATMENT: Patient Eating and Hold Treatment per Nursing. RN requested hold on 1st attempt to see around 8am as pt had been awake all night and had just fallen asleep. Second attempt to see at 0931 pt eating and requested OT come back later if time allowed.               
acetaminophen **OR** acetaminophen, melatonin, docusate sodium      Intake/Output Summary (Last 24 hours) at 6/9/2024 1158  Last data filed at 6/9/2024 0852  Gross per 24 hour   Intake 940 ml   Output --   Net 940 ml         Diet:  ADULT DIET; Regular    Exam:  /63   Pulse 79   Temp 98.3 °F (36.8 °C) (Oral)   Resp 16   Ht 1.549 m (5' 1\")   Wt 54.3 kg (119 lb 11.4 oz)   SpO2 99%   BMI 22.62 kg/m²     Physical Exam  Constitutional:       Interventions: She is not intubated.  Cardiovascular:      Rate and Rhythm: Normal rate and regular rhythm.   Pulmonary:      Effort: She is not intubated.   Neurological:      Mental Status: She is alert.   Psychiatric:         Speech: She is communicative.        Labs:   No results for input(s): \"WBC\", \"HGB\", \"HCT\", \"PLT\" in the last 72 hours.  No results for input(s): \"NA\", \"K\", \"CL\", \"CO2\", \"BUN\", \"CREATININE\", \"CALCIUM\", \"PHOS\" in the last 72 hours.    Invalid input(s): \"MAGNES\"  No results for input(s): \"AST\", \"ALT\", \"BILIDIR\", \"BILITOT\", \"ALKPHOS\" in the last 72 hours.  No results for input(s): \"INR\" in the last 72 hours.  No results for input(s): \"CKTOTAL\", \"TROPONINI\" in the last 72 hours.    Urinalysis:      Lab Results   Component Value Date/Time    NITRU NEGATIVE 06/03/2024 12:30 AM    WBCUA 0-2 11/24/2020 03:04 PM    BACTERIA NONE SEEN 11/24/2020 03:04 PM    RBCUA 0-2 11/24/2020 03:04 PM    BLOODU NEGATIVE 06/03/2024 12:30 AM    GLUCOSEU NEGATIVE 06/03/2024 12:30 AM       Radiology:  MRI LUMBAR SPINE W WO CONTRAST   Final Result      1. Acute fractures of the S2 sacral segment and the sacral ala bilaterally   consistent with sacral insufficiency fractures.   2. No fractures in the lumbar spine.   3. Degenerative changes throughout the lumbar spine with significant spinal   canal and foraminal stenosis at multiple levels as described.   4. Distended urinary bladder.               **This report has been created using voice recognition software. It may contain 
bars in shower  Bathroom Accessibility: Walker accessible    Receives Help From: Family  ADL Assistance: Independent  Homemaking Assistance: Independent  Homemaking Responsibilities: Yes  Ambulation Assistance: Independent  Transfer Assistance: Independent    Active : Yes  Occupation: Retired  Additional Comments: Patient states she was IND PTA without use of an AD. Pt reports family can assist, but unsure how much they can assist. Pt vague in these responses, no family present to verify.    SUBJECTIVE: Pt seated in bedside chair upon arrival, agreeable to OT session requesting up to BR.    PAIN: Did not rate \"A little\": Back    Vitals: Vitals not assessed per clinical judgement, see nursing flowsheet    Lines/Tubes: NA     COGNITION: Slow Processing, Decreased Recall, Decreased Insight, Impaired Memory, Decreased Problem Solving, Impaired Attention, and Impulsive    ADL:   Grooming: Stand By Assistance.  Standing sink side washing hands  Toileting: Stand By Assistance.  For hygiene and clothing management for BM Attempt   Toilet Transfer: Contact Guard Assistance. RTS- cues for hand placement.    IADL:   Not Tested    BALANCE:  Sitting Balance:  Supervision.   Standing Balance: Stand By Assistance.     BED MOBILITY:  Not Tested    TRANSFERS:  Sit to Stand:  Stand By Assistance.   Stand to Sit: Stand By Assistance.   Comment: To/from bedside chair- mod cues for hand placement    FUNCTIONAL MOBILITY:  Assistive Device: Rolling Walker   Assist Level: Contact Guard Assistance.   Distance: Completed functional mobility to/from BR and within pt room at fair pace, no LOB noted. Pt requires min cues for walker safety- seated rest break after trial of mobility, min fatigue noted.    ADDITIONAL ACTIVITIES:  Patient identified a personal goal to increase UB strength and improve overall endurance so they can complete their toilet & shower transfers; skilled edu on UE strengthening. Completed BUE AROM exercises x10 reps 
glycol, acetaminophen **OR** acetaminophen, melatonin, docusate sodium      Intake/Output Summary (Last 24 hours) at 6/8/2024 0855  Last data filed at 6/8/2024 0349  Gross per 24 hour   Intake 730 ml   Output 2 ml   Net 728 ml         Diet:  ADULT DIET; Regular    Exam:  BP (!) 153/64   Pulse 78   Temp 98 °F (36.7 °C) (Oral)   Resp 16   Ht 1.549 m (5' 1\")   Wt 58 kg (127 lb 13.9 oz)   SpO2 96%   BMI 24.16 kg/m²     Physical Exam  Constitutional:       Interventions: She is not intubated.  Cardiovascular:      Rate and Rhythm: Normal rate and regular rhythm.   Pulmonary:      Effort: She is not intubated.   Neurological:      Mental Status: She is alert.   Psychiatric:         Speech: She is communicative.        Labs:   No results for input(s): \"WBC\", \"HGB\", \"HCT\", \"PLT\" in the last 72 hours.  No results for input(s): \"NA\", \"K\", \"CL\", \"CO2\", \"BUN\", \"CREATININE\", \"CALCIUM\", \"PHOS\" in the last 72 hours.    Invalid input(s): \"MAGNES\"  No results for input(s): \"AST\", \"ALT\", \"BILIDIR\", \"BILITOT\", \"ALKPHOS\" in the last 72 hours.  No results for input(s): \"INR\" in the last 72 hours.  No results for input(s): \"CKTOTAL\", \"TROPONINI\" in the last 72 hours.    Urinalysis:      Lab Results   Component Value Date/Time    NITRU NEGATIVE 06/03/2024 12:30 AM    WBCUA 0-2 11/24/2020 03:04 PM    BACTERIA NONE SEEN 11/24/2020 03:04 PM    RBCUA 0-2 11/24/2020 03:04 PM    BLOODU NEGATIVE 06/03/2024 12:30 AM    GLUCOSEU NEGATIVE 06/03/2024 12:30 AM       Radiology:  MRI LUMBAR SPINE W WO CONTRAST   Final Result      1. Acute fractures of the S2 sacral segment and the sacral ala bilaterally   consistent with sacral insufficiency fractures.   2. No fractures in the lumbar spine.   3. Degenerative changes throughout the lumbar spine with significant spinal   canal and foraminal stenosis at multiple levels as described.   4. Distended urinary bladder.               **This report has been created using voice recognition software. It may 
  Stairs:  None    Balance:  Static Sitting Balance:  Stand By Assistance  Static Standing Balance: Contact Guard Assistance  Dynamic Standing Balance: Contact Guard Assistance  *pt required use of restroom during session with CGA for sit to stand and SBA for completion of pericare   Neuromuscular Re-education  None    Exercise:  Patient was guided in 1 set(s) 10 reps of exercises: Glut sets, Seated marches, Seated hamstring curls, Seated heel/toe raises, Long arc quads, Exercises were completed for increased independence with functional mobility.    Functional Outcome Measures:   [facility] (6 CLICK) BASIC MOBILITY     AM-PAC Inpatient Mobility without Stair Climbing Raw Score : 15  AM-PAC Inpatient without Stair Climbing T-Scale Score : 43.03  Mobility Inpatient CMS 0-100% Score: 47.43  Mobility Inpatient without Stair CMS G-Code Modifier : CK  AM-PAC Inpatient Mobility without Stair Climbing Raw Score : 15  AM-PAC Inpatient without Stair Climbing T-Scale Score : 43.03  Modified Harry Scale:  Not Applicable     ASSESSMENT:  Assessment: Patient progressing toward established goals.  Activity Tolerance:  Patient tolerance of  treatment: fair.     Equipment Recommendations:Equipment Needed: No  Discharge Recommendations:  Subacte/Skilled Nursing Facility    PLAN: Current Treatment Recommendations: Strengthening, Balance training, Functional mobility training, Transfer training, Gait training, Safety education & training, Patient/Caregiver education & training, Therapeutic activities  General Plan:  (4-5xO)    Patient Education  Patient Education: Plan of Care, Functional Mobility, Reviewed Prior Education, Health Promotion and Wellness Education, Safety, Verbal Exercise Instruction, Bed Mobility, Transfers, Gait    Goals:  Patient Goals : Pt goal to get better  Short Term Goals  Time Frame for Short Term Goals: By discharge  Short Term Goal 1: Supine to/from sit at S in order to get in/out of bed.  Short Term Goal 2: Sit 
Health    EDUCATION:  Learner: Patient  Education:  Reviewed results and recommendations of this evaluation, Reviewed ST goals and Plan of Care, Reviewed recommendations for follow-up, and Education Related to Potential Risks and Complications Due to Impairment/Illness/Injury  Evaluation of Education: Verbalizes understanding, Demonstrates with assistance, and Family not present    PLAN:  Skilled SLP intervention on acute care 3-5 x per week or until goals met and/or patient plateaus in function.  Specific interventions for next session may include: cognitive treatment .    PATIENT GOAL:    Did not state.  Will further assess during treatment.    SHORT TERM GOALS:   Short Term Goals  Time Frame for Short Term Goals: 2 weeks  Goal 1: Patient will complete recall, short term memory and working memory tasks with 75% accuracy provided mod cues to improve overall recall of daily and medical information  Goal 2: Patient will complete executive functioning tasks with 75% accuracy provided mod cues to improve overall return to independent living (Ex. driving, cooking, finances, medications).      LONG TERM GOALS:  No LTGs due to short ELOS     Felipa Da Silva M.S. CCC-SLP [id number]          
independence with functional mobility.    Functional Outcome Measures: Completed  AM-PAC Inpatient Mobility without Stair Climbing Raw Score : 15  AM-PAC Inpatient without Stair Climbing T-Scale Score : 43.03  Modified Austell Scale:  Not Applicable    ASSESSMENT:  Assessment: Patient progressing toward established goals.  Activity Tolerance:  Patient tolerance of  treatment: good. Natalya has good tolerance with activities. She has moderate/max pain in Rt hip during ambulation. She needs cues to stay on task as she perseverates on her discharge planning and pain. Unable to tolerates x15 reps with  RLE for SLR, completes x7 reps. She is limited by pain. Currently not ambulating far enough and tolerating upright posture to be safe at home. She would benefit from continued PT upon discharge to address her deficits with functional mobility. Recommend SNF placement upon discharge.   Equipment Recommendations:Equipment Needed: No  Discharge Recommendations: Continue to assess pending progress, Subacte/Skilled Nursing Facility, and Patient would benefit from continued PT at discharge  Plan: Current Treatment Recommendations: Strengthening, Balance training, Functional mobility training, Transfer training, Gait training, Safety education & training, Patient/Caregiver education & training, Therapeutic activities  General Plan:  (4-5xO)    Education  Education:  Learners: Patient  Patient Education: Plan of Care, Precautions/Restrictions, Education Related to Diagnosis, Bed Mobility, Transfers, Gait, Use of Gait Belt, Verbal Exercise Instruction, Activity Pacing,  - Patient Verbalized Understanding    Goals:  Patient Goals : Pt goal to get better  Short Term Goals  Time Frame for Short Term Goals: By discharge  Short Term Goal 1: Supine to/from sit at S in order to get in/out of bed.  Short Term Goal 2: Sit to/from stand at SBA in order to get up to walk.  Short Term Goal 3: Ambulate 50 feet with RW at SBA in order to walk in 
      Radiology:  MRI LUMBAR SPINE W WO CONTRAST   Final Result      1. Acute fractures of the S2 sacral segment and the sacral ala bilaterally   consistent with sacral insufficiency fractures.   2. No fractures in the lumbar spine.   3. Degenerative changes throughout the lumbar spine with significant spinal   canal and foraminal stenosis at multiple levels as described.   4. Distended urinary bladder.               **This report has been created using voice recognition software. It may contain   minor errors which are inherent in voice recognition technology.**            MRI SACRUM COCCYX W WO CONTRAST   Final Result   1.  Abnormal bone marrow signal is seen within the sacrum with fracture lines   present reflecting sacral insufficiency fractures.                    **This report has been created using voice recognition software. It may contain   minor errors which are inherent in voice recognition technology.**            CT LUMBAR SPINE WO CONTRAST   Final Result   Impression:   Bilateral sacral ala fractures.   Buckle fracture of the anterior cortex of S2, age indeterminate, but could    certainly have occurred due to trauma that created the sacral fractures.      This document has been electronically signed by: Natanael Rogers MD on    06/02/2024 09:06 PM      All CTs at this facility use dose modulation techniques and iterative    reconstructions, and/or weight-based dosing   when appropriate to reduce radiation to a low as reasonably achievable.      CT PELVIS WO CONTRAST Additional Contrast? None   Final Result   Impression:   Bilateral sacral alar fractures compatible with trauma noted in the    clinical history.      This document has been electronically signed by: Natanael Rogers MD on    06/02/2024 09:03 PM      All CTs at this facility use dose modulation techniques and iterative    reconstructions, and/or weight-based dosing   when appropriate to reduce radiation to a low as reasonably achievable.    
home at [facility]  Short Term Goals  Time Frame for Short Term Goals: by discharge  Short Term Goal 1: patient will tolerate 5 min functional standing with (S) to increase ease with toileting and grooming.  Short Term Goal 2: patient will functionally ambulate house hold distances with (S).  Short Term Goal 3: patient will complete ADL routine with (S) and edu in energy conservation tech to increase ADL success with 0-1 cues to initiate, sequence and stay on task.    AM-PAC Inpatient Daily Activity Raw Score: 19  AM-PAC Inpatient ADL T-Scale Score : 40.22    Following session, patient left in safe position with all fall risk precautions in place.

## 2024-06-10 NOTE — DISCHARGE SUMMARY
Hospital Medicine Discharge Summary      Patient Identification:   Sybil Paula   : 1931  MRN: [id number]   Account: [id number]      Patient's PCP: Kurt Olsen MD    Admit Date: 2024     Discharge Date: 6/10/24    Admitting Physician: No admitting provider for patient encounter.     Discharge Physician: Zaire Kimble PA-C     Sybil Paula is a 89 y.o. female admitted to [facility] on 2024.      HPI On Admission From H&P:    ***    Assessment/Plan With Discharge Diagnoses:    Acute S2 fractures: Spine consulted; treat conservatively. MRI showed sacral insufficiency fractures. Pain control. PT & OT today. Back Brace.   Intractable pain: Patient has received better pain control with Norco this has been continued we will monitor response. Pt was given Tramadol as an OP, did not control pain.  Constipation: Resolved. BM today.  MiraLAX, Senokot, Colace have been ordered.  Anemia, chronic disease: Hgb stable ~ 10.1.   Nonobstructive CAD: LHC  ASA,Lopressor continued.   TAVR  history  HFpEF: History ECHO  LVEF 60-65%, grade 1 diastolic dysfunction, trace mitral regurgitation  Essential hypertension: History Norvasc and Lopressor have been continued parameters to hold  Dyslipidemia: History Lipitor was continued  Anemia, normocytic: History monitor and trend    Patient prescribed Norco, Colace, and Senna upon d/c.    Exam:     Vitals:  Vitals:    24 1519 24 2127 06/10/24 0422 06/10/24 1017   BP: (!) 130/59 135/61 (!) 161/71 (!) 138/59   Pulse: 72 (!) 124 83 77   Resp: 16 16 18 18   Temp: 98.2 °F (36.8 °C) 97.8 °F (36.6 °C) 97.8 °F (36.6 °C) 98.2 °F (36.8 °C)   TempSrc: Oral Oral Oral    SpO2: 99% 99% 95% 99%   Weight:       Height:         Weight: Weight - Scale: 54.3 kg (119 lb 11.4 oz)     24 hour intake/output:  Intake/Output Summary (Last 24 hours) at 6/10/2024 1210  Last data filed at 6/10/2024 0409  Gross per 24 hour   Intake 320 ml   Output

## 2024-06-10 NOTE — CARE COORDINATION
6/10/24, 11:07 AM EDT    DISCHARGE PLANNING EVALUATION    Precert is approved for [facility], facility can accept at discharge.

## 2024-06-12 PROBLEM — G93.1 HYPOXIC ENCEPHALOPATHY (HCC): Status: RESOLVED | Noted: 2020-12-24 | Resolved: 2024-06-12

## 2024-06-18 DIAGNOSIS — S32.10XD CLOSED FRACTURE OF SACRUM WITH ROUTINE HEALING, UNSPECIFIED PORTION OF SACRUM, SUBSEQUENT ENCOUNTER: Primary | ICD-10-CM

## 2024-06-18 RX ORDER — HYDROCODONE BITARTRATE AND ACETAMINOPHEN 5; 325 MG/1; MG/1
1 TABLET ORAL EVERY 4 HOURS PRN
Qty: 60 TABLET | Refills: 0 | Status: SHIPPED | OUTPATIENT
Start: 2024-06-18 | End: 2024-07-18

## 2024-06-18 ASSESSMENT — ENCOUNTER SYMPTOMS
VOMITING: 0
COUGH: 0
SORE THROAT: 0
WHEEZING: 0
BACK PAIN: 1
NAUSEA: 0
DIARRHEA: 0
RHINORRHEA: 0
EYE REDNESS: 0
SHORTNESS OF BREATH: 0

## 2024-06-18 NOTE — PROGRESS NOTES
Sybil Paula is a 89 y.o. female who presents today for her medical conditions/complaints as noted below.   Chief Complaint   Patient presents with    Admission to [facility] 6/10/24       HPI:     Sybil Paula is a 89 yo female who was seen today for her admission to [facility] (admitted 6/10/24).  She has a h/o CAD with h/o TAVR, CHF, HTN, HLD, and anemia.  She was admitted to [facility] from 6/2/24-6/10/24.  Hospital records, labs, and imaging were reviewed and are summarized below.  On 6/2 she was taken to the ER with tailbone pain making it difficult to ambulate (was using a walker which she previously hadn't done).  She had fallen 2 weeks prior and had an outpatient CT.  MRI revealed S2 and sacral becki fractures so ortho was consulted who ordered a back brace.  She is on Tylenol and Norco for pain control and is here for rehab.  She will follow up with [facility] on 7/1/24.  Today she was seen ambulating in the [place] with her walker.  No new falls.  Her bowels are moving normally.  She's hoping to return home in a few weeks.      Past Medical History:   Diagnosis Date    Hyperlipidemia     Hypertension     Osteoporosis     Pneumonia       Past Surgical History:   Procedure Laterality Date    ANOMALOUS VENOUS RETURN REPAIR      CARDIAC VALVE REPLACEMENT      CARPAL TUNNEL RELEASE Right     ELBOW SURGERY      2013 right elbow    TONSILLECTOMY         No family history on file.    Social History     Tobacco Use    Smoking status: Never     Passive exposure: Never    Smokeless tobacco: Never   Substance Use Topics    Alcohol use: No     Comment: rare      Allergies   Allergen Reactions    Tomato Hives       Health Maintenance   Topic Date Due    COVID-19 Vaccine (1) Never done    DTaP/Tdap/Td vaccine (1 - Tdap) Never done    Respiratory Syncytial Virus (RSV) Pregnant or age 60 yrs+ (1 - 1-dose 60+ series) Never done    Lipids  01/17/2014    Pneumococcal 65+ years Vaccine (2 of 2 - PPSV23 or PCV20)

## 2024-06-19 ENCOUNTER — OUTSIDE SERVICES (OUTPATIENT)
Dept: FAMILY MEDICINE CLINIC | Age: 89
End: 2024-06-19

## 2024-06-19 VITALS
BODY MASS INDEX: 23.28 KG/M2 | SYSTOLIC BLOOD PRESSURE: 138 MMHG | HEART RATE: 82 BPM | TEMPERATURE: 98 F | WEIGHT: 123.2 LBS | DIASTOLIC BLOOD PRESSURE: 71 MMHG | OXYGEN SATURATION: 96 % | RESPIRATION RATE: 18 BRPM

## 2024-06-19 DIAGNOSIS — M80.00XA AGE-RELATED OSTEOPOROSIS WITH CURRENT PATHOLOGICAL FRACTURE, INITIAL ENCOUNTER: ICD-10-CM

## 2024-06-19 DIAGNOSIS — S32.19XD: Primary | ICD-10-CM

## 2024-06-19 DIAGNOSIS — D64.9 NORMOCYTIC ANEMIA: ICD-10-CM

## 2024-06-19 DIAGNOSIS — E78.2 MIXED HYPERLIPIDEMIA: ICD-10-CM

## 2024-06-19 DIAGNOSIS — I10 PRIMARY HYPERTENSION: ICD-10-CM

## 2024-06-19 DIAGNOSIS — K59.01 SLOW TRANSIT CONSTIPATION: ICD-10-CM

## 2024-06-19 DIAGNOSIS — I50.32 CHRONIC HEART FAILURE WITH PRESERVED EJECTION FRACTION (HCC): ICD-10-CM

## 2024-06-19 DIAGNOSIS — K58.1 IRRITABLE BOWEL SYNDROME WITH CONSTIPATION: ICD-10-CM

## 2024-06-19 DIAGNOSIS — M15.3 POST-TRAUMATIC OSTEOARTHRITIS OF MULTIPLE JOINTS: ICD-10-CM

## 2024-06-22 ENCOUNTER — HOSPITAL ENCOUNTER (OUTPATIENT)
Dept: CT IMAGING | Age: 89
Discharge: HOME | End: 2024-06-22
Payer: MEDICARE

## 2024-06-22 DIAGNOSIS — M53.3 SACRAL PAIN: ICD-10-CM

## 2024-06-22 PROCEDURE — 72192 CT PELVIS W/O DYE: CPT

## 2024-08-18 ENCOUNTER — HOSPITAL ENCOUNTER (EMERGENCY)
Age: 89
Discharge: HOME | End: 2024-08-18
Payer: MEDICARE

## 2024-08-18 VITALS
WEIGHT: 125 LBS | TEMPERATURE: 98 F | DIASTOLIC BLOOD PRESSURE: 80 MMHG | RESPIRATION RATE: 16 BRPM | SYSTOLIC BLOOD PRESSURE: 144 MMHG | BODY MASS INDEX: 23.62 KG/M2 | OXYGEN SATURATION: 98 % | HEART RATE: 74 BPM

## 2024-08-18 DIAGNOSIS — L30.9 DERMATITIS: Primary | ICD-10-CM

## 2024-08-18 PROCEDURE — 99213 OFFICE O/P EST LOW 20 MIN: CPT

## 2024-08-18 RX ORDER — PREDNISONE 20 MG/1
40 TABLET ORAL DAILY
Qty: 10 TABLET | Refills: 0 | Status: SHIPPED | OUTPATIENT
Start: 2024-08-18 | End: 2024-08-23

## 2024-08-18 ASSESSMENT — PAIN - FUNCTIONAL ASSESSMENT: PAIN_FUNCTIONAL_ASSESSMENT: NONE - DENIES PAIN

## 2024-08-18 NOTE — ED PROVIDER NOTES
[facility] URGENT CARE  Urgent Care Encounter       CHIEF COMPLAINT       Chief Complaint   Patient presents with    Rash       Nurses Notes reviewed and I agree except as noted in the HPI.  HISTORY OF PRESENT ILLNESS   Sybil Paula is a 89 y.o. female who presents to urgent care with rash on back.  States symptoms have been ongoing for 2 weeks.  Has tried over the counter anti-itch spray.    The history is provided by the patient. No  was used.       REVIEW OF SYSTEMS     Review of Systems   Constitutional: Negative.    HENT: Negative.     Eyes: Negative.    Respiratory: Negative.     Cardiovascular: Negative.    Gastrointestinal: Negative.    Endocrine: Negative.    Genitourinary: Negative.    Musculoskeletal: Negative.    Skin:  Positive for rash (back).   Allergic/Immunologic: Negative.    Neurological: Negative.    Hematological: Negative.    Psychiatric/Behavioral: Negative.         PAST MEDICAL HISTORY         Diagnosis Date    Hyperlipidemia     Hypertension     Osteoporosis     Pneumonia        SURGICALHISTORY     Patient  has a past surgical history that includes Elbow surgery; Tonsillectomy; Carpal tunnel release (Right); Anomalous venous return repair; and Cardiac valve replacement.    CURRENT MEDICATIONS       Previous Medications    ACETAMINOPHEN (TYLENOL) 325 MG TABLET    Take 1 tablet by mouth every 6 hours as needed for Pain    AMLODIPINE (NORVASC) 5 MG TABLET    TAKE 1 TABLET DAILY    ASPIRIN 81 MG EC TABLET    Take 1 tablet by mouth daily    LORATADINE (CLARITIN) 10 MG TABLET    Take 10 mg by mouth daily.      LOVASTATIN (MEVACOR) 10 MG TABLET    Take 1 tablet by mouth nightly    METOPROLOL TARTRATE (LOPRESSOR) 25 MG TABLET    Take 1 tablet by mouth 2 times daily    MULTIPLE VITAMINS-MINERALS (MULTI VITAMIN/MINERALS) TABS    Take  by mouth.      NONFORMULARY    Eye drop for cataracts    VITAMIN D (ERGOCALCIFEROL) 85849 UNITS CAPS CAPSULE    Take 1 capsule by

## 2024-08-18 NOTE — ED TRIAGE NOTES
Sybil arrives to room with complaint of  itchy rash on back for past 2 weeks      Using a itch spray which is not clearing.

## 2024-08-18 NOTE — DISCHARGE INSTRUCTIONS
Medications as per prescribed.  Increase fluid intake.  Follow-up with family doctor in 5 days.  Can use over-the-counter Caladryl or calamine lotion to areas of itching.  Return for new or worsening symptoms.

## 2024-09-11 ENCOUNTER — HOSPITAL ENCOUNTER (OUTPATIENT)
Dept: PHYSICAL THERAPY | Age: 89
Setting detail: THERAPIES SERIES
Discharge: HOME | End: 2024-09-11
Payer: MEDICARE

## 2024-09-11 PROCEDURE — 97165 OT EVAL LOW COMPLEX 30 MIN: CPT
